# Patient Record
Sex: MALE | Race: WHITE | NOT HISPANIC OR LATINO | Employment: OTHER | URBAN - METROPOLITAN AREA
[De-identification: names, ages, dates, MRNs, and addresses within clinical notes are randomized per-mention and may not be internally consistent; named-entity substitution may affect disease eponyms.]

---

## 2017-08-16 ENCOUNTER — HISTORICAL (OUTPATIENT)
Dept: BARIATRICS | Facility: HOSPITAL | Age: 72
End: 2017-08-16

## 2017-09-12 ENCOUNTER — HISTORICAL (OUTPATIENT)
Dept: LAB | Facility: HOSPITAL | Age: 72
End: 2017-09-12

## 2017-09-12 ENCOUNTER — HISTORICAL (OUTPATIENT)
Dept: ADMINISTRATIVE | Facility: HOSPITAL | Age: 72
End: 2017-09-12

## 2017-09-12 LAB
ABS NEUT (OLG): 4.6 X10(3)/MCL (ref 2.1–9.2)
ALBUMIN SERPL-MCNC: 4.1 GM/DL (ref 3.4–5)
ALBUMIN/GLOB SERPL: 1.5 {RATIO}
ALP SERPL-CCNC: 67 UNIT/L (ref 50–136)
ALT SERPL-CCNC: 26 UNIT/L (ref 12–78)
APPEARANCE, UA: CLEAR
APTT PPP: 32.2 SECOND(S) (ref 20.6–36)
AST SERPL-CCNC: 15 UNIT/L (ref 15–37)
BACTERIA SPEC CULT: ABNORMAL /HPF
BASOPHILS # BLD AUTO: 0 X10(3)/MCL (ref 0–0.2)
BASOPHILS NFR BLD AUTO: 1 %
BILIRUB SERPL-MCNC: 0.5 MG/DL (ref 0.2–1)
BILIRUB UR QL STRIP: ABNORMAL
BILIRUBIN DIRECT+TOT PNL SERPL-MCNC: 0.2 MG/DL (ref 0–0.2)
BILIRUBIN DIRECT+TOT PNL SERPL-MCNC: 0.3 MG/DL (ref 0–0.8)
BUN SERPL-MCNC: 22 MG/DL (ref 7–18)
CALCIUM SERPL-MCNC: 8.9 MG/DL (ref 8.5–10.1)
CHLORIDE SERPL-SCNC: 109 MMOL/L (ref 98–107)
CO2 SERPL-SCNC: 28 MMOL/L (ref 21–32)
COLOR UR: YELLOW
CREAT SERPL-MCNC: 0.98 MG/DL (ref 0.7–1.3)
EOSINOPHIL # BLD AUTO: 0.1 X10(3)/MCL (ref 0–0.9)
EOSINOPHIL NFR BLD AUTO: 2 %
ERYTHROCYTE [DISTWIDTH] IN BLOOD BY AUTOMATED COUNT: 13.1 % (ref 11.5–17)
GLOBULIN SER-MCNC: 2.8 GM/DL (ref 2.4–3.5)
GLUCOSE (UA): NEGATIVE
GLUCOSE SERPL-MCNC: 127 MG/DL (ref 74–106)
HCT VFR BLD AUTO: 42 % (ref 42–52)
HGB BLD-MCNC: 14.3 GM/DL (ref 14–18)
HGB UR QL STRIP: NEGATIVE
INR PPP: 1.14 (ref 0–1.27)
KETONES UR QL STRIP: ABNORMAL
LEUKOCYTE ESTERASE UR QL STRIP: NEGATIVE
LYMPHOCYTES # BLD AUTO: 1.1 X10(3)/MCL (ref 0.6–4.6)
LYMPHOCYTES NFR BLD AUTO: 17 %
MCH RBC QN AUTO: 29.5 PG (ref 27–31)
MCHC RBC AUTO-ENTMCNC: 34 GM/DL (ref 33–36)
MCV RBC AUTO: 86.8 FL (ref 80–94)
MONOCYTES # BLD AUTO: 0.6 X10(3)/MCL (ref 0.1–1.3)
MONOCYTES NFR BLD AUTO: 9 %
NEUTROPHILS # BLD AUTO: 4.6 X10(3)/MCL (ref 2.1–9.2)
NEUTROPHILS NFR BLD AUTO: 71 %
NITRITE UR QL STRIP: NEGATIVE
PH UR STRIP: 5.5 [PH] (ref 5–9)
PLATELET # BLD AUTO: 166 X10(3)/MCL (ref 130–400)
PMV BLD AUTO: 9.8 FL (ref 9.4–12.4)
POTASSIUM SERPL-SCNC: 3.9 MMOL/L (ref 3.5–5.1)
PROT SERPL-MCNC: 6.9 GM/DL (ref 6.4–8.2)
PROT UR QL STRIP: NEGATIVE
PROTHROMBIN TIME: 14.4 SECOND(S) (ref 12.1–14.2)
RBC # BLD AUTO: 4.84 X10(6)/MCL (ref 4.7–6.1)
RBC #/AREA URNS HPF: ABNORMAL /[HPF]
SODIUM SERPL-SCNC: 145 MMOL/L (ref 136–145)
SP GR UR STRIP: 1.03 (ref 1–1.03)
SQUAMOUS EPITHELIAL, UA: ABNORMAL
TRANSFERRIN SERPL-MCNC: 268 MG/DL (ref 200–360)
UROBILINOGEN UR STRIP-ACNC: 1
WBC # SPEC AUTO: 6.4 X10(3)/MCL (ref 4.5–11.5)
WBC #/AREA URNS HPF: ABNORMAL /HPF

## 2017-09-14 LAB — FINAL CULTURE: NORMAL

## 2017-10-11 ENCOUNTER — HISTORICAL (OUTPATIENT)
Dept: ADMINISTRATIVE | Facility: HOSPITAL | Age: 72
End: 2017-10-11

## 2017-11-07 ENCOUNTER — HISTORICAL (OUTPATIENT)
Dept: LAB | Facility: HOSPITAL | Age: 72
End: 2017-11-07

## 2017-11-07 LAB — PSA SERPL-MCNC: 2.18 NG/ML (ref 0–4)

## 2018-02-27 ENCOUNTER — HISTORICAL (OUTPATIENT)
Dept: ADMINISTRATIVE | Facility: HOSPITAL | Age: 73
End: 2018-02-27

## 2020-11-03 ENCOUNTER — HISTORICAL (OUTPATIENT)
Dept: BARIATRICS | Facility: HOSPITAL | Age: 75
End: 2020-11-03

## 2022-01-12 ENCOUNTER — HISTORICAL (OUTPATIENT)
Dept: ADMINISTRATIVE | Facility: HOSPITAL | Age: 77
End: 2022-01-12

## 2022-01-12 LAB
ALBUMIN SERPL-MCNC: 3.9 GM/DL (ref 3.4–4.8)
ALBUMIN/GLOB SERPL: 1.3 RATIO (ref 1.1–2)
ALP SERPL-CCNC: 59 UNIT/L (ref 40–150)
ALT SERPL-CCNC: 13 UNIT/L (ref 0–55)
AST SERPL-CCNC: 16 UNIT/L (ref 5–34)
BILIRUB SERPL-MCNC: 0.8 MG/DL
BILIRUBIN DIRECT+TOT PNL SERPL-MCNC: 0.4 MG/DL (ref 0–0.5)
BILIRUBIN DIRECT+TOT PNL SERPL-MCNC: 0.4 MG/DL (ref 0–0.8)
BUN SERPL-MCNC: 26.8 MG/DL (ref 8.4–25.7)
CALCIUM SERPL-MCNC: 9.4 MG/DL (ref 8.7–10.5)
CHLORIDE SERPL-SCNC: 107 MMOL/L (ref 98–107)
CO2 SERPL-SCNC: 26 MMOL/L (ref 23–31)
CREAT SERPL-MCNC: 1.12 MG/DL (ref 0.73–1.18)
CREAT UR-MCNC: 82.1 MG/DL (ref 58–161)
EST. AVERAGE GLUCOSE BLD GHB EST-MCNC: 114 MG/DL
GLOBULIN SER-MCNC: 3 GM/DL (ref 2.4–3.5)
GLUCOSE SERPL-MCNC: 106 MG/DL (ref 82–115)
HBA1C MFR BLD: 5.6 %
MICROALBUMIN UR-MCNC: <5 UG/ML
MICROALBUMIN/CREAT RATIO PNL UR: <6.1 MG/GM CR (ref 0–30)
POTASSIUM SERPL-SCNC: 4 MMOL/L (ref 3.5–5.1)
PROT SERPL-MCNC: 6.9 GM/DL (ref 5.8–7.6)
SODIUM SERPL-SCNC: 142 MMOL/L (ref 136–145)

## 2022-01-13 ENCOUNTER — HISTORICAL (OUTPATIENT)
Dept: ADMINISTRATIVE | Facility: HOSPITAL | Age: 77
End: 2022-01-13

## 2022-03-24 ENCOUNTER — HISTORICAL (OUTPATIENT)
Dept: ADMINISTRATIVE | Facility: HOSPITAL | Age: 77
End: 2022-03-24

## 2022-03-24 LAB — URATE SERPL-MCNC: 8.2 MG/DL (ref 3.5–7.2)

## 2022-04-10 ENCOUNTER — HISTORICAL (OUTPATIENT)
Dept: ADMINISTRATIVE | Facility: HOSPITAL | Age: 77
End: 2022-04-10
Payer: MEDICARE

## 2022-04-26 VITALS
SYSTOLIC BLOOD PRESSURE: 118 MMHG | WEIGHT: 288.81 LBS | OXYGEN SATURATION: 96 % | HEIGHT: 70 IN | DIASTOLIC BLOOD PRESSURE: 60 MMHG | BODY MASS INDEX: 41.35 KG/M2

## 2022-05-03 NOTE — HISTORICAL [facility]
This is a historical note converted from El. Formatting and pictures may have been removed.  Please reference El for original formatting and attached multimedia. Chief Complaint  POST OP RIGHT TSR DONE ON 09/25/17. STILL FEELS A LITTLE SORE  History of Present Illness  Patient here today for follow-up from right TSR  Having less pain  No wound drainage  Tolerating PT well  Still with some stiffness and muscle weakness  Review of Systems  Constitutional: No fever, No chills.  Respiratory: No shortness of breath, No cough.  Cardiovascular: No chest pain.  Gastrointestinal: No nausea, No vomiting, No diarrhea, No constipation, No heartburn.  Genitourinary: No dysuria, No hematuria.  Hematology/Lymphatics: No bleeding tendency.  Endocrine: No polyuria.  Neurologic: Alert and oriented X4, No numbness, No tingling.  Psychiatric: No anxiety, No depression.  Physical Exam  Vitals & Measurements  BP:?143/55?  HT:?177.8?cm? HT:?177.8?cm? WT:?120.6?kg? WT:?120.6?kg? BMI:?38.15?  right?shoulder exam  Wound healing well without s/s infection  Decreased ROM and muscle weakness which is improving with physical therapy  CMS intact to the?right upper extremity  ???  ???  x-rays show intact?prosthesis  ???  staples removed in office today  Wound care instructions discussed with patient  ???  Plan:  Continue with therapy and home exercises  Follow-up in one month  Assessment/Plan  1.?Osteoarthritis of right shoulder  Ordered:  Clinic Follow up, *Est. 11/11/17 3:00:00 CST, Order for future visit, Osteoarthritis of right shoulder  Aftercare following joint replacement, [facility]  Post-Op follow-up visit [id number] PC, Osteoarthritis of right shoulder  Aftercare following joint replacement, [facility], 10/11/17 10:00:00 CDT  ?  2.?Aftercare following joint replacement  Ordered:  Clinic Follow up, *Est. 11/11/17 3:00:00 CST, Order for future visit, Osteoarthritis of right shoulder  Aftercare following joint  replacement, [facility]  Post-Op follow-up visit [id number] PC, Osteoarthritis of right shoulder  Aftercare following joint replacement, [facility], 10/11/17 10:00:00 CDT  ?   Problem List/Past Medical History  Ongoing  Able to lie down  Aftercare following joint replacement  Anxiety  Arthritis  CPAP (continuous positive airway pressure) ventilation weaning protocol  Depression  Exercise tolerance finding  Hyperlipidemia  Hypertension  Hypertension  Obesity  Osteoarthritis of right shoulder  Reflux  Reflux  Sleep apnea  Steroid  Varicose vein  Historical  Asthma  Colon polyp  Enlarged prostate  Gout  Kidney stone  Nasal bleeding  Wears glasses  Procedure/Surgical History  Replacement of Right Shoulder Joint with Synthetic Substitute, Open Approach (09/25/2017)  Total Shoulder Arthroplasty (Right) (09/25/2017)  Gastric Sleeve Laparoscopic (.) (08/24/2015)  Laparoscopic Vertical (Sleeve) Gastrectomy (08/24/2015)  Esophagogastroduodenoscopy (08/13/2015)  Esophagogastroduodenoscopy [EGD] with closed biopsy (08/13/2015)  Esophagogastroduodenoscopy, flexible, transoral; with biopsy, single or multiple (08/13/2015)  Appendectomy  Bilateral kidney stones  History of tonsillectomy  Medications  allopurinol 300 mg oral tablet, 300 mg= 1 tab(s), Oral, Daily  amlodipine-benazepril 5 mg-20 mg oral capsule, 1 cap(s), Oral, BID  atorvastatin 40 mg oral tablet, 40 mg= 1 tab(s), Oral, Daily  calcium carbonate 600 mg oral tablet, 600 mg= 1 tab(s), Oral, Daily  carvedilol 12.5 mg oral tablet, 12.5 mg= 1 tab(s), Oral, BID  Colace 100 mg oral capsule, 200 mg= 2 cap(s), Oral, Daily  Multi-Day Plus Minerals oral tablet, 1 tab(s), Oral, Daily  Percocet 5/325 oral tablet, 1-2 tab(s), Oral, q4hr, PRN  sertraline 100 mg oral tablet, 100 mg= 1 tab(s), Oral, Daily  Super B Complex oral tablet, 1 tab(s), Oral, Daily  tamsulosin 0.4 mg oral capsule, 0.4 mg= 1 cap(s), Oral, Daily  Valium 5 mg oral tablet, 5 mg= 1 tab(s), Oral,  q6hr, PRN  Allergies  No Known Medication Allergies  Social History  Alcohol - Low Risk, 09/12/2017  Current, Liquor, 1-2 times per month  Employment/School - 09/12/2017  Highest education level: High school.  Home/Environment - 09/12/2017  Lives with Spouse. Living situation: Home/Independent. Family/Friends available for support: Yes.  Nutrition/Health - 09/12/2017  Regular  Substance Abuse - Denies Substance Abuse, 09/12/2017  Tobacco - Denies Tobacco Use, 09/12/2017  DIP  Former smoker, Stopped age 68 Years.  Family History  Primary malignant neoplasm of brain: Mother.

## 2022-05-03 NOTE — HISTORICAL [facility]
This is a historical note converted from El. Formatting and pictures may have been removed.  Please reference El for original formatting and attached multimedia. Chief Complaint  PATIENT HERE FOR RIGHT SHOULDER PAIN. PATIENT STATES HE STARTED AT THE GYM IN 01/2018 WHEN THE SHOULDER STARTED HURTING. STATES IT IS STIFF AND SWOLLEN  History of Present Illness  Pain?started after therapy ?? Pain right Shoulder laterally ?? No fever ?? No chills  ?? Pain in the right shoulder in the subacromial region ?? In the supraspinatus region ?? When actively and passively moved ?? ?? Started gradually ?? Slowly worsens with extended activity ?? Worsens at night ?? Causing an inability to sleep ?? Causes difficulty lying on affected side ?? Feels better after rest ?? Not relieved by medication ?? Shoulder joint stiffness on the right ?? joint stiffness ???? No radicular arm pain ?? No right sternoclavicular joint pain ?? No right shoulder joint swelling ?? No pain in the acromioclavicular  ? ?? No tingling of the right shoulder ?? No right shoulder numbness  ?? Range of motion was abnormal difficulty reaching over head using right arm ?? Range of motion was abnormal difficulty combing hair using right arm ?? Range of motion was abnormal difficulty taking shirt on/off using right arm  ?? No neck pain on right Percervical pain ?? Not in the trapezius Trapezius pain  Pain is significantly effecting quality of life  Review of Systems  Review of Systems  ????Constitutional: No fever, No chills.  ????Respiratory: No shortness of breath, No cough.  ????Cardiovascular: No chest pain.  ????Gastrointestinal: No nausea, No vomiting, No diarrhea, No constipation, No heartburn.  ????Genitourinary: No dysuria, No hematuria.  ????Hematology/Lymphatics: No bleeding tendency.  ????Endocrine: No polyuria.  ????Neurologic: Alert and oriented X4, No numbness, No tingling.  ????Psychiatric: No anxiety, No depression.  Physical Exam  Vitals &  Measurements  BP:?150/90?  HT:?177?cm? HT:?177?cm? WT:?120.6?kg? WT:?120.6?kg? BMI:?38.49?  ??????  ???  PHYSICAL FINDING  ??  Neck:  ??NOTED ?Pain radiating to the shoulder Percervical  ???  Musculoskeletal:  ? NO swelling of the right acromioclavicular joint.  ? NO swelling of the left acromioclavicular joint.  ???  General Appearance:  ? In NO acute distress.  ???  Eyes:  General/bilateral:  Sclera: ? Normal.  ???  Ears:  General/bilateral:  Outer Ear: ? Normal.  ???  Lungs:  ? Respiratory excursion normal and symmetric.  ???  Cardiovascular:  Heart Rate and Rhythm: ? Normal.  Arterial Pulses: ? Ulnar pulses 1+ right. ? Radial pulse 1+ right. ? Radial pulse 1+ left.  ???  Abdomen:  ? Normal.  ???  ???  Musculoskeletal System:  ???  Shoulder:  ?.  ?? atrophy of the deltoid muscle  ? NO atrophy of the supraspinatus muscle  ? NO atrophy of the infraspinatus muscle  ? NO pain was elicited during a lift-off test of the shoulder?  ???  Right Shoulder: ?. ? Acromial tenderness on palpation. ? Tenderness on palpation of the subacromial bursa. ? Tenderness on palpation of the deltoid muscle. ? Tenderness on palpation of the supraspinatus muscle. ? Tenderness on palpation of the infraspinatus muscle. ? Tenderness on palpation of the glenohumeral joint region. ? Tenderness on palpation at the bicipital groove. ? Tenderness on palpation of the trapezius muscle. ? Subacromial crepitus on motion was noted.  ???  .  ???  Right Shoulder:  ???  Shoulder Motion:??????????????Value???? ????Normal Range  ???  Active abduction??????????????? 80 degrees  Active forward flexion????????????????160 degrees  Active external rotation?????????????? 40 degrees  Active external rotation?????????????? 30 degrees  ???  ? NO swelling medial sternoclavicular.  ? NO swelling.  ? NO induration.  ? NO erythema.  ?? long head biceps deformity.  ? NO winged scapula.  ?  ? NO pain was elicited during a Neer impingement test.  ? NO pain was elicited  during a Boston-Anmol impingement test.  ?   ?????????????????????????????????????????????????????????????????????????  Left Shoulder:  ???  ??????Normal Range  ???  ?   ???????????????????????????????????????????????????????????????????????????????  Clavicle:  ???  General/bilateral: ? Acromioclavicular joint showed NO pain elicited by motion distal right.  ???  Right Clavicle: ? Right sternoclavicular joint showed tenderness on palpation. ? Right acromioclavicular joint showed tenderness on palpation.  ???  Left Clavicle: ? Left sternoclavicular joint showed tenderness on palpation. ? Left acromioclavicular joint showed tenderness on palpation.  ???  ???  Neurological:  ???  ? NO abnormalities were noted Sensibility intact distally on right.  ? Oriented to time, place, and person.  ???  Sensation:  ? NO sensory exam abnormalities were noted Decreased median sensation on right.  ? NO sensory exam abnormalities were noted Decreased ulnar sensation on right.  ? NO sensory exam abnormalities were noted Decreased radial sensation on right.  ???  Motor (Strength):  ? NO weakness of the right shoulder was observed.  ? NO weakness of the left shoulder was observed.  ??????????????????????????????????????????????????????????????????????????????  Skin:  ? NO ecchymosis on the shoulders left.  ? NO ecchymosis on the shoulders right.  ???  Injury / Incision Site: ? NO scar.  ???  TESTS  ???  Imaging:  ???  X-Ray Shoulder:?Complete, two or more views of the true AP of the glenohumeral joint were performed??????????????????????????????????????????????????????????????????????????????????  ?  ?   radiographic evidence of a glenohumeral joint replacement with good positioning of the humerus and glenoid component.  .  ???  :  ??????  ???  ASSESSMENT  biceps tendon rupture  ? Partial tear of rotator cuff tendon  ? Adhesive capsulitis of right shoulder  ???  ???  PLAN  ?   ? Physical therapy service  ? Home  exercises  Assessment/Plan  1.?Strain of muscle, fascia and tendon of long head of biceps, right arm, initial encounter  Right shoulder pain   Problem List/Past Medical History  Ongoing  Able to lie down  Aftercare following joint replacement  Anxiety  Arthritis  Biceps tendon tear  CPAP (continuous positive airway pressure) ventilation weaning protocol  Depression  Exercise tolerance finding  Hyperlipidemia  Hypertension  Hypertension  Obesity  Osteoarthritis of right shoulder  Reflux  Reflux  Sleep apnea  Steroid  Varicose vein  Historical  Asthma  Colon polyp  Enlarged prostate  Gout  Kidney stone  Nasal bleeding  Wears glasses  Procedure/Surgical History  Replacement of Right Shoulder Joint with Synthetic Substitute, Open Approach (09/25/2017), Total Shoulder Arthroplasty (Right) (09/25/2017), Gastric Sleeve Laparoscopic (.) (08/24/2015), Laparoscopic Vertical (Sleeve) Gastrectomy (08/24/2015), Esophagogastroduodenoscopy (08/13/2015), Esophagogastroduodenoscopy [EGD] with closed biopsy (08/13/2015), Esophagogastroduodenoscopy, flexible, transoral; with biopsy, single or multiple (08/13/2015), Appendectomy, Bilateral kidney stones, History of tonsillectomy.  Medications  allopurinol 300 mg oral tablet, 300 mg= 1 tab(s), Oral, Daily  amlodipine-benazepril 5 mg-20 mg oral capsule, 1 cap(s), Oral, BID  atorvastatin 40 mg oral tablet, 40 mg= 1 tab(s), Oral, Daily  calcium carbonate 600 mg oral tablet, 600 mg= 1 tab(s), Oral, Daily  carvedilol 12.5 mg oral tablet, 12.5 mg= 1 tab(s), Oral, BID  Colace 100 mg oral capsule, 200 mg= 2 cap(s), Oral, Daily,? ?Not taking  MECLIZINE 25 MG TABLET  Multi-Day Plus Minerals oral tablet, 1 tab(s), Oral, Daily  Percocet 5/325 oral tablet, 1-2 tab(s), Oral, q4hr, PRN,? ?Not taking  sertraline 100 mg oral tablet, 100 mg= 1 tab(s), Oral, Daily  Super B Complex oral tablet, 1 tab(s), Oral, Daily  tamsulosin 0.4 mg oral capsule, 0.4 mg= 1 cap(s), Oral, Daily  Valium 5 mg oral tablet, 5  mg= 1 tab(s), Oral, q6hr, PRN,? ?Not taking  Allergies  No Known Medication Allergies  Social History  Alcohol - Low Risk, 06/03/2015  Current, Liquor, 1-2 times per month, 09/12/2017  Employment/School  Highest education level: High school., 08/09/2015  Home/Environment  Lives with Spouse. Living situation: Home/Independent. Family/Friends available for support: Yes., 08/09/2015  Nutrition/Health  Regular, 08/09/2015  Substance Abuse - Denies Substance Abuse, 08/09/2015  Never, 02/27/2018  Tobacco - Denies Tobacco Use, 06/03/2015  DIP, 08/09/2015  Former smoker, Stopped age 68 Years., 06/03/2015  Family History  Primary malignant neoplasm of brain: Mother.  Immunizations  Vaccine Date Status   pneumococcal 23-polyvalent vaccine 08/25/2015 Given

## 2022-05-03 NOTE — HISTORICAL [facility]
This is a historical note converted from El. Formatting and pictures may have been removed.  Please reference El for original formatting and attached multimedia. Chief Complaint  PATIENT HER FOR RIGHT SHOULDER PAIN. DID XRAYS WITH PCP. WILL GET NEW ONES TODAY  History of Present Illness  Pain getting WORST?? Pain right Shoulder laterally ?? No fever ?? No chills  ?? Pain in the right shoulder in the subacromial region ?? In the supraspinatus region ?? When actively and passively moved ?? ?? Started gradually ?? Slowly worsens with extended activity ?? Worsens at night ?? Causing an inability to sleep ?? Causes difficulty lying on affected side ?? Feels better after rest ?? Not relieved by medication ?? Shoulder joint stiffness on the right ?? joint stiffness ???? No radicular arm pain ?? No right sternoclavicular joint pain ?? No right shoulder joint swelling ?? No pain in the acromioclavicular  ? ?? No tingling of the right shoulder ?? No right shoulder numbness  ?? Range of motion was abnormal difficulty reaching over head using right arm ?? Range of motion was abnormal difficulty combing hair using right arm ?? Range of motion was abnormal difficulty taking shirt on/off using right arm  ?? No neck pain on right Percervical pain ?? Not in the trapezius Trapezius pain  Pain is significantly effecting quality of life  Review of Systems  Review of Systems  ????Constitutional: No fever, No chills.  ????Respiratory: No shortness of breath, No cough.  ????Cardiovascular: No chest pain.  ????Gastrointestinal: No nausea, No vomiting, No diarrhea, No constipation, No heartburn.  ????Genitourinary: No dysuria, No hematuria.  ????Hematology/Lymphatics: No bleeding tendency.  ????Endocrine: No polyuria.  ????Neurologic: Alert and oriented X4, No numbness, No tingling.  ????Psychiatric: No anxiety, No depression.  Physical Exam  Vitals & Measurements  BP:?140/88?  HT:?175?cm? HT:?175?cm? WT:?113.39?kg? WT:?113.39?kg?  BMI:?37.03?  ??????  ???  PHYSICAL FINDING  ??  Neck:  ??NOTED ?Pain radiating to the shoulder Percervical  ???  Musculoskeletal:  ? NO swelling of the right acromioclavicular joint.  ? NO swelling of the left acromioclavicular joint.  ???  General Appearance:  ? In NO acute distress.  ???  Eyes:  General/bilateral:  Sclera: ? Normal.  ???  Ears:  General/bilateral:  Outer Ear: ? Normal.  ???  Lungs:  ? Respiratory excursion normal and symmetric.  ???  Cardiovascular:  Heart Rate and Rhythm: ? Normal.  Arterial Pulses: ? Ulnar pulses 1+ right. ? Radial pulse 1+ right. ? Radial pulse 1+ left.  ???  Abdomen:  ? Normal.  ???  ???  Musculoskeletal System:  ???  Shoulder:  ?.  ?? atrophy of the deltoid muscle  ?? atrophy of the supraspinatus muscle  ? NO atrophy of the infraspinatus muscle  ? NO pain was elicited during a lift-off test of the shoulder?  ???  Right Shoulder: ?. ? Acromial tenderness on palpation. ? Tenderness on palpation of the subacromial bursa. ? Tenderness on palpation of the deltoid muscle. ? Tenderness on palpation of the supraspinatus muscle. ? Tenderness on palpation of the infraspinatus muscle. ? Tenderness on palpation of the glenohumeral joint region. ? Tenderness on palpation at the bicipital groove. ? Tenderness on palpation of the trapezius muscle. ? Subacromial crepitus on motion was noted.  ???  ?   Right Shoulder:  ???  Shoulder Motion:??????????????Value???? ????Normal Range  ???  Active abduction????????????????45 degrees  Active forward flexion????????????????100 degrees  Active external rotation?????????????? 05 degrees  Active internal rotation?????????????? 20 degrees  ???  ? NO swelling medial sternoclavicular.  ? NO swelling.  ? NO induration.  ? NO erythema.  ? NO long head biceps deformity.  ? NO winged scapula.  ?   ? Motion was normal.  ??????????????????????????????????????????????????????????????  Left Shoulder:  ???  ??Normal  Range  ???  ???????????????  Clavicle:  ???  General/bilateral: ? Acromioclavicular joint showed NO pain elicited by motion distal right.  ???  Right Clavicle: ? Right sternoclavicular joint showed tenderness on palpation. ? Right acromioclavicular joint showed tenderness on palpation.  ???  Left Clavicle: ? Left sternoclavicular joint showed tenderness on palpation. ? Left acromioclavicular joint showed tenderness on palpation.  ???  ???  Neurological:  ???  ? NO abnormalities were noted Sensibility intact distally on right.  ? Oriented to time, place, and person.  ???  Sensation:  ? NO sensory exam abnormalities were noted Decreased median sensation on right.  ? NO sensory exam abnormalities were noted Decreased ulnar sensation on right.  ? NO sensory exam abnormalities were noted Decreased radial sensation on right.  ???  Motor (Strength):  ? weakness of the right shoulder was observed.  ? NO weakness of the left shoulder was observed.  ??????????????????????????????????????????????????????????????????????????????  Skin:  ? NO ecchymosis on the shoulders left.  ? NO ecchymosis on the shoulders right.  ???  Injury / Incision Site: ? NO scar.  ???  TESTS  ???  Imaging:  ???  X-Ray Shoulder:?Complete, two or more views of the true AP of the glenohumeral joint were performed??????????????????????????????????????????????????????????????????????????????????  ?  ?   radiographic evidence of any osteoarticular abnormality  This patients right shoulder x-rays show severe osteoarthritis with bone-on-bone of the humeral head and glenoid?with large osteophyte formation.????????????????????????????????????????????????????????????????????????????????????????????????????????????????????????????????????????????????????????????  .  ???  MRI Shoulder:  ??????  ???  ASSESSMENT  ? Localized primary osteoarthritis of the right shoulder glenohumeral joint  ?   ???  ???  PLAN  ?   ? RIGHT Shoulder arthroplasty  ?   ? Physical therapy  service  ? Home exercises  Assessment/Plan  Right shoulder pain  Ordered:  XR Shoulder Right Minimum 2 Views  ?   Problem List/Past Medical History  Ongoing  Able to lie down  Anxiety  Arthritis  CPAP (continuous positive airway pressure) ventilation weaning protocol  Depression  Exercise tolerance finding  Hyperlipidemia  Hypertension  Hypertension  Obesity  Reflux  Reflux  Sleep apnea  Steroid  Historical  Asthma  Colon polyp  Enlarged prostate  Gout  Kidney stone  Nasal bleeding  Wears glasses  Procedure/Surgical History  Gastric Sleeve Laparoscopic (.) (08/24/2015)  Laparoscopic Vertical (Sleeve) Gastrectomy (08/24/2015)  Esophagogastroduodenoscopy (08/13/2015)  Esophagogastroduodenoscopy [EGD] with closed biopsy (08/13/2015)  Esophagogastroduodenoscopy, flexible, transoral; with biopsy, single or multiple (08/13/2015)  Appendectomy  Bilateral kidney stones  History of tonsillectomy  Medications  allopurinol 300 mg oral tablet, 300 mg= 1 tab(s), Oral, Daily  amlodipine 10 mg oral tablet, 10 mg= 1 tab(s), Oral, Daily,? ?Not taking  AMLODIPINE-BENAZEPRIL 5-20 MG  ATORVASTATIN 40 MG TABLET  benazepril 40 mg oral tablet, 40 mg= 1 tab(s), Oral, Daily,? ?Not taking  CARVEDILOL 12.5 MG TABLET  Fish Oil, 1 tab(s), Oral, Daily,? ?Not taking  lysine 500 mg oral tablet, 1 tab, Oral, Daily  Multi-Day Plus Minerals oral tablet, 1 tab(s), Oral, Daily  sertraline 100 mg oral tablet, 100 mg= 1 tab(s), Oral, Daily  Super B Complex oral tablet, 1 tab(s), Oral, Daily  tamsulosin 0.4 mg oral capsule, 0.4 mg= 1 cap(s), Oral, Daily  Vitamin D3 1000 intl units oral tablet, 1000 IntUnit= 1 tab(s), Oral, Daily,? ?Not taking  Allergies  No Known Medication Allergies  Social History  Alcohol - Low Risk, 08/09/2015  Current, Beer, 1-2 times per week  Employment/School - 08/09/2015  Highest education level: High school.  Home/Environment - 08/09/2015  Lives with Spouse. Living situation: Home/Independent. Family/Friends available for  support: Yes.  Nutrition/Health - 08/09/2015  Regular  Substance Abuse - Denies Substance Abuse, 08/09/2015  Tobacco - Denies Tobacco Use, 08/09/2015  DIP  Former smoker, Stopped age 68 Years.  Family History  Primary malignant neoplasm of brain: Mother.

## 2022-05-31 ENCOUNTER — OFFICE VISIT (OUTPATIENT)
Dept: ORTHOPEDICS | Facility: CLINIC | Age: 77
End: 2022-05-31
Payer: MEDICARE

## 2022-05-31 VITALS — HEIGHT: 70 IN | BODY MASS INDEX: 41.23 KG/M2 | WEIGHT: 288 LBS

## 2022-05-31 DIAGNOSIS — M17.12 PRIMARY OSTEOARTHRITIS OF LEFT KNEE: Primary | ICD-10-CM

## 2022-05-31 DIAGNOSIS — M17.11 PRIMARY OSTEOARTHRITIS OF RIGHT KNEE: ICD-10-CM

## 2022-05-31 PROCEDURE — 99499 NO LOS: ICD-10-PCS | Mod: ,,, | Performed by: PHYSICIAN ASSISTANT

## 2022-05-31 PROCEDURE — 20610 DRAIN/INJ JOINT/BURSA W/O US: CPT | Mod: RT,,, | Performed by: PHYSICIAN ASSISTANT

## 2022-05-31 PROCEDURE — 20610 LARGE JOINT ASPIRATION/INJECTION: R KNEE: ICD-10-PCS | Mod: RT,,, | Performed by: PHYSICIAN ASSISTANT

## 2022-05-31 PROCEDURE — 99499 UNLISTED E&M SERVICE: CPT | Mod: ,,, | Performed by: PHYSICIAN ASSISTANT

## 2022-05-31 RX ORDER — ATORVASTATIN CALCIUM 40 MG/1
40 TABLET, FILM COATED ORAL DAILY
COMMUNITY

## 2022-05-31 RX ORDER — LIDOCAINE HYDROCHLORIDE 20 MG/ML
2 INJECTION, SOLUTION INFILTRATION; PERINEURAL
Status: DISCONTINUED | OUTPATIENT
Start: 2022-05-31 | End: 2022-05-31 | Stop reason: HOSPADM

## 2022-05-31 RX ORDER — CARVEDILOL 12.5 MG/1
12.5 TABLET ORAL 2 TIMES DAILY WITH MEALS
COMMUNITY

## 2022-05-31 RX ORDER — TRIAMTERENE AND HYDROCHLOROTHIAZIDE 37.5; 25 MG/1; MG/1
1 CAPSULE ORAL EVERY MORNING
Status: ON HOLD | COMMUNITY
End: 2022-10-10 | Stop reason: CLARIF

## 2022-05-31 RX ORDER — MECLIZINE HYDROCHLORIDE 25 MG/1
25 TABLET ORAL DAILY
COMMUNITY
Start: 2022-01-04

## 2022-05-31 RX ORDER — OMEGA-3-ACID ETHYL ESTERS 1 G/1
2 CAPSULE, LIQUID FILLED ORAL 2 TIMES DAILY
COMMUNITY

## 2022-05-31 RX ORDER — AMLODIPINE BESYLATE AND ATORVASTATIN CALCIUM 5; 20 MG/1; MG/1
1 TABLET, FILM COATED ORAL DAILY
Status: ON HOLD | COMMUNITY
End: 2022-10-10 | Stop reason: CLARIF

## 2022-05-31 RX ORDER — TIOTROPIUM BROMIDE 18 UG/1
18 CAPSULE ORAL; RESPIRATORY (INHALATION) DAILY
COMMUNITY
Start: 2022-01-31

## 2022-05-31 RX ORDER — SERTRALINE HYDROCHLORIDE 100 MG/1
100 TABLET, FILM COATED ORAL DAILY
COMMUNITY

## 2022-05-31 RX ADMIN — LIDOCAINE HYDROCHLORIDE 2 ML: 20 INJECTION, SOLUTION INFILTRATION; PERINEURAL at 10:05

## 2022-05-31 NOTE — PROGRESS NOTES
"Chief Complaint:   Chief Complaint   Patient presents with    Injections     f/u R knee pain, starting  synvics series inj, pt states pain has been bad, said it lasted for awhile and then got worst again,        History of present illness:    This is a 76 y.o. year old male who complains of bilateral kne pain  Starting right knee Synvisc series today    States his left knee is now very painful and would like to get approval for Synvisc for that knee as well    Review of Systems:    Constitution:   Denies chills, fever, and sweats.  HENT:   Denies headaches or blurry vision.  Cardiovascular:  Denies chest pain or irregular heart beat.  Respiratory:   Denies cough or shortness of breath.  Gastrointestinal:  Denies abdominal pain, nausea, or vomiting.  Musculoskeletal:   Denies muscle cramps.  Neurological:   Denies dizziness or focal weakness.  Psychiatric/Behavior: Normal mental status.  Hematology/Lymph:  Denies bleeding problem or easy bruising/bleeding.  Skin:    Denies rash or suspicious lesions.    Examination:    Vital Signs:    Vitals:    05/31/22 1100   Weight: 130.6 kg (288 lb)   Height: 5' 10" (1.778 m)       Body mass index is 41.32 kg/m².    Constitution:   Well-developed, well nourished patient in no acute distress.  Neurological:   Alert and oriented x 3 and cooperative to examination.     Psychiatric/Behavior: Normal mental status.  Respiratory:   No shortness of breath.  Eyes:    Extraoccular muscles intact  Skin:    No scars, rash or suspicious lesions.    Physical Exam:       General Musculoskeletal Exam   Gait: antalgic       bilateral Knee Exam     Inspection   Erythema: absent  Effusion: present  Deformity: present  Bruising: absent    Tenderness   The patient is tender to palpation of the medial joint line    Crepitus   The patient has crepitus with ROM    Range of Motion   Extension: abnormal   Flexion: abnormal   5-120    Tests   Ligament Examination   MCL - Valgus: normal (0 to 2mm)  LCL - " Varus: normal  Patella   Passive Patellar Tilt: neutral    Other   Sensation: normal    Comments:  varus deformity    Muscle Strength   Right Lower Extremity   Quadriceps:  5/5   Hamstrin/5     Vascular Exam     Right Pulses  Dorsalis Pedis:      2+  Posterior Tibial:      2+    X-rays taken and reviewed from previous appt today of the bilateral knees       Assessment: There are no diagnoses linked to this encounter.     Plan:  Right knee Synvisc injection given today    We will get authorization to start Synvisc on the patient's left knee as his pain has gotten increasingly worse          DISCLAIMER: This note may have been dictated using voice recognition software and may contain grammatical errors.     NOTE: Consult report sent to referring provider via [email or web address] EMR.

## 2022-05-31 NOTE — PROCEDURES
Large Joint Aspiration/Injection: R knee    Date/Time: 5/31/2022 10:15 AM  Performed by: Levon Cortes PA-C  Authorized by: Levon Cortes PA-C     Consent Done?:  Yes (Verbal)  Indications:  Pain  Site marked: the procedure site was marked    Timeout: prior to procedure the correct patient, procedure, and site was verified    Prep: patient was prepped and draped in usual sterile fashion      Local anesthesia used?: Yes    Local anesthetic:  Topical anesthetic and lidocaine 2% without epinephrine  Ultrasonic Guidance for needle placement?: No    Approach:  Superior  Location:  Knee  Site:  R knee  Medications:  2 mL LIDOcaine HCL 20 mg/ml (2%) 20 mg/mL (2 %); 48 mg hylan g-f 20 48 mg/6 mL  Patient tolerance:  Patient tolerated the procedure well with no immediate complications

## 2022-05-31 NOTE — PROCEDURES
Large Joint Aspiration/Injection: L knee    Date/Time: 5/31/2022 10:15 AM  Performed by: Levon Cortes PA-C  Authorized by: Levon Cortes PA-C     Consent Done?:  Yes (Verbal)  Indications:  Pain  Site marked: the procedure site was marked    Timeout: prior to procedure the correct patient, procedure, and site was verified    Prep: patient was prepped and draped in usual sterile fashion      Local anesthesia used?: Yes    Local anesthetic:  Topical anesthetic and lidocaine 2% without epinephrine  Ultrasonic Guidance for needle placement?: No    Approach:  Superior  Location:  Knee  Site:  L knee  Medications:  2 mL LIDOcaine HCL 20 mg/ml (2%) 20 mg/mL (2 %); 48 mg hylan g-f 20 48 mg/6 mL  Patient tolerance:  Patient tolerated the procedure well with no immediate complications

## 2022-06-08 ENCOUNTER — OFFICE VISIT (OUTPATIENT)
Dept: ORTHOPEDICS | Facility: CLINIC | Age: 77
End: 2022-06-08
Payer: MEDICARE

## 2022-06-08 DIAGNOSIS — M17.11 PRIMARY OSTEOARTHRITIS OF RIGHT KNEE: Primary | ICD-10-CM

## 2022-06-08 PROCEDURE — 20610 LARGE JOINT ASPIRATION/INJECTION: R KNEE: ICD-10-PCS | Mod: RT,,, | Performed by: PHYSICIAN ASSISTANT

## 2022-06-08 PROCEDURE — 99499 NO LOS: ICD-10-PCS | Mod: ,,, | Performed by: PHYSICIAN ASSISTANT

## 2022-06-08 PROCEDURE — 20610 DRAIN/INJ JOINT/BURSA W/O US: CPT | Mod: RT,,, | Performed by: PHYSICIAN ASSISTANT

## 2022-06-08 PROCEDURE — 99499 UNLISTED E&M SERVICE: CPT | Mod: ,,, | Performed by: PHYSICIAN ASSISTANT

## 2022-06-08 RX ORDER — LIDOCAINE HYDROCHLORIDE 20 MG/ML
2 INJECTION, SOLUTION INFILTRATION; PERINEURAL
Status: DISCONTINUED | OUTPATIENT
Start: 2022-06-08 | End: 2022-06-08 | Stop reason: HOSPADM

## 2022-06-08 RX ADMIN — LIDOCAINE HYDROCHLORIDE 2 ML: 20 INJECTION, SOLUTION INFILTRATION; PERINEURAL at 02:06

## 2022-06-08 NOTE — PROGRESS NOTES
Patient presents today for second visco-supplementation injection of the right knee      After verbal consent was obtained, the patient's knee was prepped and sterilely injected with Visco-supplementation.  Band-aid placed.  Patient did well following injection.

## 2022-06-08 NOTE — PROCEDURES
Large Joint Aspiration/Injection: R knee    Date/Time: 6/8/2022 2:00 PM  Performed by: Levon Cortes PA-C  Authorized by: Levon Cortes PA-C     Consent Done?:  Yes (Verbal)  Indications:  Pain  Site marked: the procedure site was marked    Timeout: prior to procedure the correct patient, procedure, and site was verified    Prep: patient was prepped and draped in usual sterile fashion      Local anesthesia used?: Yes    Local anesthetic:  Topical anesthetic and lidocaine 2% without epinephrine  Ultrasonic Guidance for needle placement?: No    Approach:  Superior  Location:  Knee  Site:  R knee  Medications:  2 mL LIDOcaine HCL 20 mg/ml (2%) 20 mg/mL (2 %); 48 mg hylan g-f 20 48 mg/6 mL  Patient tolerance:  Patient tolerated the procedure well with no immediate complications

## 2022-06-15 ENCOUNTER — OFFICE VISIT (OUTPATIENT)
Dept: ORTHOPEDICS | Facility: CLINIC | Age: 77
End: 2022-06-15
Payer: MEDICARE

## 2022-06-15 VITALS — BODY MASS INDEX: 41.23 KG/M2 | WEIGHT: 288 LBS | HEIGHT: 70 IN

## 2022-06-15 DIAGNOSIS — M17.11 PRIMARY OSTEOARTHRITIS OF RIGHT KNEE: Primary | ICD-10-CM

## 2022-06-15 DIAGNOSIS — M17.12 PRIMARY OSTEOARTHRITIS OF LEFT KNEE: ICD-10-CM

## 2022-06-15 PROCEDURE — 20610 DRAIN/INJ JOINT/BURSA W/O US: CPT | Mod: RT,,, | Performed by: PHYSICIAN ASSISTANT

## 2022-06-15 PROCEDURE — 99499 NO LOS: ICD-10-PCS | Mod: ,,, | Performed by: PHYSICIAN ASSISTANT

## 2022-06-15 PROCEDURE — 20610 LARGE JOINT ASPIRATION/INJECTION: R KNEE: ICD-10-PCS | Mod: RT,,, | Performed by: PHYSICIAN ASSISTANT

## 2022-06-15 PROCEDURE — 99499 UNLISTED E&M SERVICE: CPT | Mod: ,,, | Performed by: PHYSICIAN ASSISTANT

## 2022-06-15 RX ORDER — LIDOCAINE HYDROCHLORIDE 20 MG/ML
2 INJECTION, SOLUTION INFILTRATION; PERINEURAL
Status: DISCONTINUED | OUTPATIENT
Start: 2022-06-15 | End: 2022-06-15 | Stop reason: HOSPADM

## 2022-06-15 RX ADMIN — LIDOCAINE HYDROCHLORIDE 2 ML: 20 INJECTION, SOLUTION INFILTRATION; PERINEURAL at 01:06

## 2022-06-15 NOTE — PROCEDURES
Large Joint Aspiration/Injection: R knee    Date/Time: 6/15/2022 1:45 PM  Performed by: Levon Cortes PA-C  Authorized by: Levon Cortes PA-C     Consent Done?:  Yes (Verbal)  Indications:  Pain  Site marked: the procedure site was marked    Timeout: prior to procedure the correct patient, procedure, and site was verified    Prep: patient was prepped and draped in usual sterile fashion      Local anesthesia used?: Yes    Local anesthetic:  Topical anesthetic and lidocaine 2% without epinephrine  Ultrasonic Guidance for needle placement?: No    Approach:  Superior  Location:  Knee  Site:  R knee  Medications:  2 mL LIDOcaine HCL 20 mg/ml (2%) 20 mg/mL (2 %); 48 mg hylan g-f 20 48 mg/6 mL  Patient tolerance:  Patient tolerated the procedure well with no immediate complications

## 2022-06-15 NOTE — PROGRESS NOTES
Patient presents today for 3rd visco-supplementation injection of the right knee      After verbal consent was obtained, the patient's knee was prepped and sterilely injected with Visco-supplementation.  Band-aid placed.  Patient did well following injection.      Will f/u in 4-5 weeks if pain persists    His left knee is not painful at this time so we will hold off on staritng left knee Synvisc

## 2022-07-27 ENCOUNTER — OFFICE VISIT (OUTPATIENT)
Dept: ORTHOPEDICS | Facility: CLINIC | Age: 77
End: 2022-07-27
Payer: MEDICARE

## 2022-07-27 VITALS — BODY MASS INDEX: 41.23 KG/M2 | WEIGHT: 288 LBS | HEIGHT: 70 IN

## 2022-07-27 DIAGNOSIS — M17.12 PRIMARY LOCALIZED OSTEOARTHROSIS OF THE KNEE, LEFT: ICD-10-CM

## 2022-07-27 DIAGNOSIS — M17.11 PRIMARY LOCALIZED OSTEOARTHROSIS, LOWER LEG, RIGHT: Primary | ICD-10-CM

## 2022-07-27 PROCEDURE — 99204 PR OFFICE/OUTPT VISIT, NEW, LEVL IV, 45-59 MIN: ICD-10-PCS | Mod: 25,,, | Performed by: SPECIALIST

## 2022-07-27 PROCEDURE — 20610 DRAIN/INJ JOINT/BURSA W/O US: CPT | Mod: 50,,, | Performed by: SPECIALIST

## 2022-07-27 PROCEDURE — 99204 OFFICE O/P NEW MOD 45 MIN: CPT | Mod: 25,,, | Performed by: SPECIALIST

## 2022-07-27 PROCEDURE — 20610 LARGE JOINT ASPIRATION/INJECTION: L KNEE: ICD-10-PCS | Mod: 50,,, | Performed by: SPECIALIST

## 2022-07-27 RX ORDER — LIDOCAINE HYDROCHLORIDE 20 MG/ML
2 INJECTION, SOLUTION INFILTRATION; PERINEURAL
Status: DISCONTINUED | OUTPATIENT
Start: 2022-07-27 | End: 2022-07-27 | Stop reason: HOSPADM

## 2022-07-27 RX ADMIN — LIDOCAINE HYDROCHLORIDE 2 ML: 20 INJECTION, SOLUTION INFILTRATION; PERINEURAL at 09:07

## 2022-07-27 NOTE — PROCEDURES
Large Joint Aspiration/Injection: L knee    Date/Time: 7/27/2022 9:15 AM  Performed by: Chi Ceja MD  Authorized by: Chi Ceja MD     Consent Done?:  Yes (Verbal)  Indications:  Pain  Site marked: the procedure site was marked    Timeout: prior to procedure the correct patient, procedure, and site was verified    Prep: patient was prepped and draped in usual sterile fashion      Local anesthesia used?: Yes    Local anesthetic:  Topical anesthetic and lidocaine 2% without epinephrine  Ultrasonic Guidance for needle placement?: No    Approach:  Superior (superolateral)  Location:  Knee  Site:  L knee  Medications:  48 mg hylan g-f 20 48 mg/6 mL; 2 mL LIDOcaine HCL 20 mg/ml (2%) 20 mg/mL (2 %)  Patient tolerance:  Patient tolerated the procedure well with no immediate complications

## 2022-07-27 NOTE — PROGRESS NOTES
Chief Complaint:   Chief Complaint   Patient presents with    Follow-up     RIGHT KNEE LAST SYNVISC SERIES 06/15/22 STATES ITS STILL GIVING HIM ON/OFF PAIN IT STILL WILL GIVE OUT ON HIM          History of present illness:    This is a 76 y.o. year old male who complains of right knee pain.  The patient has had persistent discomfort and swelling he just completed a Synvisc injection a month ago.Knee symptoms ::knee gives way  Knee joint pain on the left  Worse with weightbearing  Worse in the morning   Slowly worsens with extended activity  Is increased by bending it  By twisting it  By kneeling  With stairs  By squatting   Knee joint swelling on the left posteriorly  Medially  Laterally °  Knee joint pain not improved by rest ° Not by ice °  clicking sensation in the left knee ° grating sensation in the left knee   ° The knee did  suddenly buckle due to contact ° No popping sound was heard in the knee   ° No tingling ° No burning sensation    Going to get a set of series of Synvisc on his left knee    Past Medical History:   Diagnosis Date    COPD (chronic obstructive pulmonary disease)     Elevated cholesterol     Hypertension        Past Surgical History:   Procedure Laterality Date    gastric sleeve      kidney stones      TOTAL SHOULDER ARTHROPLASTY         Current Outpatient Medications   Medication Instructions    amlodipine-atorvastatin (CADUET) 5-20 mg per tablet 1 tablet, Oral, Daily    atorvastatin (LIPITOR) 40 mg, Oral, Daily    carvediloL (COREG) 12.5 mg, Oral, 2 times daily with meals    meclizine (ANTIVERT) 25 mg, Oral    omega-3 acid ethyl esters (LOVAZA) 2 g, Oral, 2 times daily    sertraline (ZOLOFT) 100 mg, Oral, Daily    SPIRIVA WITH HANDIHALER 18 mcg, Inhalation    triamterene-hydrochlorothiazide 37.5-25 mg (DYAZIDE) 37.5-25 mg per capsule 1 capsule, Oral, Every morning    vitamin E 100 Units, Oral, Daily        Review of patient's allergies indicates:   Allergen  "Reactions    Sulfa (sulfonamide antibiotics)        History reviewed. No pertinent family history.        Review of Systems:    Constitution:   Denies chills, fever, and sweats.  HENT:   Denies headaches or blurry vision.  Cardiovascular:  Denies chest pain or irregular heart beat.  Respiratory:   Denies cough or shortness of breath.  Gastrointestinal:  Denies abdominal pain, nausea, or vomiting.  Musculoskeletal:   Denies muscle cramps.  Neurological:   Denies dizziness or focal weakness.  Psychiatric/Behavior: Normal mental status.  Hematology/Lymph:  Denies bleeding problem or easy bruising/bleeding.  Skin:    Denies rash or suspicious lesions.    Examination:    Vital Signs:    Vitals:    07/27/22 0946   Weight: 130.6 kg (288 lb)   Height: 5' 10" (1.778 m)       Body mass index is 41.32 kg/m².    Constitution:   Well-developed, well nourished patient in no acute distress.  Neurological:   Alert and oriented x 3 and cooperative to examination.     Psychiatric/Behavior: Normal mental status.  Respiratory:   No shortness of breath.  Eyes:    Extraoccular muscles intact  Skin:    No scars, rash or suspicious lesions.    Musculoskeletal Exam :   PHYSICAL FINDINGS  Cardiovascular:  Arterial Pulses: ° Dorsalis pedis pulses were normal right. ° Dorsalis pedis pulses were normal left.  Musculoskeletal System:  Hips:  General/bilateral: ° No swelling of the hips. ° No induration of the hips.  Right Hip: ° Motion was normal. ° No pain was elicited by active internal rotation with the hip flexed.  ° No pain was elicited by active external rotation with the hip flexed. ° No pain was elicited by active motion. ° No pain was elicited by passive motion.  Left Hip: ° Motion was normal.  Left Knee: ° Examined. ° Positive effusion. °Localized swelling. °Genu varum. °Patella demonstrated crepitus. °Anteromedial aspect was tender on palpation. °Medial aspect was tender on palpation.  patella  Left Knee:  Knee Motion: Value   Active " flexion   115   degrees  Active extension  5    degrees    left knee ° Pain was elicited throughout the range of motion. ° Swelling with a negative fluctuation test. ° No erythema. ° No warmth. ° Anterior aspect was not tender on palpation. ° Patellofemoral region was not tender on palpation. ° Medial collateral ligament was not tender on palpation. ° Lateral collateral ligament was not tender on palpation. ° No pain was elicited by motion using [place]'s apprehension test. ° No laxity of the posterior cruciate ligament. ° No anterior drawer sign was present. ° No one plane medial (straight) instability. ° No one plane lateral (straight) instability. ° A Lachman test did not demonstrate one plane anterior instability. ° Huntley's sign was not observed for chondromalacia.    Right Knee:  Knee Motion: Value Normal Range  Active flexion  115     degrees  Active extension    10   degrees    Foot:  Left Foot: ° No excessive pronation. ° No excessive supination.  Functional Exam:  General/bilateral: ° Ambulation did not require a cane. ° Ambulation did not require a walker.  Neurological:  Sensation: ° No sensory exam abnormalities were noted.  Motor: ° Strength was normal. ° No weakness of the right hip was observed. ° No weakness of the left hip was observed.  Gait and Stance: ° A left-sided antalgic gait was observed.  Skin:  Injury / Incision Site: ° Left knee showed no tissue injury scar.      TESTS  Imaging:  X-Ray Knee:  AP and lateral view x-rays of the left knee with sunrise view of the patella were performed -of left knee.    IMPRESSIONS RADIOLOGY TEST     Patient has bilateral varus osteoarthritis with complete obliteration of joint space and sclerosis ,bone  spur formation essentially ,bone-on-bone knee  joints.  Assessment: There are no diagnoses linked to this encounter.    Plan:  Patient referred total  joint class.  He wants to proceed with a right total knee replacement  We will also start a course of  Synvisc injections in his left knee  Advised that he will need to lose weight also in the cardiac clearance    DISCLAIMER: This note may have been dictated using voice recognition software and may contain grammatical errors.     NOTE: Consult report sent to referring provider via [employer] EMR.

## 2022-08-03 ENCOUNTER — OFFICE VISIT (OUTPATIENT)
Dept: ORTHOPEDICS | Facility: CLINIC | Age: 77
End: 2022-08-03
Payer: MEDICARE

## 2022-08-03 VITALS — BODY MASS INDEX: 41.23 KG/M2 | HEIGHT: 70 IN | WEIGHT: 288 LBS

## 2022-08-03 DIAGNOSIS — M17.12 PRIMARY OSTEOARTHRITIS OF LEFT KNEE: Primary | ICD-10-CM

## 2022-08-03 PROCEDURE — 99499 NO LOS: ICD-10-PCS | Mod: ,,, | Performed by: PHYSICIAN ASSISTANT

## 2022-08-03 PROCEDURE — 99499 UNLISTED E&M SERVICE: CPT | Mod: ,,, | Performed by: PHYSICIAN ASSISTANT

## 2022-08-03 PROCEDURE — 20610 LARGE JOINT ASPIRATION/INJECTION: L KNEE: ICD-10-PCS | Mod: LT,,, | Performed by: PHYSICIAN ASSISTANT

## 2022-08-03 PROCEDURE — 20610 DRAIN/INJ JOINT/BURSA W/O US: CPT | Mod: LT,,, | Performed by: PHYSICIAN ASSISTANT

## 2022-08-03 RX ORDER — LIDOCAINE HYDROCHLORIDE 20 MG/ML
2 INJECTION, SOLUTION INFILTRATION; PERINEURAL
Status: SHIPPED | OUTPATIENT
Start: 2022-08-03

## 2022-08-03 RX ADMIN — LIDOCAINE HYDROCHLORIDE 2 ML: 20 INJECTION, SOLUTION INFILTRATION; PERINEURAL at 09:08

## 2022-08-03 NOTE — PROCEDURES
Large Joint Aspiration/Injection: L knee    Date/Time: 8/3/2022 9:45 AM  Performed by: Levon Cortes PA-C  Authorized by: Levon Cortes PA-C     Consent Done?:  Yes (Verbal)  Indications:  Pain  Site marked: the procedure site was marked    Timeout: prior to procedure the correct patient, procedure, and site was verified    Prep: patient was prepped and draped in usual sterile fashion      Local anesthesia used?: Yes    Local anesthetic:  Topical anesthetic and lidocaine 2% without epinephrine  Ultrasonic Guidance for needle placement?: No    Approach:  Superior  Location:  Knee  Site:  L knee  Medications:  2 mL LIDOcaine HCL 20 mg/ml (2%) 20 mg/mL (2 %); 16 mg hyaluronate 16 mg/2 mL  Patient tolerance:  Patient tolerated the procedure well with no immediate complications

## 2022-08-10 ENCOUNTER — OFFICE VISIT (OUTPATIENT)
Dept: ORTHOPEDICS | Facility: CLINIC | Age: 77
End: 2022-08-10
Payer: MEDICARE

## 2022-08-10 VITALS
SYSTOLIC BLOOD PRESSURE: 132 MMHG | HEART RATE: 62 BPM | HEIGHT: 70 IN | BODY MASS INDEX: 41.23 KG/M2 | WEIGHT: 288 LBS | DIASTOLIC BLOOD PRESSURE: 70 MMHG

## 2022-08-10 DIAGNOSIS — M17.12 PRIMARY OSTEOARTHRITIS OF LEFT KNEE: Primary | ICD-10-CM

## 2022-08-10 PROCEDURE — 99499 UNLISTED E&M SERVICE: CPT | Mod: ,,, | Performed by: PHYSICIAN ASSISTANT

## 2022-08-10 PROCEDURE — 99499 NO LOS: ICD-10-PCS | Mod: ,,, | Performed by: PHYSICIAN ASSISTANT

## 2022-08-10 PROCEDURE — 20610 DRAIN/INJ JOINT/BURSA W/O US: CPT | Mod: LT,,, | Performed by: PHYSICIAN ASSISTANT

## 2022-08-10 PROCEDURE — 20610 LARGE JOINT ASPIRATION/INJECTION: L KNEE: ICD-10-PCS | Mod: LT,,, | Performed by: PHYSICIAN ASSISTANT

## 2022-08-10 RX ADMIN — LIDOCAINE HYDROCHLORIDE 2 ML: 20 INJECTION, SOLUTION INFILTRATION; PERINEURAL at 01:08

## 2022-08-10 NOTE — PROCEDURES
Large Joint Aspiration/Injection: L knee    Date/Time: 8/10/2022 1:30 PM  Performed by: Levon Cortes PA-C  Authorized by: Levon Cortes PA-C     Consent Done?:  Yes (Verbal)  Indications:  Pain  Site marked: the procedure site was marked    Timeout: prior to procedure the correct patient, procedure, and site was verified    Prep: patient was prepped and draped in usual sterile fashion      Local anesthesia used?: Yes    Local anesthetic:  Topical anesthetic and lidocaine 2% without epinephrine  Ultrasonic Guidance for needle placement?: No    Approach:  Superior  Location:  Knee  Site:  L knee  Medications:  2 mL LIDOcaine HCL 20 mg/ml (2%) 20 mg/mL (2 %); 16 mg hyaluronate 16 mg/2 mL  Patient tolerance:  Patient tolerated the procedure well with no immediate complications

## 2022-08-10 NOTE — PROGRESS NOTES
Patient presents today for 3rd visco-supplementation injection of the left knee      After verbal consent was obtained, the patient's knee was prepped and sterilely injected with Visco-supplementation.  Band-aid placed.  Patient did well following injection.

## 2022-08-15 RX ORDER — LIDOCAINE HYDROCHLORIDE 20 MG/ML
2 INJECTION, SOLUTION INFILTRATION; PERINEURAL
Status: DISCONTINUED | OUTPATIENT
Start: 2022-08-10 | End: 2022-08-15 | Stop reason: HOSPADM

## 2022-09-06 ENCOUNTER — OFFICE VISIT (OUTPATIENT)
Dept: ORTHOPEDICS | Facility: CLINIC | Age: 77
End: 2022-09-06
Payer: MEDICARE

## 2022-09-06 VITALS — BODY MASS INDEX: 41.23 KG/M2 | HEIGHT: 70 IN | WEIGHT: 288 LBS

## 2022-09-06 DIAGNOSIS — M17.11 PRIMARY OSTEOARTHRITIS OF RIGHT KNEE: Primary | ICD-10-CM

## 2022-09-06 PROCEDURE — 99214 PR OFFICE/OUTPT VISIT, EST, LEVL IV, 30-39 MIN: ICD-10-PCS | Mod: ,,, | Performed by: ORTHOPAEDIC SURGERY

## 2022-09-06 PROCEDURE — 99214 OFFICE O/P EST MOD 30 MIN: CPT | Mod: ,,, | Performed by: ORTHOPAEDIC SURGERY

## 2022-09-06 NOTE — PROGRESS NOTES
Past Medical History:   Diagnosis Date    COPD (chronic obstructive pulmonary disease)     Elevated cholesterol     Hypertension        Past Surgical History:   Procedure Laterality Date    gastric sleeve      kidney stones      TOTAL SHOULDER ARTHROPLASTY         Current Outpatient Medications   Medication Sig    amlodipine-atorvastatin (CADUET) 5-20 mg per tablet Take 1 tablet by mouth once daily.    atorvastatin (LIPITOR) 40 MG tablet Take 40 mg by mouth once daily.    carvediloL (COREG) 12.5 MG tablet Take 12.5 mg by mouth 2 (two) times daily with meals.    meclizine (ANTIVERT) 25 mg tablet Take 25 mg by mouth.    omega-3 acid ethyl esters (LOVAZA) 1 gram capsule Take 2 g by mouth 2 (two) times daily.    sertraline (ZOLOFT) 100 MG tablet Take 100 mg by mouth once daily.    tiotropium (SPIRIVA WITH HANDIHALER) 18 mcg inhalation capsule Inhale 18 mcg into the lungs.    triamterene-hydrochlorothiazide 37.5-25 mg (DYAZIDE) 37.5-25 mg per capsule Take 1 capsule by mouth every morning.    vitamin E 100 UNIT capsule Take 100 Units by mouth once daily.     No current facility-administered medications for this visit.     Facility-Administered Medications Ordered in Other Visits   Medication    hyaluronate (SYNVISC) 16 mg/2 mL injection 16 mg    LIDOcaine HCL 20 mg/ml (2%) injection 2 mL       Review of patient's allergies indicates:   Allergen Reactions    Sulfa (sulfonamide antibiotics)        History reviewed. No pertinent family history.    Social History     Socioeconomic History    Marital status:    Tobacco Use    Smoking status: Never    Smokeless tobacco: Never       Chief Complaint:   Chief Complaint   Patient presents with    Pain     Ref Dr. Ceja, right knee possible sx, last synivisc inj 6/15/22, pt states injections did not help with any relief, pt states getting hard to walk now , pt states already fell couple times due to knee, pt states knee will lock up, states taking otc tylenol to help with  pain relief,        History of present illness:  76-year-old male presents today for evaluation of bilateral knee pain.  Patient's history of knee pain both knees right as well as left.  Right is worse than left.  He has tried injections in the past.  Has also tried anti-inflammatories without significant relief.  Injections given no significantly.  He has tried viscosupplementation as well as steroids.  Patient feels as though he has reached a point disability regards to his knees and like to proceed with surgical intervention.      Review of Systems:    Constitution: Negative for chills, fever, and sweats.  Negative for unexplained weight loss.    HENT:  Negative for headaches and blurry vision.    Cardiovascular:Negative for chest pain or irregular heart beat. Negative for hypertension.    Respiratory:  Negative for cough and shortness of breath.    Gastrointestinal: Negative for abdominal pain, heartburn, melena, nausea, and vomitting.    Genitourinary:  Negative bladder incontinence and dysuria.    Musculoskeletal:  See HPI    Neurological: Negative for numbness.    Psychiatric/Behavioral: Negative for depression.  The patient is not nervous/anxious.      Endocrine: Negative for polyuria    Hematologic/Lymphatic: Negative for bleeding problem.  Does not bruise/bleed easily.    Skin: Negative for poor would healing and rash      Physical Examination:    Vital Signs:  There were no vitals filed for this visit.    Body mass index is 41.32 kg/m².    General: No acute distress, alert and oriented, healthy appearing    HEENT: Head is atraumatic, mucous membranes are moist    Neck: Supples, no JVD    Cardiovascular: Palpable dorsalis pedis and posterior tibial pulses, regular rate and rhythm to those pulses    Lungs: Breathing non-labored    Skin: no rashes appreciated    Neurologic: Can flex and extend knees, ankles, and toes. Sensation is grossly intact    Bilateral knees:  Sensation intact distally.  Brisk cap  refill distally range of motion of both knees significant pain discomfort.  He has varus alignment that is not correctable.  Extension of the right knee to 5.  Flexion of 105    X-rays:  X-rays reviewed.  Patient end-stage arthritis right knee with loss of joint space and bone-on-bone articulation the right as well as left knee.     Assessment::  Right knee primary osteoarthritis    Plan:  At this point the patient is tried and failed all conservative management with regards to their knee. They have tried and failed nonoperative management including: Anti-inflammatories, activity modification, injections. All of these have failed to completely remove their pain. They have pain going up and down stairs as well as walking on level ground. The knee pain is affecting activities of daily living They feel that theyve reached a point of disability with regards to their knee. X-rays reveal advanced, end-stage degenerative osteoarthritis as noted by subchondral sclerosis, joint space narrowing in the medial, lateral, patellofemoral compartment, and periarticular osteophytes. The patient would like to proceed with surgical intervention and would be a good candidate for total knee replacement.    Total knee arthroplasty procedure, alternatives, risks, and benefits were discussed in detail. The risks including but not limited to: infection, need for revision surgery, pain, swelling, loosening, injury to surrounding neurovascular structures, stiffness, incomplete resolution of pain, DVT, PE, and death were discussed in detail. Despite these risks, the patient would like to proceed with surgical intervention. All questions were answered, no guarantees made. Will plan for right TKA on October 10th.      This note was created using [employer] voice recognition software that occasionally misinterpreted phrases or words.    Consult note is delivered via [employer] messaging service.

## 2022-09-22 ENCOUNTER — HOSPITAL ENCOUNTER (OUTPATIENT)
Dept: RADIOLOGY | Facility: HOSPITAL | Age: 77
Discharge: HOME | End: 2022-09-22
Attending: NURSE PRACTITIONER
Payer: MEDICARE

## 2022-09-22 ENCOUNTER — OFFICE VISIT (OUTPATIENT)
Dept: ORTHOPEDICS | Facility: CLINIC | Age: 77
End: 2022-09-22
Payer: MEDICARE

## 2022-09-22 VITALS
WEIGHT: 288 LBS | HEIGHT: 70 IN | DIASTOLIC BLOOD PRESSURE: 74 MMHG | SYSTOLIC BLOOD PRESSURE: 142 MMHG | BODY MASS INDEX: 41.23 KG/M2 | HEART RATE: 61 BPM

## 2022-09-22 DIAGNOSIS — M17.11 PRIMARY OSTEOARTHRITIS OF RIGHT KNEE: ICD-10-CM

## 2022-09-22 DIAGNOSIS — M19.90 OSTEOARTHRITIS: ICD-10-CM

## 2022-09-22 DIAGNOSIS — M17.11 PRIMARY OSTEOARTHRITIS OF RIGHT KNEE: Primary | ICD-10-CM

## 2022-09-22 DIAGNOSIS — Z01.818 PREOPERATIVE TESTING: ICD-10-CM

## 2022-09-22 PROCEDURE — 99214 PR OFFICE/OUTPT VISIT, EST, LEVL IV, 30-39 MIN: ICD-10-PCS | Mod: ,,, | Performed by: ORTHOPAEDIC SURGERY

## 2022-09-22 PROCEDURE — 73700 CT LOWER EXTREMITY W/O DYE: CPT | Mod: TC,RT

## 2022-09-22 PROCEDURE — 71046 X-RAY EXAM CHEST 2 VIEWS: CPT | Mod: TC

## 2022-09-22 PROCEDURE — 99214 OFFICE O/P EST MOD 30 MIN: CPT | Mod: ,,, | Performed by: ORTHOPAEDIC SURGERY

## 2022-09-22 RX ORDER — NAPROXEN SODIUM 220 MG/1
162 TABLET, FILM COATED ORAL 2 TIMES DAILY
Status: CANCELLED | OUTPATIENT
Start: 2022-09-22

## 2022-09-22 RX ORDER — GABAPENTIN 100 MG/1
600 CAPSULE ORAL
Status: CANCELLED | OUTPATIENT
Start: 2022-09-22

## 2022-09-22 RX ORDER — SODIUM CHLORIDE, SODIUM GLUCONATE, SODIUM ACETATE, POTASSIUM CHLORIDE AND MAGNESIUM CHLORIDE 30; 37; 368; 526; 502 MG/100ML; MG/100ML; MG/100ML; MG/100ML; MG/100ML
1000 INJECTION, SOLUTION INTRAVENOUS CONTINUOUS
Status: CANCELLED | OUTPATIENT
Start: 2022-09-22

## 2022-09-22 RX ORDER — TRANEXAMIC ACID 650 MG/1
1950 TABLET ORAL
Status: CANCELLED | OUTPATIENT
Start: 2022-09-22 | End: 2022-09-22

## 2022-09-22 RX ORDER — ACETAMINOPHEN 500 MG
1000 TABLET ORAL
Status: CANCELLED | OUTPATIENT
Start: 2022-09-22

## 2022-09-22 RX ORDER — KETOROLAC TROMETHAMINE 10 MG/1
10 TABLET, FILM COATED ORAL ONCE
Status: CANCELLED | OUTPATIENT
Start: 2022-09-22 | End: 2022-09-27

## 2022-09-22 RX ORDER — SCOLOPAMINE TRANSDERMAL SYSTEM 1 MG/1
1 PATCH, EXTENDED RELEASE TRANSDERMAL ONCE AS NEEDED
Status: CANCELLED | OUTPATIENT
Start: 2022-09-22 | End: 2034-02-18

## 2022-09-22 NOTE — PROGRESS NOTES
Past Medical History:   Diagnosis Date    COPD (chronic obstructive pulmonary disease)     Elevated cholesterol     Hypertension        Past Surgical History:   Procedure Laterality Date    gastric sleeve      kidney stones      TOTAL SHOULDER ARTHROPLASTY         Current Outpatient Medications   Medication Sig    amlodipine-atorvastatin (CADUET) 5-20 mg per tablet Take 1 tablet by mouth once daily.    atorvastatin (LIPITOR) 40 MG tablet Take 40 mg by mouth once daily.    carvediloL (COREG) 12.5 MG tablet Take 12.5 mg by mouth 2 (two) times daily with meals.    meclizine (ANTIVERT) 25 mg tablet Take 25 mg by mouth.    omega-3 acid ethyl esters (LOVAZA) 1 gram capsule Take 2 g by mouth 2 (two) times daily.    sertraline (ZOLOFT) 100 MG tablet Take 100 mg by mouth once daily.    triamterene-hydrochlorothiazide 37.5-25 mg (DYAZIDE) 37.5-25 mg per capsule Take 1 capsule by mouth every morning.    vitamin E 100 UNIT capsule Take 100 Units by mouth once daily.    tiotropium (SPIRIVA WITH HANDIHALER) 18 mcg inhalation capsule Inhale 18 mcg into the lungs.     No current facility-administered medications for this visit.     Facility-Administered Medications Ordered in Other Visits   Medication    hyaluronate (SYNVISC) 16 mg/2 mL injection 16 mg    LIDOcaine HCL 20 mg/ml (2%) injection 2 mL       Review of patient's allergies indicates:   Allergen Reactions    Sulfa (sulfonamide antibiotics)        History reviewed. No pertinent family history.    Social History     Socioeconomic History    Marital status:    Tobacco Use    Smoking status: Never    Smokeless tobacco: Never   Substance and Sexual Activity    Alcohol use: Yes     Alcohol/week: 7.0 standard drinks     Types: 7 Cans of beer per week       Chief Complaint:   Chief Complaint   Patient presents with    Right Knee - Pain    Pre-op Exam     pre op for right TKA on 10/10/22, reports aching pain in knee,        History of present illness:  76-year-old male  presents today for evaluation of bilateral knee pain.  Patient's history of knee pain both knees right as well as left.  Right is worse than left.  He has tried injections in the past.  Has also tried anti-inflammatories without significant relief.  Injections given no significantly.  He has tried viscosupplementation as well as steroids. This pain has begun to affect his daily living activities. Patient feels as though he has reached a point disability regards to his knees and like to proceed with surgical intervention.      Review of Systems:    Constitution: Negative for chills, fever, and sweats.  Negative for unexplained weight loss.    HENT:  Negative for headaches and blurry vision.    Cardiovascular:Negative for chest pain or irregular heart beat. Negative for hypertension.    Respiratory:  Negative for cough and shortness of breath.    Gastrointestinal: Negative for abdominal pain, heartburn, melena, nausea, and vomitting.    Genitourinary:  Negative bladder incontinence and dysuria.    Musculoskeletal:  See HPI    Neurological: Negative for numbness.    Psychiatric/Behavioral: Negative for depression.  The patient is not nervous/anxious.      Endocrine: Negative for polyuria    Hematologic/Lymphatic: Negative for bleeding problem.  Does not bruise/bleed easily.    Skin: Negative for poor would healing and rash      Physical Examination:    Vital Signs:    Vitals:    09/22/22 0949   BP: (!) 142/74   Pulse: 61       Body mass index is 41.32 kg/m².    General: No acute distress, alert and oriented, healthy appearing    HEENT: Head is atraumatic, mucous membranes are moist    Neck: Supples, no JVD    Cardiovascular: Palpable dorsalis pedis and posterior tibial pulses, regular rate and rhythm to those pulses    Lungs: Breathing non-labored    Skin: no rashes appreciated    Neurologic: Can flex and extend knees, ankles, and toes. Sensation is grossly intact    Bilateral knees:  Sensation intact distally.  Brisk  cap refill distally range of motion of both knees significant pain discomfort.  He has varus alignment that is not correctable.  Extension of the right knee to 5.  Flexion of 105    X-rays:  X-rays reviewed.  Patient end-stage arthritis right knee with loss of joint space and bone-on-bone articulation the right as well as left knee.     Assessment::  Right knee primary osteoarthritis    Plan:  At this point the patient is tried and failed all conservative management with regards to their knee. They have tried and failed nonoperative management including: Anti-inflammatories, activity modification, injections. All of these have failed to completely remove their pain. They have pain going up and down stairs as well as walking on level ground. The knee pain is affecting activities of daily living They feel that theyve reached a point of disability with regards to their knee. X-rays reveal advanced, end-stage degenerative osteoarthritis as noted by subchondral sclerosis, joint space narrowing in the medial, lateral, patellofemoral compartment, and periarticular osteophytes. The patient would like to proceed with surgical intervention and would be a good candidate for total knee replacement.    Total knee arthroplasty procedure, alternatives, risks, and benefits were discussed in detail. The risks including but not limited to: infection, need for revision surgery, pain, swelling, loosening, injury to surrounding neurovascular structures, stiffness, incomplete resolution of pain, DVT, PE, and death were discussed in detail. Despite these risks, the patient would like to proceed with surgical intervention. All questions were answered, no guarantees made. Will plan for right TKA on October 10th.    The patient has a history of pulmonary disease and is at higher risk of adverse effects of surgery and anesthesia. We will admit as inpatient and expect the patient to stay two nights in the hospital. We'll administer oxygen  therapy, close monitoring of oxygenation, respiratory status, incentive spirometry and nebs when necessary. Plan for discharge once the patient is oxygenating well, stable and at baseline    The above findings, diagnostics, and treatment plan were discussed with Dr. Omer Ibrahim who is in agreement with the plan of care.        This note was created using [employer] voice recognition software that occasionally misinterpreted phrases or words.    Consult note is delivered via Epic messaging service.

## 2022-09-22 NOTE — H&P (VIEW-ONLY)
Past Medical History:   Diagnosis Date    COPD (chronic obstructive pulmonary disease)     Elevated cholesterol     Hypertension        Past Surgical History:   Procedure Laterality Date    gastric sleeve      kidney stones      TOTAL SHOULDER ARTHROPLASTY         Current Outpatient Medications   Medication Sig    amlodipine-atorvastatin (CADUET) 5-20 mg per tablet Take 1 tablet by mouth once daily.    atorvastatin (LIPITOR) 40 MG tablet Take 40 mg by mouth once daily.    carvediloL (COREG) 12.5 MG tablet Take 12.5 mg by mouth 2 (two) times daily with meals.    meclizine (ANTIVERT) 25 mg tablet Take 25 mg by mouth.    omega-3 acid ethyl esters (LOVAZA) 1 gram capsule Take 2 g by mouth 2 (two) times daily.    sertraline (ZOLOFT) 100 MG tablet Take 100 mg by mouth once daily.    triamterene-hydrochlorothiazide 37.5-25 mg (DYAZIDE) 37.5-25 mg per capsule Take 1 capsule by mouth every morning.    vitamin E 100 UNIT capsule Take 100 Units by mouth once daily.    tiotropium (SPIRIVA WITH HANDIHALER) 18 mcg inhalation capsule Inhale 18 mcg into the lungs.     No current facility-administered medications for this visit.     Facility-Administered Medications Ordered in Other Visits   Medication    hyaluronate (SYNVISC) 16 mg/2 mL injection 16 mg    LIDOcaine HCL 20 mg/ml (2%) injection 2 mL       Review of patient's allergies indicates:   Allergen Reactions    Sulfa (sulfonamide antibiotics)        History reviewed. No pertinent family history.    Social History     Socioeconomic History    Marital status:    Tobacco Use    Smoking status: Never    Smokeless tobacco: Never   Substance and Sexual Activity    Alcohol use: Yes     Alcohol/week: 7.0 standard drinks     Types: 7 Cans of beer per week       Chief Complaint:   Chief Complaint   Patient presents with    Right Knee - Pain    Pre-op Exam     pre op for right TKA on 10/10/22, reports aching pain in knee,        History of present illness:  76-year-old male  presents today for evaluation of bilateral knee pain.  Patient's history of knee pain both knees right as well as left.  Right is worse than left.  He has tried injections in the past.  Has also tried anti-inflammatories without significant relief.  Injections given no significantly.  He has tried viscosupplementation as well as steroids. This pain has begun to affect his daily living activities. Patient feels as though he has reached a point disability regards to his knees and like to proceed with surgical intervention.      Review of Systems:    Constitution: Negative for chills, fever, and sweats.  Negative for unexplained weight loss.    HENT:  Negative for headaches and blurry vision.    Cardiovascular:Negative for chest pain or irregular heart beat. Negative for hypertension.    Respiratory:  Negative for cough and shortness of breath.    Gastrointestinal: Negative for abdominal pain, heartburn, melena, nausea, and vomitting.    Genitourinary:  Negative bladder incontinence and dysuria.    Musculoskeletal:  See HPI    Neurological: Negative for numbness.    Psychiatric/Behavioral: Negative for depression.  The patient is not nervous/anxious.      Endocrine: Negative for polyuria    Hematologic/Lymphatic: Negative for bleeding problem.  Does not bruise/bleed easily.    Skin: Negative for poor would healing and rash      Physical Examination:    Vital Signs:    Vitals:    09/22/22 0949   BP: (!) 142/74   Pulse: 61       Body mass index is 41.32 kg/m².    General: No acute distress, alert and oriented, healthy appearing    HEENT: Head is atraumatic, mucous membranes are moist    Neck: Supples, no JVD    Cardiovascular: Palpable dorsalis pedis and posterior tibial pulses, regular rate and rhythm to those pulses    Lungs: Breathing non-labored    Skin: no rashes appreciated    Neurologic: Can flex and extend knees, ankles, and toes. Sensation is grossly intact    Bilateral knees:  Sensation intact distally.  Brisk  cap refill distally range of motion of both knees significant pain discomfort.  He has varus alignment that is not correctable.  Extension of the right knee to 5.  Flexion of 105    X-rays:  X-rays reviewed.  Patient end-stage arthritis right knee with loss of joint space and bone-on-bone articulation the right as well as left knee.     Assessment::  Right knee primary osteoarthritis    Plan:  At this point the patient is tried and failed all conservative management with regards to their knee. They have tried and failed nonoperative management including: Anti-inflammatories, activity modification, injections. All of these have failed to completely remove their pain. They have pain going up and down stairs as well as walking on level ground. The knee pain is affecting activities of daily living They feel that theyve reached a point of disability with regards to their knee. X-rays reveal advanced, end-stage degenerative osteoarthritis as noted by subchondral sclerosis, joint space narrowing in the medial, lateral, patellofemoral compartment, and periarticular osteophytes. The patient would like to proceed with surgical intervention and would be a good candidate for total knee replacement.    Total knee arthroplasty procedure, alternatives, risks, and benefits were discussed in detail. The risks including but not limited to: infection, need for revision surgery, pain, swelling, loosening, injury to surrounding neurovascular structures, stiffness, incomplete resolution of pain, DVT, PE, and death were discussed in detail. Despite these risks, the patient would like to proceed with surgical intervention. All questions were answered, no guarantees made. Will plan for right TKA on October 10th.    The patient has a history of pulmonary disease and is at higher risk of adverse effects of surgery and anesthesia. We will admit as inpatient and expect the patient to stay two nights in the hospital. We'll administer oxygen  therapy, close monitoring of oxygenation, respiratory status, incentive spirometry and nebs when necessary. Plan for discharge once the patient is oxygenating well, stable and at baseline    The above findings, diagnostics, and treatment plan were discussed with Dr. Omer Ibrahim who is in agreement with the plan of care.        This note was created using [employer] voice recognition software that occasionally misinterpreted phrases or words.    Consult note is delivered via Epic messaging service.

## 2022-09-27 ENCOUNTER — TELEPHONE (OUTPATIENT)
Dept: PREADMISSION TESTING | Facility: HOSPITAL | Age: 77
End: 2022-09-27

## 2022-10-06 NOTE — PROGRESS NOTES
This patient has  Increased pain with activity Initiation  Interference with normal activities of daily living due to pain  Increased pain with weight bearing activities  Pain with range of motion    Limited ROM  Crepitus  Joint effusion or swelling   Bone-on-bone contact by imaging  No active infection  Joint replacement Planned      Patient will be admitted as inpatient status due to: Taking advanced age, medical co-morbidities and PLOF into consideration, the patient is at higher risk for falls, dehydration, electrolyte imbalance, post-operative anemia and a higher risk of adverse effects related to surgery and anesthesia. Will admit as inpatient for closer monitoring of vital signs for hypotension, level of consciousness, respiratory status and the patient's overall response to narcotics. Will also monitor labs daily, replace electrolytes and provide fluid resuscitation as needed and plan to facilitate discharge once patient is hemodynamically stable, electrolytes WNL, tolerating pain and narcotics appropriately and once the patient has been deemed safe from a mobility/safety standpoint.

## 2022-10-07 ENCOUNTER — ANESTHESIA EVENT (OUTPATIENT)
Dept: SURGERY | Facility: HOSPITAL | Age: 77
DRG: 470 | End: 2022-10-07
Payer: MEDICARE

## 2022-10-10 ENCOUNTER — ANESTHESIA (OUTPATIENT)
Dept: SURGERY | Facility: HOSPITAL | Age: 77
DRG: 470 | End: 2022-10-10
Payer: MEDICARE

## 2022-10-10 ENCOUNTER — HOSPITAL ENCOUNTER (INPATIENT)
Facility: HOSPITAL | Age: 77
LOS: 2 days | Discharge: HOME | DRG: 470 | End: 2022-10-12
Attending: ORTHOPAEDIC SURGERY | Admitting: ORTHOPAEDIC SURGERY
Payer: MEDICARE

## 2022-10-10 DIAGNOSIS — M17.11 PRIMARY OSTEOARTHRITIS OF RIGHT KNEE: ICD-10-CM

## 2022-10-10 DIAGNOSIS — Z01.818 PREOPERATIVE TESTING: ICD-10-CM

## 2022-10-10 DIAGNOSIS — Z01.818 PREOP TESTING: ICD-10-CM

## 2022-10-10 DIAGNOSIS — M19.90 OSTEOARTHRITIS: ICD-10-CM

## 2022-10-10 LAB
HCT VFR BLD AUTO: 39.7 % (ref 42–52)
HGB BLD-MCNC: 13.2 GM/DL (ref 14–18)
POCT GLUCOSE: 100 MG/DL (ref 70–110)

## 2022-10-10 PROCEDURE — 99900035 HC TECH TIME PER 15 MIN (STAT)

## 2022-10-10 PROCEDURE — 63600175 PHARM REV CODE 636 W HCPCS: Performed by: ORTHOPAEDIC SURGERY

## 2022-10-10 PROCEDURE — 11000001 HC ACUTE MED/SURG PRIVATE ROOM

## 2022-10-10 PROCEDURE — 71000033 HC RECOVERY, INTIAL HOUR: Performed by: ORTHOPAEDIC SURGERY

## 2022-10-10 PROCEDURE — A4216 STERILE WATER/SALINE, 10 ML: HCPCS | Performed by: ORTHOPAEDIC SURGERY

## 2022-10-10 PROCEDURE — C1776 JOINT DEVICE (IMPLANTABLE): HCPCS | Performed by: ORTHOPAEDIC SURGERY

## 2022-10-10 PROCEDURE — 63600175 PHARM REV CODE 636 W HCPCS: Performed by: NURSE ANESTHETIST, CERTIFIED REGISTERED

## 2022-10-10 PROCEDURE — 20985 PR CPTR-ASST SURGICAL NAVIGATION IMAGE-LESS: ICD-10-PCS | Mod: ,,, | Performed by: ORTHOPAEDIC SURGERY

## 2022-10-10 PROCEDURE — 94761 N-INVAS EAR/PLS OXIMETRY MLT: CPT

## 2022-10-10 PROCEDURE — 25000003 PHARM REV CODE 250: Performed by: ORTHOPAEDIC SURGERY

## 2022-10-10 PROCEDURE — 30000890 HC MISC. SEND OUT TEST: Performed by: ORTHOPAEDIC SURGERY

## 2022-10-10 PROCEDURE — C1713 ANCHOR/SCREW BN/BN,TIS/BN: HCPCS | Performed by: ORTHOPAEDIC SURGERY

## 2022-10-10 PROCEDURE — 37000008 HC ANESTHESIA 1ST 15 MINUTES: Performed by: ORTHOPAEDIC SURGERY

## 2022-10-10 PROCEDURE — 36415 COLL VENOUS BLD VENIPUNCTURE: CPT | Performed by: ORTHOPAEDIC SURGERY

## 2022-10-10 PROCEDURE — 88311 DECALCIFY TISSUE: CPT | Performed by: ORTHOPAEDIC SURGERY

## 2022-10-10 PROCEDURE — 82962 GLUCOSE BLOOD TEST: CPT | Performed by: ORTHOPAEDIC SURGERY

## 2022-10-10 PROCEDURE — 20985 CPTR-ASST DIR MS PX: CPT | Mod: ,,, | Performed by: ORTHOPAEDIC SURGERY

## 2022-10-10 PROCEDURE — 88305 TISSUE EXAM BY PATHOLOGIST: CPT | Performed by: ORTHOPAEDIC SURGERY

## 2022-10-10 PROCEDURE — 85014 HEMATOCRIT: CPT | Performed by: ORTHOPAEDIC SURGERY

## 2022-10-10 PROCEDURE — 25000003 PHARM REV CODE 250: Performed by: NURSE PRACTITIONER

## 2022-10-10 PROCEDURE — 36000713 HC OR TIME LEV V EA ADD 15 MIN: Performed by: ORTHOPAEDIC SURGERY

## 2022-10-10 PROCEDURE — 51798 US URINE CAPACITY MEASURE: CPT

## 2022-10-10 PROCEDURE — 25000003 PHARM REV CODE 250: Performed by: NURSE ANESTHETIST, CERTIFIED REGISTERED

## 2022-10-10 PROCEDURE — 27447 PR TOTAL KNEE ARTHROPLASTY: ICD-10-PCS | Mod: RT,,, | Performed by: ORTHOPAEDIC SURGERY

## 2022-10-10 PROCEDURE — 97162 PT EVAL MOD COMPLEX 30 MIN: CPT

## 2022-10-10 PROCEDURE — 36000712 HC OR TIME LEV V 1ST 15 MIN: Performed by: ORTHOPAEDIC SURGERY

## 2022-10-10 PROCEDURE — 37000009 HC ANESTHESIA EA ADD 15 MINS: Performed by: ORTHOPAEDIC SURGERY

## 2022-10-10 PROCEDURE — 94799 UNLISTED PULMONARY SVC/PX: CPT

## 2022-10-10 PROCEDURE — 27201423 OPTIME MED/SURG SUP & DEVICES STERILE SUPPLY: Performed by: ORTHOPAEDIC SURGERY

## 2022-10-10 PROCEDURE — 30000890 SPECIMEN TO PATHOLOGY: Performed by: ORTHOPAEDIC SURGERY

## 2022-10-10 PROCEDURE — 25000003 PHARM REV CODE 250

## 2022-10-10 PROCEDURE — 27447 TOTAL KNEE ARTHROPLASTY: CPT | Mod: RT,,, | Performed by: ORTHOPAEDIC SURGERY

## 2022-10-10 DEVICE — PATELLA
Type: IMPLANTABLE DEVICE | Site: KNEE | Status: FUNCTIONAL
Brand: TRIATHLON

## 2022-10-10 DEVICE — CRUCIATE RETAINING FEMORAL
Type: IMPLANTABLE DEVICE | Site: KNEE | Status: FUNCTIONAL
Brand: TRIATHLON

## 2022-10-10 DEVICE — TIBIAL COMPONENT
Type: IMPLANTABLE DEVICE | Site: KNEE | Status: FUNCTIONAL
Brand: TRIATHLON

## 2022-10-10 DEVICE — TIBIAL BEARING INSERT - CS
Type: IMPLANTABLE DEVICE | Site: KNEE | Status: FUNCTIONAL
Brand: TRIATHLON

## 2022-10-10 RX ORDER — SODIUM CHLORIDE 9 MG/ML
INJECTION, SOLUTION INTRAVENOUS CONTINUOUS
Status: DISCONTINUED | OUTPATIENT
Start: 2022-10-10 | End: 2022-10-12 | Stop reason: HOSPADM

## 2022-10-10 RX ORDER — LIDOCAINE HYDROCHLORIDE 10 MG/ML
1 INJECTION, SOLUTION EPIDURAL; INFILTRATION; INTRACAUDAL; PERINEURAL ONCE
Status: DISCONTINUED | OUTPATIENT
Start: 2022-10-10 | End: 2022-10-10

## 2022-10-10 RX ORDER — ONDANSETRON 4 MG/1
4 TABLET, ORALLY DISINTEGRATING ORAL ONCE
Status: DISCONTINUED | OUTPATIENT
Start: 2022-10-10 | End: 2022-10-10

## 2022-10-10 RX ORDER — ROPIVACAINE HYDROCHLORIDE 5 MG/ML
INJECTION, SOLUTION EPIDURAL; INFILTRATION; PERINEURAL
Status: DISCONTINUED | OUTPATIENT
Start: 2022-10-10 | End: 2022-10-10 | Stop reason: HOSPADM

## 2022-10-10 RX ORDER — EPINEPHRINE 1 MG/ML
INJECTION, SOLUTION INTRACARDIAC; INTRAMUSCULAR; INTRAVENOUS; SUBCUTANEOUS
Status: DISCONTINUED | OUTPATIENT
Start: 2022-10-10 | End: 2022-10-10 | Stop reason: HOSPADM

## 2022-10-10 RX ORDER — AMOXICILLIN 250 MG
2 CAPSULE ORAL 2 TIMES DAILY
Status: DISCONTINUED | OUTPATIENT
Start: 2022-10-10 | End: 2022-10-12 | Stop reason: HOSPADM

## 2022-10-10 RX ORDER — ONDANSETRON 2 MG/ML
4 INJECTION INTRAMUSCULAR; INTRAVENOUS EVERY 6 HOURS PRN
Status: DISCONTINUED | OUTPATIENT
Start: 2022-10-10 | End: 2022-10-12 | Stop reason: HOSPADM

## 2022-10-10 RX ORDER — TRANEXAMIC ACID 650 MG/1
1950 TABLET ORAL
Status: COMPLETED | OUTPATIENT
Start: 2022-10-10 | End: 2022-10-10

## 2022-10-10 RX ORDER — METOCLOPRAMIDE HYDROCHLORIDE 5 MG/ML
10 INJECTION INTRAMUSCULAR; INTRAVENOUS EVERY 10 MIN PRN
Status: DISCONTINUED | OUTPATIENT
Start: 2022-10-10 | End: 2022-10-10

## 2022-10-10 RX ORDER — TALC
6 POWDER (GRAM) TOPICAL NIGHTLY PRN
Status: DISCONTINUED | OUTPATIENT
Start: 2022-10-10 | End: 2022-10-12 | Stop reason: HOSPADM

## 2022-10-10 RX ORDER — SODIUM CHLORIDE, SODIUM GLUCONATE, SODIUM ACETATE, POTASSIUM CHLORIDE AND MAGNESIUM CHLORIDE 30; 37; 368; 526; 502 MG/100ML; MG/100ML; MG/100ML; MG/100ML; MG/100ML
1000 INJECTION, SOLUTION INTRAVENOUS CONTINUOUS
Status: DISCONTINUED | OUTPATIENT
Start: 2022-10-10 | End: 2022-10-10

## 2022-10-10 RX ORDER — FUROSEMIDE 20 MG/1
20 TABLET ORAL DAILY
COMMUNITY

## 2022-10-10 RX ORDER — SCOLOPAMINE TRANSDERMAL SYSTEM 1 MG/1
1 PATCH, EXTENDED RELEASE TRANSDERMAL ONCE AS NEEDED
Status: DISCONTINUED | OUTPATIENT
Start: 2022-10-10 | End: 2022-10-10

## 2022-10-10 RX ORDER — ALLOPURINOL 300 MG/1
300 TABLET ORAL DAILY
COMMUNITY

## 2022-10-10 RX ORDER — FUROSEMIDE 20 MG/1
20 TABLET ORAL DAILY
Status: DISCONTINUED | OUTPATIENT
Start: 2022-10-10 | End: 2022-10-12 | Stop reason: HOSPADM

## 2022-10-10 RX ORDER — SODIUM CHLORIDE 0.9 % (FLUSH) 0.9 %
SYRINGE (ML) INJECTION
Status: DISPENSED
Start: 2022-10-10 | End: 2022-10-10

## 2022-10-10 RX ORDER — ROPIVACAINE HYDROCHLORIDE 5 MG/ML
INJECTION, SOLUTION EPIDURAL; INFILTRATION; PERINEURAL
Status: DISPENSED
Start: 2022-10-10 | End: 2022-10-10

## 2022-10-10 RX ORDER — KETAMINE HYDROCHLORIDE 100 MG/ML
INJECTION, SOLUTION INTRAMUSCULAR; INTRAVENOUS
Status: DISCONTINUED | OUTPATIENT
Start: 2022-10-10 | End: 2022-10-10

## 2022-10-10 RX ORDER — DOCUSATE SODIUM 100 MG/1
200 CAPSULE, LIQUID FILLED ORAL DAILY
Status: DISCONTINUED | OUTPATIENT
Start: 2022-10-11 | End: 2022-10-12 | Stop reason: HOSPADM

## 2022-10-10 RX ORDER — CETIRIZINE HYDROCHLORIDE 10 MG/1
10 TABLET ORAL DAILY
COMMUNITY

## 2022-10-10 RX ORDER — KETOROLAC TROMETHAMINE 10 MG/1
10 TABLET, FILM COATED ORAL ONCE
Status: COMPLETED | OUTPATIENT
Start: 2022-10-10 | End: 2022-10-10

## 2022-10-10 RX ORDER — CEFAZOLIN SODIUM 2 G/100ML
2 INJECTION, SOLUTION INTRAVENOUS
Status: COMPLETED | OUTPATIENT
Start: 2022-10-10 | End: 2022-10-11

## 2022-10-10 RX ORDER — KETOROLAC TROMETHAMINE 30 MG/ML
INJECTION, SOLUTION INTRAMUSCULAR; INTRAVENOUS
Status: DISPENSED
Start: 2022-10-10 | End: 2022-10-10

## 2022-10-10 RX ORDER — LACTULOSE 10 G/15ML
20 SOLUTION ORAL EVERY 6 HOURS PRN
Status: DISCONTINUED | OUTPATIENT
Start: 2022-10-10 | End: 2022-10-12 | Stop reason: HOSPADM

## 2022-10-10 RX ORDER — KETOROLAC TROMETHAMINE 10 MG/1
10 TABLET, FILM COATED ORAL EVERY 6 HOURS
Status: DISCONTINUED | OUTPATIENT
Start: 2022-10-10 | End: 2022-10-12

## 2022-10-10 RX ORDER — DIPHENHYDRAMINE HYDROCHLORIDE 50 MG/ML
25 INJECTION INTRAMUSCULAR; INTRAVENOUS EVERY 6 HOURS PRN
Status: DISCONTINUED | OUTPATIENT
Start: 2022-10-10 | End: 2022-10-10

## 2022-10-10 RX ORDER — SODIUM CHLORIDE 9 MG/ML
INJECTION, SOLUTION INTRAMUSCULAR; INTRAVENOUS; SUBCUTANEOUS
Status: DISCONTINUED | OUTPATIENT
Start: 2022-10-10 | End: 2022-10-10 | Stop reason: HOSPADM

## 2022-10-10 RX ORDER — SERTRALINE HYDROCHLORIDE 50 MG/1
100 TABLET, FILM COATED ORAL DAILY
Status: DISCONTINUED | OUTPATIENT
Start: 2022-10-11 | End: 2022-10-12 | Stop reason: HOSPADM

## 2022-10-10 RX ORDER — MORPHINE SULFATE 10 MG/ML
INJECTION INTRAMUSCULAR; INTRAVENOUS; SUBCUTANEOUS
Status: DISCONTINUED | OUTPATIENT
Start: 2022-10-10 | End: 2022-10-10 | Stop reason: HOSPADM

## 2022-10-10 RX ORDER — EPINEPHRINE 1 MG/ML
INJECTION, SOLUTION INTRACARDIAC; INTRAMUSCULAR; INTRAVENOUS; SUBCUTANEOUS
Status: DISPENSED
Start: 2022-10-10 | End: 2022-10-10

## 2022-10-10 RX ORDER — NAPROXEN SODIUM 220 MG/1
162 TABLET, FILM COATED ORAL 2 TIMES DAILY
Status: DISCONTINUED | OUTPATIENT
Start: 2022-10-10 | End: 2022-10-10

## 2022-10-10 RX ORDER — TRAMADOL HYDROCHLORIDE 50 MG/1
50 TABLET ORAL EVERY 4 HOURS PRN
Status: DISCONTINUED | OUTPATIENT
Start: 2022-10-10 | End: 2022-10-12 | Stop reason: HOSPADM

## 2022-10-10 RX ORDER — METOCLOPRAMIDE HYDROCHLORIDE 5 MG/ML
10 INJECTION INTRAMUSCULAR; INTRAVENOUS
Status: COMPLETED | OUTPATIENT
Start: 2022-10-10 | End: 2022-10-11

## 2022-10-10 RX ORDER — ALLOPURINOL 300 MG/1
300 TABLET ORAL DAILY
Status: DISCONTINUED | OUTPATIENT
Start: 2022-10-11 | End: 2022-10-12 | Stop reason: HOSPADM

## 2022-10-10 RX ORDER — TAMSULOSIN HYDROCHLORIDE 0.4 MG/1
0.4 CAPSULE ORAL NIGHTLY
Status: DISCONTINUED | OUTPATIENT
Start: 2022-10-10 | End: 2022-10-12 | Stop reason: HOSPADM

## 2022-10-10 RX ORDER — CEFAZOLIN SODIUM IN 0.9 % NACL 3 G/100 ML
3 INTRAVENOUS SOLUTION, PIGGYBACK (ML) INTRAVENOUS
Status: DISCONTINUED | OUTPATIENT
Start: 2022-10-10 | End: 2022-10-10

## 2022-10-10 RX ORDER — GABAPENTIN 300 MG/1
600 CAPSULE ORAL
Status: COMPLETED | OUTPATIENT
Start: 2022-10-10 | End: 2022-10-10

## 2022-10-10 RX ORDER — MORPHINE SULFATE 4 MG/ML
4 INJECTION, SOLUTION INTRAMUSCULAR; INTRAVENOUS
Status: ACTIVE | OUTPATIENT
Start: 2022-10-10 | End: 2022-10-11

## 2022-10-10 RX ORDER — MORPHINE SULFATE 10 MG/ML
INJECTION INTRAMUSCULAR; INTRAVENOUS; SUBCUTANEOUS
Status: DISPENSED
Start: 2022-10-10 | End: 2022-10-10

## 2022-10-10 RX ORDER — MAG HYDROX/ALUMINUM HYD/SIMETH 200-200-20
30 SUSPENSION, ORAL (FINAL DOSE FORM) ORAL EVERY 6 HOURS PRN
Status: DISCONTINUED | OUTPATIENT
Start: 2022-10-10 | End: 2022-10-12 | Stop reason: HOSPADM

## 2022-10-10 RX ORDER — BUPIVACAINE HYDROCHLORIDE 7.5 MG/ML
INJECTION, SOLUTION INTRASPINAL
Status: DISPENSED
Start: 2022-10-10 | End: 2022-10-10

## 2022-10-10 RX ORDER — HYDROMORPHONE HYDROCHLORIDE 2 MG/ML
0.4 INJECTION, SOLUTION INTRAMUSCULAR; INTRAVENOUS; SUBCUTANEOUS EVERY 5 MIN PRN
Status: DISCONTINUED | OUTPATIENT
Start: 2022-10-10 | End: 2022-10-10

## 2022-10-10 RX ORDER — GABAPENTIN 300 MG/1
300 CAPSULE ORAL NIGHTLY
Status: DISCONTINUED | OUTPATIENT
Start: 2022-10-10 | End: 2022-10-11

## 2022-10-10 RX ORDER — MIDAZOLAM HYDROCHLORIDE 1 MG/ML
INJECTION INTRAMUSCULAR; INTRAVENOUS
Status: DISCONTINUED | OUTPATIENT
Start: 2022-10-10 | End: 2022-10-10

## 2022-10-10 RX ORDER — AMLODIPINE AND BENAZEPRIL HYDROCHLORIDE 5; 20 MG/1; MG/1
1 CAPSULE ORAL DAILY
Status: DISCONTINUED | OUTPATIENT
Start: 2022-10-11 | End: 2022-10-12 | Stop reason: HOSPADM

## 2022-10-10 RX ORDER — KETOROLAC TROMETHAMINE 30 MG/ML
INJECTION, SOLUTION INTRAMUSCULAR; INTRAVENOUS
Status: DISCONTINUED | OUTPATIENT
Start: 2022-10-10 | End: 2022-10-10 | Stop reason: HOSPADM

## 2022-10-10 RX ORDER — NAPROXEN SODIUM 220 MG/1
81 TABLET, FILM COATED ORAL 2 TIMES DAILY
Status: DISCONTINUED | OUTPATIENT
Start: 2022-10-11 | End: 2022-10-12 | Stop reason: HOSPADM

## 2022-10-10 RX ORDER — ACETAMINOPHEN 500 MG
1000 TABLET ORAL
Status: COMPLETED | OUTPATIENT
Start: 2022-10-10 | End: 2022-10-10

## 2022-10-10 RX ORDER — HYDROCODONE BITARTRATE AND ACETAMINOPHEN 5; 325 MG/1; MG/1
1 TABLET ORAL EVERY 4 HOURS PRN
Status: DISCONTINUED | OUTPATIENT
Start: 2022-10-10 | End: 2022-10-12 | Stop reason: HOSPADM

## 2022-10-10 RX ORDER — ONDANSETRON 2 MG/ML
INJECTION INTRAMUSCULAR; INTRAVENOUS
Status: DISCONTINUED | OUTPATIENT
Start: 2022-10-10 | End: 2022-10-10

## 2022-10-10 RX ORDER — BUPIVACAINE HYDROCHLORIDE 7.5 MG/ML
INJECTION, SOLUTION EPIDURAL; RETROBULBAR
Status: COMPLETED | OUTPATIENT
Start: 2022-10-10 | End: 2022-10-10

## 2022-10-10 RX ORDER — PHENYLEPHRINE HCL IN 0.9% NACL 1 MG/10 ML
SYRINGE (ML) INTRAVENOUS
Status: DISCONTINUED | OUTPATIENT
Start: 2022-10-10 | End: 2022-10-10

## 2022-10-10 RX ORDER — DEXAMETHASONE SODIUM PHOSPHATE 4 MG/ML
INJECTION, SOLUTION INTRA-ARTICULAR; INTRALESIONAL; INTRAMUSCULAR; INTRAVENOUS; SOFT TISSUE
Status: DISCONTINUED | OUTPATIENT
Start: 2022-10-10 | End: 2022-10-10

## 2022-10-10 RX ORDER — GLYCOPYRROLATE 0.2 MG/ML
INJECTION INTRAMUSCULAR; INTRAVENOUS
Status: DISCONTINUED | OUTPATIENT
Start: 2022-10-10 | End: 2022-10-10

## 2022-10-10 RX ORDER — FAMOTIDINE 20 MG/1
20 TABLET, FILM COATED ORAL 2 TIMES DAILY
Status: DISCONTINUED | OUTPATIENT
Start: 2022-10-10 | End: 2022-10-12 | Stop reason: HOSPADM

## 2022-10-10 RX ORDER — TAMSULOSIN HYDROCHLORIDE 0.4 MG/1
0.4 CAPSULE ORAL NIGHTLY
COMMUNITY

## 2022-10-10 RX ORDER — BISACODYL 10 MG
10 SUPPOSITORY, RECTAL RECTAL DAILY
Status: DISCONTINUED | OUTPATIENT
Start: 2022-10-13 | End: 2022-10-12 | Stop reason: HOSPADM

## 2022-10-10 RX ORDER — LIDOCAINE HYDROCHLORIDE 20 MG/ML
INJECTION INTRAVENOUS
Status: DISCONTINUED | OUTPATIENT
Start: 2022-10-10 | End: 2022-10-10

## 2022-10-10 RX ORDER — POLYETHYLENE GLYCOL 3350 17 G/17G
17 POWDER, FOR SOLUTION ORAL NIGHTLY
Status: DISCONTINUED | OUTPATIENT
Start: 2022-10-10 | End: 2022-10-12 | Stop reason: HOSPADM

## 2022-10-10 RX ORDER — ONDANSETRON 2 MG/ML
4 INJECTION INTRAMUSCULAR; INTRAVENOUS DAILY PRN
Status: DISCONTINUED | OUTPATIENT
Start: 2022-10-10 | End: 2022-10-10

## 2022-10-10 RX ORDER — MIDAZOLAM HYDROCHLORIDE 1 MG/ML
2 INJECTION INTRAMUSCULAR; INTRAVENOUS ONCE AS NEEDED
Status: DISCONTINUED | OUTPATIENT
Start: 2022-10-10 | End: 2022-10-10

## 2022-10-10 RX ORDER — ACETAMINOPHEN 10 MG/ML
1000 INJECTION, SOLUTION INTRAVENOUS ONCE
Status: COMPLETED | OUTPATIENT
Start: 2022-10-10 | End: 2022-10-10

## 2022-10-10 RX ORDER — PROPOFOL 10 MG/ML
VIAL (ML) INTRAVENOUS CONTINUOUS PRN
Status: DISCONTINUED | OUTPATIENT
Start: 2022-10-10 | End: 2022-10-10

## 2022-10-10 RX ORDER — METHOCARBAMOL 750 MG/1
750 TABLET, FILM COATED ORAL EVERY 8 HOURS PRN
Status: DISCONTINUED | OUTPATIENT
Start: 2022-10-10 | End: 2022-10-12 | Stop reason: HOSPADM

## 2022-10-10 RX ORDER — AMLODIPINE AND BENAZEPRIL HYDROCHLORIDE 5; 20 MG/1; MG/1
1 CAPSULE ORAL DAILY
COMMUNITY

## 2022-10-10 RX ORDER — CARVEDILOL 6.25 MG/1
12.5 TABLET ORAL 2 TIMES DAILY WITH MEALS
Status: DISCONTINUED | OUTPATIENT
Start: 2022-10-10 | End: 2022-10-12 | Stop reason: HOSPADM

## 2022-10-10 RX ADMIN — KETOROLAC TROMETHAMINE 10 MG: 10 TABLET, FILM COATED ORAL at 06:10

## 2022-10-10 RX ADMIN — GABAPENTIN 600 MG: 300 CAPSULE ORAL at 06:10

## 2022-10-10 RX ADMIN — SODIUM CHLORIDE, SODIUM GLUCONATE, SODIUM ACETATE, POTASSIUM CHLORIDE AND MAGNESIUM CHLORIDE: 526; 502; 368; 37; 30 INJECTION, SOLUTION INTRAVENOUS at 06:10

## 2022-10-10 RX ADMIN — Medication 200 MCG: at 06:10

## 2022-10-10 RX ADMIN — Medication 200 MCG: at 08:10

## 2022-10-10 RX ADMIN — CEFAZOLIN SODIUM 2000 MG: 2 INJECTION, SOLUTION INTRAVENOUS at 08:10

## 2022-10-10 RX ADMIN — SENNOSIDES AND DOCUSATE SODIUM 2 TABLET: 8.6; 5 TABLET ORAL at 08:10

## 2022-10-10 RX ADMIN — FAMOTIDINE 20 MG: 20 TABLET, FILM COATED ORAL at 08:10

## 2022-10-10 RX ADMIN — METHOCARBAMOL 750 MG: 750 TABLET ORAL at 02:10

## 2022-10-10 RX ADMIN — METOCLOPRAMIDE 10 MG: 5 INJECTION, SOLUTION INTRAMUSCULAR; INTRAVENOUS at 08:10

## 2022-10-10 RX ADMIN — PROPOFOL 125 MCG/KG/MIN: 10 INJECTION, EMULSION INTRAVENOUS at 06:10

## 2022-10-10 RX ADMIN — POLYETHYLENE GLYCOL 3350 17 G: 17 POWDER, FOR SOLUTION ORAL at 08:10

## 2022-10-10 RX ADMIN — SODIUM CHLORIDE 3 G: 9 INJECTION, SOLUTION INTRAVENOUS at 07:10

## 2022-10-10 RX ADMIN — KETOROLAC TROMETHAMINE 10 MG: 10 TABLET, FILM COATED ORAL at 02:10

## 2022-10-10 RX ADMIN — ACETAMINOPHEN 1000 MG: 500 TABLET ORAL at 06:10

## 2022-10-10 RX ADMIN — SODIUM CHLORIDE: 9 INJECTION, SOLUTION INTRAVENOUS at 09:10

## 2022-10-10 RX ADMIN — MIDAZOLAM 2 MG: 1 INJECTION INTRAMUSCULAR; INTRAVENOUS at 06:10

## 2022-10-10 RX ADMIN — TRAMADOL HYDROCHLORIDE 50 MG: 50 TABLET, COATED ORAL at 11:10

## 2022-10-10 RX ADMIN — GABAPENTIN 300 MG: 300 CAPSULE ORAL at 08:10

## 2022-10-10 RX ADMIN — CARVEDILOL 12.5 MG: 6.25 TABLET, FILM COATED ORAL at 04:10

## 2022-10-10 RX ADMIN — Medication 200 MCG: at 07:10

## 2022-10-10 RX ADMIN — BUPIVACAINE HYDROCHLORIDE 2 ML: 7.5 INJECTION, SOLUTION EPIDURAL; RETROBULBAR at 06:10

## 2022-10-10 RX ADMIN — LIDOCAINE HYDROCHLORIDE 50 MG: 20 INJECTION INTRAVENOUS at 06:10

## 2022-10-10 RX ADMIN — SODIUM CHLORIDE, SODIUM GLUCONATE, SODIUM ACETATE, POTASSIUM CHLORIDE AND MAGNESIUM CHLORIDE 1000 ML: 526; 502; 368; 37; 30 INJECTION, SOLUTION INTRAVENOUS at 06:10

## 2022-10-10 RX ADMIN — DEXAMETHASONE SODIUM PHOSPHATE 8 MG: 4 INJECTION, SOLUTION INTRA-ARTICULAR; INTRALESIONAL; INTRAMUSCULAR; INTRAVENOUS; SOFT TISSUE at 07:10

## 2022-10-10 RX ADMIN — TAMSULOSIN HYDROCHLORIDE 0.4 MG: 0.4 CAPSULE ORAL at 08:10

## 2022-10-10 RX ADMIN — CEFAZOLIN SODIUM 2000 MG: 2 INJECTION, SOLUTION INTRAVENOUS at 02:10

## 2022-10-10 RX ADMIN — GLYCOPYRROLATE 0.2 MG: 0.2 INJECTION INTRAMUSCULAR; INTRAVENOUS at 06:10

## 2022-10-10 RX ADMIN — TRANEXAMIC ACID 1950 MG: 650 TABLET ORAL at 06:10

## 2022-10-10 RX ADMIN — TRAMADOL HYDROCHLORIDE 50 MG: 50 TABLET, COATED ORAL at 08:10

## 2022-10-10 RX ADMIN — TRAMADOL HYDROCHLORIDE 50 MG: 50 TABLET, COATED ORAL at 04:10

## 2022-10-10 RX ADMIN — ACETAMINOPHEN 1000 MG: 10 INJECTION INTRAVENOUS at 04:10

## 2022-10-10 RX ADMIN — KETAMINE HYDROCHLORIDE 50 MG: 100 INJECTION, SOLUTION INTRAMUSCULAR; INTRAVENOUS at 07:10

## 2022-10-10 RX ADMIN — ONDANSETRON HYDROCHLORIDE 4 MG: 2 SOLUTION INTRAMUSCULAR; INTRAVENOUS at 07:10

## 2022-10-10 RX ADMIN — METOCLOPRAMIDE 10 MG: 5 INJECTION, SOLUTION INTRAMUSCULAR; INTRAVENOUS at 02:10

## 2022-10-10 NOTE — TRANSFER OF CARE
"Anesthesia Transfer of Care Note    Patient: Camden Uriarte    Procedure(s) Performed: Procedure(s) (LRB):  ROBOTIC ARTHROPLASTY, KNEE, TOTAL (Right)    Patient location: PACU    Anesthesia Type: spinal    Transport from OR: Transported from OR on room air with adequate spontaneous ventilation    Post pain: adequate analgesia    Post assessment: no apparent anesthetic complications and tolerated procedure well    Post vital signs: stable    Level of consciousness: awake and alert    Nausea/Vomiting: no nausea/vomiting    Complications: none    Transfer of care protocol was followed      Last vitals:   Visit Vitals  BP (!) 164/83   Pulse (!) 56   Temp 36.6 °C (97.9 °F) (Tympanic)   Resp 20   Ht 5' 10" (1.778 m)   Wt 131.5 kg (289 lb 14.5 oz)   SpO2 96%   BMI 41.60 kg/m²     "

## 2022-10-10 NOTE — OP NOTE
OPERATIVE REPORT    Patient: Camden Uriarte   : 1945    MRN: [id number]  Date: 10/10/2022      Surgeon: Omer Ibrahim MD  Assistant: Taqueria Gomez, PGY3, Danya Baez NP  Assistant was essential, part of the procedure including positioning, holding multiple retractors, deep hardware placement and deep closure.    Preoperative Diagnosis:  R knee primary osteoarthritis  Postoperative Diagnosis: Same  Procedure:  R JOSEFA TKA (16533)  Anesthesiologist: Dick Padilla MD  OR Staff: Circulator: Hima Gonzalez RN  Nurse Practitioner: THUY Purvis  Scrub Person: Chi Moran  Implants:   Implant Name Type Inv. Item Serial No.  Lot No. LRB No. Used Action   PIN BONE [id number] - [id number]  PIN BONE [id number]  [employer].  Right 1 Implanted and Explanted   INSERT TIBIAL SZ 7 9MMX3 - [id number]  INSERT TIBIAL SZ 7 9MMX3  [employer]. [id number] Right 1 Implanted   COMP FEMCR BEAD SZ 7 RIGHT - [id number]  COMP FEMCR BEAD SZ 7 RIGHT  [employer]. [id number] Right 1 Implanted   BASEPLATE TIBIAL TRIATHLON SZ - [id number]  BASEPLATE TIBIAL TRIATHLON SZ  [employer]. [id number] Right 1 Implanted   PATELLA TRIATHLON ASYM [id number] - [id number]  PATELLA TRIATHLON ASYM [id number]  [employer]. [id number] Right 1 Implanted     EBL: 75  Complications: None  Disposition: To PACU, stable    Indications: Camden Uriarte is a 76 y.o. male presenting with R knee pain.  Patient had tried and failed all conservative measures including injections, activity modification, NSAIDs, and home exercise program.  Risks, benefits, and alternatives discussed with regards to right total knee arthroplasty.  All questions answered to patients satisfaction, no guarantees made.  Despite these risks, the patient agreed to proceed with surgical intervention.     Procedure Note:  The patient was brought back to the OR and placed supine on the OR table. After successful induction of anesthesia by anesthesia staff, all  bony prominences were padded appropriately.  Tourniquet placed to the right upper thigh.  Right knee prepped and draped in normal sterile fashion. At this time, a time-out was performed, with the correct patient, site, and procedure identified.  Preoperative antibiotics were verified as administered.     Leg was exsanguinated using Esmarch tourniquet and tourniquet was inflated to 250 mmHg.  Standard midline parapatellar incision was performed taken through the skin and subcutaneous tissue.  A fresh knife was used to make an arthrotomy midline parapatellar approach.  Proximal medial tibia retinaculam was released from the proximal medial tibial plateau.  Small resection of prepatellar fat pad was performed.  Knee flexed up.  Schanz pins attached to the distal femur as well as distal tibia.  [employer] robotic arrays attached to both sets of pins.  Checkpoints attached to distal femur and proximal tibia.  Registration performed initially with hip center followed by ankle center.  And registration of both the distal femur and proximal tibia performed using Corbin protocol.  We then checked joint balance and ligamentous tension in both extension as well as 90° of flexion.  Adjusted femoral and tibial implants to get appropriate gaps in both extension as well as flexion.  After we were satisfied with implant position as well as volume resection, [employer] robot was brought in.  Femoral cuts performed followed by tibial cuts.  Care performed to protect all necessary soft tissue structures during bony resection.  Bony fragments and resected and removed from the knee.  Medial and lateral meniscus resected.  Tibial tray placed in the position and pinned.  Trial liner placed in position.  Trial femoral component placed in position and adjusted medially and laterally to the appropriate position.  Knee taken to full range of motion.  Came to full extension.  Excellent flexion greater than 120°.  Stable throughout his range of motion with  no extension instability or flexion instability.  Patella tracked well.    Patella was everted.  Thickness measured.  10 mm of bone was resected from the patella.  It was sized at this point as well.  Femoral lugs drilled and the femoral trial removed.  We then punched the tibia with a tibial tower.  Tibial trial then removed.  Implants opened.  Tibia component implanted, followed by the polyethylene liner.  Femoral component then implanted.  Patella was everted and lugs drilled and the patella was also implanted.  At this point brought the knee to full range of motion yet again.  Again excellent range of motion from full extension greater than 120° of flexion was noted with no instability at full flexion, mid flexion and extension.  Checkpoints removed as well as pins.    The tourniquet was dropped.  All bleeders were coagulated.  Injection of joint cocktail periarticularly.  Copious irrigation with normal saline performed along with Betadine lavage followed by more normal saline.  We then turned our attention towards closure.  Arthrotomy closed with #1 Vicryl as well as #1 Stratafix suture.  Subcutaneous tissue closed with 2-0 Monocryl.  Followed by skin closure.    Patient arose from anesthesia without any issue and was then subsequently transferred to to PACU in a stable condition.    All sponge and needle counts were correct at the end of the case.  I was present and participated in all aspects of the procedure.    Prognosis:  The patient will be weight-bearing as tolerated to the right lower extremity with range of motion with physical therapy.  Patient will receive DVT prophylaxis .   plan discharge home versus a facility once the patient is stable with physical therapy guidelines.      This note/OR report was created with the assistance of  voice recognition software or phone  dictation.  There may be transcription errors as a result of using this technology however minimal. Effort has been made to assure  accuracy of transcription but any obvious errors or omissions should be clarified with the author of the document.

## 2022-10-10 NOTE — PLAN OF CARE
Problem: Adult Inpatient Plan of Care  Goal: Plan of Care Review  Outcome: Ongoing, Progressing  Goal: Patient-Specific Goal (Individualized)  Outcome: Ongoing, Progressing  Goal: Absence of Hospital-Acquired Illness or Injury  Outcome: Ongoing, Progressing  Goal: Optimal Comfort and Wellbeing  Outcome: Ongoing, Progressing  Goal: Readiness for Transition of Care  Outcome: Ongoing, Progressing     Problem: Bariatric Environmental Safety  Goal: Safety Maintained with Care  Outcome: Ongoing, Progressing     Problem: COPD (Chronic Obstructive Pulmonary Disease) Comorbidity  Goal: Maintenance of COPD Symptom Control  Outcome: Ongoing, Progressing     Problem: Hypertension Comorbidity  Goal: Blood Pressure in Desired Range  Outcome: Ongoing, Progressing     Problem: Obstructive Sleep Apnea Risk or Actual Comorbidity Management  Goal: Unobstructed Breathing During Sleep  Outcome: Ongoing, Progressing     Problem: Pain Acute  Goal: Acceptable Pain Control and Functional Ability  Outcome: Ongoing, Progressing     Problem: Fall Injury Risk  Goal: Absence of Fall and Fall-Related Injury  Outcome: Ongoing, Progressing     Problem: Adjustment to Surgery (Knee Arthroplasty)  Goal: Optimal Coping  Outcome: Ongoing, Progressing     Problem: Bleeding (Knee Arthroplasty)  Goal: Absence of Bleeding  Outcome: Ongoing, Progressing     Problem: Bowel Motility Impaired (Knee Arthroplasty)  Goal: Effective Bowel Elimination  Outcome: Ongoing, Progressing     Problem: Fluid and Electrolyte Imbalance (Knee Arthroplasty)  Goal: Fluid and Electrolyte Balance  Outcome: Ongoing, Progressing     Problem: Functional Ability Impaired (Knee Arthroplasty)  Goal: Optimal Functional Ability  Outcome: Ongoing, Progressing     Problem: Infection (Knee Arthroplasty)  Goal: Absence of Infection Signs and Symptoms  Outcome: Ongoing, Progressing     Problem: Neurovascular Compromise (Knee Arthroplasty)  Goal: Intact Neurovascular Status  Outcome: Ongoing,  Progressing     Problem: Ongoing Anesthesia Effects (Knee Arthroplasty)  Goal: Anesthesia/Sedation Recovery  Outcome: Ongoing, Progressing     Problem: Pain (Knee Arthroplasty)  Goal: Acceptable Pain Control  Outcome: Ongoing, Progressing     Problem: Postoperative Nausea and Vomiting (Knee Arthroplasty)  Goal: Nausea and Vomiting Relief  Outcome: Ongoing, Progressing     Problem: Postoperative Urinary Retention (Knee Arthroplasty)  Goal: Effective Urinary Elimination  Outcome: Ongoing, Progressing     Problem: Respiratory Compromise (Knee Arthroplasty)  Goal: Effective Oxygenation and Ventilation  Outcome: Ongoing, Progressing     Problem: Infection  Goal: Absence of Infection Signs and Symptoms  Outcome: Ongoing, Progressing

## 2022-10-10 NOTE — PLAN OF CARE
S/p TKR. Spk w pt & wife, Steph-- wife to asst w homecare. Pt needs RW. Provider list given. Foc obtained.   Called/ faxed referral for dme to [facility]. They will deliver to hospital.   Called/ faxed referral for outpatient therapy to [facility]. They will contact pt with appointment date & time.   PCP: Dr. Renata Berg  Rx: super 1 ( N.I.)     CONTACT# Steph [phone].

## 2022-10-10 NOTE — PLAN OF CARE
Problem: Physical Therapy  Goal: Physical Therapy Goal  Description: Pt will improve functional independence by performing:    Bed mobility: SBA  Sit to stand: SBA  Car Transfer: Min A  with rolling walker  Ambulation x 200'  feet with SBA and rolling walker  1 Step (Curb): Min A  and rolling walker  3 Steps: Min A  and B HR  right knee AROM flexion (in degrees): 70  right knee AROM extension (in degrees): 0   Independent with total knee HEP      Outcome: Ongoing, Progressing

## 2022-10-10 NOTE — PT/OT/SLP EVAL
"Physical Therapy Evaluation    Patient Name:  Camden Uriarte   MRN:  [id number]    Recommendations:     Discharge Recommendations:  home, outpatient PT   Discharge Equipment Recommendations: walker, rolling   Barriers to discharge: None    Assessment:     Camden Uriarte is a 76 y.o. male admitted with a medical diagnosis of Primary osteoarthritis of right knee.  He presents with the following impairments/functional limitations:  weakness, impaired endurance, impaired functional mobility, gait instability, impaired balance, decreased lower extremity function, pain, decreased safety awareness, decreased ROM, edema, orthopedic precautions.    Rehab Prognosis: Good; patient would benefit from acute skilled PT services to address these deficits and reach maximum level of function.    Recent Surgery: Procedure(s) (LRB):  ROBOTIC ARTHROPLASTY, KNEE, TOTAL (Right) Day of Surgery    Plan:     During this hospitalization, patient to be seen BID to address the identified rehab impairments via gait training, therapeutic activities, therapeutic exercises and progress toward the following goals:    Plan of Care Expires:  10/14/22    Subjective     Chief Complaint: lesvia knee pain, R>L prior to sx  Patient/Family Comments/goals: "To cut my grass and go fishing again"  Pain/Comfort:  Location - Side 1: Right  Location 1: knee  Pain Addressed 1: Pre-medicate for activity, Reposition, Cessation of Activity, Distraction    Patients cultural, spiritual, Jehovah's witness conflicts given the current situation: no    Living Environment:  Pt lives with his wife in a 2 story home with a threshold to enter. The patient reports he does not need to access the 2nd story. The patient reports having 2 falls in the last 3 months that were caused by his knees giving out on him. He plans to get the other knee replaced soon.  Prior to admission, patients level of function was mod I - pt ambulated with a SPC prior to sx.  Equipment used at home: cane, straight. "  DME owned (not currently used): none - this patient will need a RW. Upon discharge, patient will have assistance from his wife.  Prehab: Pt reports participating in pre-therapy and exercise prior to sx.  Objective:     Communicated with ABRAHAN Landaverde prior to session.  Patient found HOB elevated with Condom Catheter, peripheral IV, pulse ox (continuous), telemetry, blood pressure cuff  upon PT entry to room.    General Precautions: Standard,     Orthopedic Precautions:RLE weight bearing as tolerated   Braces: N/A  Respiratory Status: Nasal cannula, however this was taken off for eval as pt reports he does not have supplemental O2 at home.    Exams:  RLE ROM:      (In degrees) AROM PROM   R knee flexion 68 78   R knee extension 0      RLE Strength: NT d/t surgical side  LLE ROM: WFL  LLE Strength: WFL    Functional Mobility:  Bed Mobility:     Supine to Sit: stand by assistance  Transfers:     Sit to Stand:  contact guard assistance with rolling walker  Gait: 200ft with RW, CGA, several small LOB that pt was able to self correct, likely caused by L knee instability. Pt required VC to stay within RW and in closer proximity.        Patient left up in chair with all lines intact, call button in reach, ABRAHAN Landaverde notified, and wife present.    GOALS:   Multidisciplinary Problems       Physical Therapy Goals          Problem: Physical Therapy    Goal Priority Disciplines Outcome Goal Variances Interventions   Physical Therapy Goal     PT, PT/OT Ongoing, Progressing     Description: Pt will improve functional independence by performing:    Bed mobility: SBA  Sit to stand: SBA  Car Transfer: Min A  with rolling walker  Ambulation x 200'  feet with SBA and rolling walker  1 Step (Curb): Min A  and rolling walker  3 Steps: Min A  and B HR  right knee AROM flexion (in degrees): 70  right knee AROM extension (in degrees): 0   Independent with total knee HEP                           History:     Past Medical History:   Diagnosis Date     Arthritis     COPD (chronic obstructive pulmonary disease)     Elevated cholesterol     Hypertension     Sleep apnea        Past Surgical History:   Procedure Laterality Date    gastric sleeve      kidney stones      TOTAL SHOULDER ARTHROPLASTY Right 2018    Dr. Ceja       Time Tracking:     PT Received On:    PT Start Time: 1200     PT Stop Time: 1224  PT Total Time (min): 24 min     Billable Minutes: Evaluation 24 min Moderate      10/10/2022

## 2022-10-10 NOTE — ANESTHESIA PREPROCEDURE EVALUATION
10/09/2022  Camden Uriarte is a 76 y.o., male who presents with pain of his Right knee due to Severe Osteoarthritis of the Right Knee.. All conservative measures have failed to relieve his knee pain.      He comes to [facility] for the noted procedure under SAB and IV Propofol infusion.      PMHx:  No specialty history recorded    Other Medical History   Hypertension COPD (chronic obstructive pulmonary disease)   Elevated cholesterol Arthritis   Sleep apnea      PSHx/Surgical History:  TOTAL SHOULDER ARTHROPLASTY gastric sleeve   kidney stones        Lab Data:    EKG:      Vital signs:      Pre-op Assessment    I have reviewed the Patient Summary Reports.     I have reviewed the Nursing Notes. I have reviewed the NPO Status.   I have reviewed the Medications.     Review of Systems  Anesthesia Hx:  No problems with previous Anesthesia    Social:  Non-Smoker    Hematology/Oncology:  Hematology Normal   Oncology Normal     EENT/Dental:EENT/Dental Normal   Cardiovascular:   Exercise tolerance: good Hypertension  Functional Capacity good / => 4 METS    Pulmonary:   COPD Sleep Apnea    Renal/:  Renal/ Normal     Hepatic/GI:  Hepatic/GI Normal    Musculoskeletal:  Musculoskeletal Normal    Neurological:  Neurology Normal    Endocrine:  Endocrine Normal    Dermatological:  Skin Normal    Psych:  Psychiatric Normal           Physical Exam  General: Alert, Oriented, Well nourished and Cooperative    Airway:  Mallampati: II   Mouth Opening: Normal  TM Distance: Normal  Tongue: Normal  Neck ROM: Normal ROM    Dental:  Intact    Chest/Lungs:  Clear to auscultation, Normal Respiratory Rate    Heart:  Rate: Normal  Rhythm: Regular Rhythm        Anesthesia Plan  Type of Anesthesia, risks & benefits discussed:    Anesthesia Type: Spinal, Gen Natural Airway  Intra-op Monitoring Plan: Standard ASA Monitors  Post Op Pain Control  Plan: IV/PO Opioids PRN and intrathecal opioid  Induction:  IV  Informed Consent: Informed consent signed with the Patient and all parties understand the risks and agree with anesthesia plan.  All questions answered. Patient consented to blood products? Yes  ASA Score: 2  Day of Surgery Review of History & Physical: H&P Update referred to the surgeon/provider.    Ready For Surgery From Anesthesia Perspective.     .

## 2022-10-10 NOTE — ANESTHESIA PROCEDURE NOTES
Spinal    Diagnosis: Severe Osteoarthritis  Patient location during procedure: OR  Start time: 10/10/2022 6:48 AM  Timeout: 10/10/2022 6:40 AM  End time: 10/10/2022 6:53 AM    Staffing  Authorizing Provider: Dick Padilla MD  Performing Provider: Dick Padilla MD    Preanesthetic Checklist  Completed: patient identified, IV checked, site marked, risks and benefits discussed, surgical consent, monitors and equipment checked, pre-op evaluation and timeout performed  Spinal Block  Patient position: sitting  Prep: ChloraPrep  Patient monitoring: heart rate, cardiac monitor, continuous pulse ox, continuous capnometry and frequent blood pressure checks  Approach: midline  Location: L2-3  Injection technique: single shot  CSF Fluid: clear free-flowing CSF  Needle  Needle type: Andre   Needle gauge: 25 G  Needle length: 3.5 in  Additional Documentation: incremental injection and no paresthesia on injection  Needle localization: anatomical landmarks  Assessment  Sensory level: T6   Dermatomal levels determined by pinch or prick  Ease of block: easy  Patient's tolerance of the procedure: comfortable throughout block and no complaints  Additional Notes  After SA sp. Id'ed,free flow of CSF, initially some difficulty w/ Aspiration of CSF was noted.   Spinal Needle repositioned x's 2, free flow of CSF and easy aspiration of CSF.  Injection as noted above.Pt. Tolerated procedure well. Returned to supine for Positioning, prep and drape.  Medications:    Medications: bupivacaine (pf) (MARCAINE) injection 0.75% - Intraspinal   2 mL - 10/10/2022 6:48:00 AM

## 2022-10-11 LAB
ANION GAP SERPL CALC-SCNC: 9 MEQ/L
BUN SERPL-MCNC: 21.4 MG/DL (ref 8.4–25.7)
CALCIUM SERPL-MCNC: 8.5 MG/DL (ref 8.8–10)
CHLORIDE SERPL-SCNC: 111 MMOL/L (ref 98–107)
CO2 SERPL-SCNC: 25 MMOL/L (ref 23–31)
CREAT SERPL-MCNC: 0.86 MG/DL (ref 0.73–1.18)
CREAT/UREA NIT SERPL: 25
ERYTHROCYTE [DISTWIDTH] IN BLOOD BY AUTOMATED COUNT: 13.2 % (ref 11.5–17)
GFR SERPLBLD CREATININE-BSD FMLA CKD-EPI: >60 MLS/MIN/1.73/M2
GLUCOSE SERPL-MCNC: 155 MG/DL (ref 82–115)
HCT VFR BLD AUTO: 32.8 % (ref 42–52)
HGB BLD-MCNC: 11.7 GM/DL (ref 14–18)
MCH RBC QN AUTO: 29.9 PG (ref 27–31)
MCHC RBC AUTO-ENTMCNC: 35.7 MG/DL (ref 33–36)
MCV RBC AUTO: 83.9 FL (ref 80–94)
NRBC BLD AUTO-RTO: 0 %
PLATELET # BLD AUTO: 145 X10(3)/MCL (ref 130–400)
PMV BLD AUTO: 10.5 FL (ref 7.4–10.4)
POTASSIUM SERPL-SCNC: 3.8 MMOL/L (ref 3.5–5.1)
RBC # BLD AUTO: 3.91 X10(6)/MCL (ref 4.7–6.1)
SODIUM SERPL-SCNC: 145 MMOL/L (ref 136–145)
WBC # SPEC AUTO: 15 X10(3)/MCL (ref 4.5–11.5)

## 2022-10-11 PROCEDURE — 36415 COLL VENOUS BLD VENIPUNCTURE: CPT | Performed by: ORTHOPAEDIC SURGERY

## 2022-10-11 PROCEDURE — 25000003 PHARM REV CODE 250: Performed by: NURSE PRACTITIONER

## 2022-10-11 PROCEDURE — 94799 UNLISTED PULMONARY SVC/PX: CPT

## 2022-10-11 PROCEDURE — 97110 THERAPEUTIC EXERCISES: CPT

## 2022-10-11 PROCEDURE — 97530 THERAPEUTIC ACTIVITIES: CPT

## 2022-10-11 PROCEDURE — 63600175 PHARM REV CODE 636 W HCPCS: Performed by: ORTHOPAEDIC SURGERY

## 2022-10-11 PROCEDURE — 25000003 PHARM REV CODE 250: Performed by: ORTHOPAEDIC SURGERY

## 2022-10-11 PROCEDURE — 94761 N-INVAS EAR/PLS OXIMETRY MLT: CPT

## 2022-10-11 PROCEDURE — 85027 COMPLETE CBC AUTOMATED: CPT | Performed by: ORTHOPAEDIC SURGERY

## 2022-10-11 PROCEDURE — 80048 BASIC METABOLIC PNL TOTAL CA: CPT | Performed by: ORTHOPAEDIC SURGERY

## 2022-10-11 PROCEDURE — 11000001 HC ACUTE MED/SURG PRIVATE ROOM

## 2022-10-11 RX ORDER — ACETAMINOPHEN 500 MG
500 TABLET ORAL
Status: DISCONTINUED | OUTPATIENT
Start: 2022-10-11 | End: 2022-10-12 | Stop reason: HOSPADM

## 2022-10-11 RX ORDER — CALCIUM CARBONATE 200(500)MG
500 TABLET,CHEWABLE ORAL
Status: COMPLETED | OUTPATIENT
Start: 2022-10-11 | End: 2022-10-11

## 2022-10-11 RX ORDER — ACETAMINOPHEN 500 MG
500 TABLET ORAL
Status: DISCONTINUED | OUTPATIENT
Start: 2022-10-11 | End: 2022-10-11

## 2022-10-11 RX ADMIN — FUROSEMIDE 20 MG: 20 TABLET ORAL at 09:10

## 2022-10-11 RX ADMIN — KETOROLAC TROMETHAMINE 10 MG: 10 TABLET, FILM COATED ORAL at 12:10

## 2022-10-11 RX ADMIN — CALCIUM CARBONATE (ANTACID) CHEW TAB 500 MG 500 MG: 500 CHEW TAB at 11:10

## 2022-10-11 RX ADMIN — DOCUSATE SODIUM 200 MG: 100 CAPSULE, LIQUID FILLED ORAL at 05:10

## 2022-10-11 RX ADMIN — KETOROLAC TROMETHAMINE 10 MG: 10 TABLET, FILM COATED ORAL at 11:10

## 2022-10-11 RX ADMIN — METOCLOPRAMIDE 10 MG: 5 INJECTION, SOLUTION INTRAMUSCULAR; INTRAVENOUS at 01:10

## 2022-10-11 RX ADMIN — POLYETHYLENE GLYCOL 3350 17 G: 17 POWDER, FOR SOLUTION ORAL at 09:10

## 2022-10-11 RX ADMIN — CARVEDILOL 12.5 MG: 6.25 TABLET, FILM COATED ORAL at 09:10

## 2022-10-11 RX ADMIN — FAMOTIDINE 20 MG: 20 TABLET, FILM COATED ORAL at 09:10

## 2022-10-11 RX ADMIN — ACETAMINOPHEN 500 MG: 500 TABLET ORAL at 11:10

## 2022-10-11 RX ADMIN — SENNOSIDES AND DOCUSATE SODIUM 2 TABLET: 8.6; 5 TABLET ORAL at 09:10

## 2022-10-11 RX ADMIN — ALLOPURINOL 300 MG: 300 TABLET ORAL at 09:10

## 2022-10-11 RX ADMIN — HYDROCODONE BITARTRATE AND ACETAMINOPHEN 1 TABLET: 5; 325 TABLET ORAL at 01:10

## 2022-10-11 RX ADMIN — CARVEDILOL 12.5 MG: 6.25 TABLET, FILM COATED ORAL at 04:10

## 2022-10-11 RX ADMIN — KETOROLAC TROMETHAMINE 10 MG: 10 TABLET, FILM COATED ORAL at 06:10

## 2022-10-11 RX ADMIN — CEFAZOLIN SODIUM 2000 MG: 2 INJECTION, SOLUTION INTRAVENOUS at 01:10

## 2022-10-11 RX ADMIN — TRAMADOL HYDROCHLORIDE 50 MG: 50 TABLET, COATED ORAL at 09:10

## 2022-10-11 RX ADMIN — ACETAMINOPHEN 500 MG: 500 TABLET ORAL at 09:10

## 2022-10-11 RX ADMIN — CALCIUM CARBONATE (ANTACID) CHEW TAB 500 MG 500 MG: 500 CHEW TAB at 04:10

## 2022-10-11 RX ADMIN — TRAMADOL HYDROCHLORIDE 50 MG: 50 TABLET, COATED ORAL at 12:10

## 2022-10-11 RX ADMIN — CALCIUM CARBONATE (ANTACID) CHEW TAB 500 MG 500 MG: 500 CHEW TAB at 09:10

## 2022-10-11 RX ADMIN — ASPIRIN 81 MG 81 MG: 81 TABLET ORAL at 09:10

## 2022-10-11 RX ADMIN — TRAMADOL HYDROCHLORIDE 50 MG: 50 TABLET, COATED ORAL at 05:10

## 2022-10-11 RX ADMIN — SERTRALINE HYDROCHLORIDE 100 MG: 50 TABLET ORAL at 09:10

## 2022-10-11 RX ADMIN — TRAMADOL HYDROCHLORIDE 50 MG: 50 TABLET, COATED ORAL at 06:10

## 2022-10-11 RX ADMIN — KETOROLAC TROMETHAMINE 10 MG: 10 TABLET, FILM COATED ORAL at 05:10

## 2022-10-11 RX ADMIN — AMLODIPINE AND BENAZEPRIL HYDROCHLORIDE 1 CAPSULE: 5; 20 CAPSULE ORAL at 09:10

## 2022-10-11 RX ADMIN — TAMSULOSIN HYDROCHLORIDE 0.4 MG: 0.4 CAPSULE ORAL at 09:10

## 2022-10-11 RX ADMIN — METOCLOPRAMIDE 10 MG: 5 INJECTION, SOLUTION INTRAMUSCULAR; INTRAVENOUS at 09:10

## 2022-10-11 RX ADMIN — ACETAMINOPHEN 500 MG: 500 TABLET ORAL at 04:10

## 2022-10-11 NOTE — PT/OT/SLP PROGRESS
"Physical Therapy Treatment    Patient Name:  Camden Uriarte   MRN:  [id number]    Recommendations:     Discharge Recommendations:  home, outpatient PT   Discharge Equipment Recommendations: walker, rolling   Barriers to discharge: None    Assessment:     Camden Uriarte is a 76 y.o. male admitted with a medical diagnosis of Primary osteoarthritis of right knee.  He presents with the following impairments/functional limitations:  weakness, impaired functional mobility, gait instability, impaired balance, decreased lower extremity function, pain, decreased ROM, edema, orthopedic precautions.    Rehab Prognosis: Good; patient would benefit from acute skilled PT services to address these deficits and reach maximum level of function.    Recent Surgery: Procedure(s) (LRB):  ROBOTIC ARTHROPLASTY, KNEE, TOTAL (Right) 1 Day Post-Op    Plan:     During this hospitalization, patient to be seen BID to address the identified rehab impairments via gait training, therapeutic activities, therapeutic exercises and progress toward the following goals:    Plan of Care Expires:  10/14/22    Subjective     Chief Complaint: R knee pain  Patient/Family Comments/goals: "To do what I need to do"  Pain/Comfort:  Location - Side 1: Right  Location 1: knee  Pain Addressed 1: Reposition, Distraction, Cessation of Activity, Nurse notified (NSG administered medication during session)      Objective:     Communicated with ABRAHAN Dasilva prior to session.  Patient found sitting edge of bed with wife present upon PT entry to room.     General Precautions: Standard,     Orthopedic Precautions:RLE weight bearing as tolerated   Braces:    Respiratory Status: Room air     Functional Mobility:  Bed Mobility:     Sit to Supine: stand by assistance  Transfers:     Sit to Stand:  stand by assistance with rolling walker  Car Transfer: minimum assistance with  rolling walker  using  Step Transfer - Min A to stand up from low car height. Pt did have some breakthrough " "bleeding after car transfer; notified ABRAHAN Dasilva.   Gait: 200ft with RW, SBA/CGA, VC to keep RW in closer proximity, pt reported feeling dizzy so CGA was provided for safety, pt refused to sit.  Stairs:  Pt ascended/descended 3 stair(s) with No Assistive Device with bilateral handrails with Stand-by Assistance.   Curb: Pt ascended/descended 4" curb with RW with SBA, VC for sequencing.       (In degrees) AROM PROM   R knee flexion 70 78   R knee extension 0        Therapeutic Activities and Exercises:   TKA HEP 1x10, pt demonstrated good understanding and form on all exercises.     Patient left supine with call button in reach, ABRAHAN Dasilva notified, and wife, Steph, present..    GOALS:   Multidisciplinary Problems       Physical Therapy Goals       Not on file              Multidisciplinary Problems (Resolved)          Problem: Physical Therapy    Goal Priority Disciplines Outcome Goal Variances Interventions   Physical Therapy Goal   (Resolved)     PT, PT/OT Met     Description: Pt will improve functional independence by performing:    Bed mobility: SBA  Sit to stand: SBA  Car Transfer: Min A  with rolling walker  Ambulation x 200'  feet with SBA and rolling walker  1 Step (Curb): Min A  and rolling walker  3 Steps: Min A  and B HR  right knee AROM flexion (in degrees): 70  right knee AROM extension (in degrees): 0   Independent with total knee HEP      All of the above goals MET 10/11/22                        Time Tracking:     PT Received On:    PT Start Time: 0904     PT Stop Time: 0935  PT Total Time (min): 31 min     Billable Minutes: Therapeutic Activity 20 and Therapeutic Exercise 11    Treatment Type: Treatment  PT/PTA: PT           10/11/2022  "

## 2022-10-11 NOTE — PROGRESS NOTES
"No acute events overnight.  Pain controlled.  Resting in bed.     Vital Signs  Temp: 98.4 °F (36.9 °C)  Temp src: Oral  Pulse: 65  Heart Rate Source: Monitor  Resp: 18  SpO2: 96 %  Pulse Oximetry Type: Intermittent  Flow (L/min): 2  Oxygen Concentration (%): 80  O2 Device (Oxygen Therapy): room air  BP: 138/76  BP Location: Right arm  BP Method: Automatic  Patient Position: Lying  Height and Weight  Height: 5' 10" (177.8 cm)  Height Method: Stated  Weight: 131.5 kg (289 lb 14.5 oz)  Weight Method: Bed Scale  BSA (Calculated - sq m): 2.55 sq meters  BMI (Calculated): 41.6  Weight in (lb) to have BMI = 25: 173.9]    +FHL/EHL  BCR distally  Dressing c/d/i  SILT distally    Recent Lab Results         10/11/22  0516   10/10/22  0851        Anion Gap 9.0         BUN 21.4         BUN/CREAT RATIO 25         Calcium 8.5         Chloride 111         CO2 25         Creatinine 0.86         eGFR >60         Glucose 155         Hematocrit 32.8   39.7       Hemoglobin 11.7   13.2       MCH 29.9         MCHC 35.7         MCV 83.9         MPV 10.5         nRBC 0.0         Platelets 145         Potassium 3.8         RBC 3.91         RDW 13.2         Sodium 145         WBC 15.0                 A/P:  Status post TKA  Pain controlled  Overall patient doing well.  Therapy for mobility and ambulation.  ASA for DVT PPx  Home later today vs tomorrow  "

## 2022-10-11 NOTE — ANESTHESIA POSTPROCEDURE EVALUATION
Anesthesia Post Evaluation    Patient: Camden Uriarte    Procedure(s) Performed: Procedure(s) (LRB):  ROBOTIC ARTHROPLASTY, KNEE, TOTAL (Right)    Final Anesthesia Type: spinal      Patient location during evaluation: floor  Patient participation: Yes- Able to Participate  Level of consciousness: awake and alert and oriented  Post-procedure vital signs: reviewed and stable  Pain management: adequate  Airway patency: patent    PONV status at discharge: No PONV, nausea (controlled) and vomiting (controlled)  Anesthetic complications: no      Cardiovascular status: blood pressure returned to baseline and stable  Respiratory status: unassisted    Comments: SENSORI-MOTOR INTACT          Vitals Value Taken Time   /77 10/10/22 1942   Temp 36.5 °C (97.7 °F) 10/10/22 1942   Pulse 67 10/10/22 1942   Resp 16 10/10/22 1942   SpO2 95 % 10/10/22 1942         Event Time   Out of Recovery 09:18:00         Pain/Lyela Score: Pain Rating Prior to Med Admin: 2 (10/10/2022  4:33 PM)  Pain Rating Post Med Admin: 0 (10/10/2022  4:48 PM)  Leyla Score: 8 (10/10/2022  8:54 AM)

## 2022-10-11 NOTE — PLAN OF CARE
Problem: Physical Therapy  Goal: Physical Therapy Goal  Description: Pt will improve functional independence by performing:    Bed mobility: SBA  Sit to stand: SBA  Car Transfer: Min A  with rolling walker  Ambulation x 200'  feet with SBA and rolling walker  1 Step (Curb): Min A  and rolling walker  3 Steps: Min A  and B HR  right knee AROM flexion (in degrees): 70  right knee AROM extension (in degrees): 0   Independent with total knee HEP      All of the above goals MET 10/11/22   Outcome: Met

## 2022-10-11 NOTE — PT/OT/SLP PROGRESS
"Physical Therapy Treatment    Patient Name:  Camden Uriarte   MRN:  [id number]    Recommendations:     Discharge Recommendations:  home, outpatient PT   Discharge Equipment Recommendations: walker, rolling   Barriers to discharge: None    Assessment:     Camden Uriarte is a 76 y.o. male admitted with a medical diagnosis of Primary osteoarthritis of right knee.  He presents with the following impairments/functional limitations:  weakness, impaired functional mobility, gait instability, impaired balance, decreased lower extremity function, pain, decreased ROM, edema, orthopedic precautions.    Rehab Prognosis: Good; patient would benefit from acute skilled PT services to address these deficits and reach maximum level of function.    Recent Surgery: Procedure(s) (LRB):  ROBOTIC ARTHROPLASTY, KNEE, TOTAL (Right) 1 Day Post-Op    Plan:     During this hospitalization, patient to be seen BID to address the identified rehab impairments via gait training, therapeutic activities, therapeutic exercises and progress toward the following goals:    Plan of Care Expires:  10/14/22    Subjective     Chief Complaint: "I feel very [place] right now"  Patient/Family Comments/goals: "To go home tomorrow"  Pain/Comfort:  Location - Side 1: Right  Location 1: knee  Pain Addressed 1: Pre-medicate for activity, Reposition, Cessation of Activity, Distraction, Nurse notified      Objective:     Communicated with ABRAHAN Dasilva prior to session.  Patient found supine with peripheral IV upon PT entry to room.   Attempted to see pt at 1317, however pt reported that he was in too much pain and wanted to wait until after his pain medicine was given at 1330. Notified ABRAHAN Dasilva    General Precautions: Standard,     Orthopedic Precautions:RLE weight bearing as tolerated   Braces:    Respiratory Status: Room air     Functional Mobility:  Bed Mobility:     Supine to Sit: stand by assistance  Transfers:     Sit to Stand:  contact guard assistance with rolling " walker  Gait: 150ft with RW, CGA for safety d/t pt feeling sleepy, no LOB noted.         Therapeutic Activities and Exercises:   TKA HEP 1x10 RLE    Patient left up in chair with all lines intact, call button in reach, NSG notified, and wife present..    GOALS:   Multidisciplinary Problems       Physical Therapy Goals       Not on file              Multidisciplinary Problems (Resolved)          Problem: Physical Therapy    Goal Priority Disciplines Outcome Goal Variances Interventions   Physical Therapy Goal   (Resolved)     PT, PT/OT Met     Description: Pt will improve functional independence by performing:    Bed mobility: SBA  Sit to stand: SBA  Car Transfer: Min A  with rolling walker  Ambulation x 200'  feet with SBA and rolling walker  1 Step (Curb): Min A  and rolling walker  3 Steps: Min A  and B HR  right knee AROM flexion (in degrees): 70  right knee AROM extension (in degrees): 0   Independent with total knee HEP      All of the above goals MET 10/11/22                        Time Tracking:     PT Received On:    PT Start Time: 1507     PT Stop Time: 1530  PT Total Time (min): 23 min     Billable Minutes: Therapeutic Activity 10 and Therapeutic Exercise 13    Treatment Type: Treatment  PT/PTA: PT           10/11/2022

## 2022-10-11 NOTE — DISCHARGE INSTRUCTIONS
[facility]  [facility] [address]  [place] [address]  Phone [phone]       /      Fax [phone]  SURGEON: Dr. Ibrahim    After discharge, all questions or concerns should be handled at your surgeon's office ([phone]). If it is a weekend or after hours, you will get the surgeon on call.     Discharge Medications:    PAIN MANAGEMENT: Next Dose Available           Tylenol/Acetaminophen 500mg- every 4 hours, around the clock (WHILE AWAKE)   4pm on 10/12/22   Robaxin/Methocarbamol 500mg (Muscle Relaxer) - Every 6-8 hours AS NEEDED for muscle spasms, thigh pain or additional pain control Start at anytime       Ultram/Tramadol 50mg (Pain Med) - every 4-6 hours AS NEEDED for pain At 1:30pm today if needed                   COMPLICATION PREVENTION MEDS: Next Dose DUE   Aspirin 81mg twice a day for 6 weeks post-op for blood clot prevention PM on 10/12/22   MiraLAX 17gm - once or twice a day while on narcotics and muscle relaxers for constipation prevention PM on 10/12/22       Lactulose 30ml for constipation TAKE WHEN YOU GET HOME     Fleets Suppository x 2 for constipation TAKE IF NOT BOWEL MOVEMENT BY TOMORROW MORNING.           Total Knee Replacement      PAIN MEDICATIONS/PAIN MANAGEMENT:   Tylenol/Acetaminophen 500mg every 4 hours, around the clock (WHILE AWAKE). Take Ultram (Tramadol) 50mg (pain pill) every 4-6 hours as needed for pain.    **NO MORE THAN 3000mg OF TYLENOL IN 24 HOURS**.     Robaxin/Methocarbamol 500mg (muscle relaxer)- you can take every 6-8 hours as needed for muscle spasms, thigh pain and stiffness, additional pain control or breakthrough pain medications. This medication is helpful for pain control while lessening your need for narcotics. Please reduce the use gradually as the pain and spasms lessen. DO NOT TAKE AT THE SAME TIME AS A PAIN PILL. YOU WILL BE BETTER SERVED WITH 2 HOURS BETWEEN PAIN PILL AND MUSCLE RELAXER.     Use the medication log in your  discharge packet to keep track of your medications.      **Other things that help with pain control is WALKING, COMPRESSION WRAP, ICE and ELEVATION!!**    BLOOD CLOT PREVENTION:   Aspirin 81 mg twice a day for 6 weeks postop. Start on 10/11/22. Stop on 11/22/22.  If you were taking a baby aspirin prior to surgery, okay to restart after 6 weeks - 11/23/22.  You need to continuing wearing your compression stocking (GERSON Hose - ThromboEmbolic Disease Prevention Device) for the next 2-6 weeks post-op. It is ok to remove them for hygiene and at bedtime.   Hand wash and Dry. **If the swelling persists in the legs after you stop wearing the Gerson hose, continue to wear them until the swelling decreases.**  REMOVE STOCKINGS AT LEAST DAILY FOR SKIN ASSESSMENT.   Do NOT let the stockings roll down, creating a tourniquet around the back of your knee. If you need to, leave the excess at the bottom of the stocking.   The best thing you can do to prevent blood clots is to walk around as much as possible, AT LEAST EVERY 1-2 HOURS.       CONSTIPATION PREVENTION:   Miralax or Senokot S/Yandy-Colace and Stool softeners EVERY DAY while on pain meds.  Use other more aggressive over the counter LAXATIVES as needed for constipation (Examples: Milk of Magnesia, Dulcolax tabs or suppository, Magnesium Citrate, Fleet's Enema...etc.)   Drink lots of water.  Increase Fiber in diet.  Increase walking distance each day  DO NOT GO TOO LONG WITHOUT HAVING A BOWEL MOVEMENT!      ACTIVITY:  You will be FULL weight-bearing on your operative leg.  Please do not take yourself off of the walker too soon.  Please allow your outpatient therapist or surgeon to guide you.  You will be range of motion as tolerated to your operative knee.  Work on bending and straightening the knee and change positions often throughout the day.  Do not put anything behind the knee, keeping it in a bent position.  Elevate your affected extremity way above the level of the heart  to reduce swelling 2 to 3 times a day, 20-30 minutes at a time.  Walk around at least every 1-2 hours while awake.  No heavy lifting, pulling, pushing or straining.  Full Range of Motion to the Knee - working on bending and straightening     THERAPY  Outpatient Physical Therapy-bring your prescription to the clinic of your choice as soon as possible.    WOUND CARE:   Dry dressing changes every other day and as needed for soiling for 14 days. Start Friday. While the wound is draining, you may need to change the dressing daily. Switch to every other day as soon as the wound drys up. NOTIFY DR. BUCIO ON MONDAY IF THE WOUND IS STILL DRAINING.  DO NOT WET WOUND or apply any ointments, creams, lotions or antiseptics.  Ace wrap - apply your compression stocking and apply the ace wrap where the stocking stops for extra added compression to the knee.   May wet incision after you follow-up with your surgeon.   Ok to shower before then if able to keep wound from getting wet (plastic barrier, saran wrap or cling wrap and tape).   DO NOT TOUCH INCISION  Apply Ice to the Knee and thigh as much as possible.      URINARY RETENTION:  If you start having difficulty urinating, decrease the use of Pain pills and muscle relaxers and notify your primary care doctor.     PNEUMONIA PREVENTION:  Stay out of bed as much as possible and walk around every 1-2 hours.  Continue breathing exercises (Incentive Spirometry) every 1-2 hours while mobility is limited and while you are on pain pills.    INFECTION PREVENTION:  Proper handwashing before and after dressing changes. Do not wet the wound. Wound care instructions as written above. NOTIFY MD OF EXCESSIVE WOUND DRAINAGE.  No alcohol, smoking or tobacco products  Pets should not be allowed around the wound or the dressing.   Treat UTI and skin infections as soon as possible.  Pre-medicate with antibiotics prior to dental or surgical procedures.   If you are diabetic, MAINTAIN GOOD BLOOD  SUGAR CONTROL (Below 150) DURING YOUR RECOVERY. If you see high numbers, notify your primary care doctor.     Call your SURGEON'S OFFICE ([phone]) if you experience the following signs and symptoms of infection:   Unusual redness, swelling, excessive, cloudy or foul smelling drainage at the incision site.   Persistent low grade temp OR a temp greater than 102 F, unrelieved by Tylenol  Pain at surgical site, unrelieved by pain meds    Warning signs of a blood clot in your leg: (CALL YOUR SURGEON)  New onset or increasing pain in calf, new onset tenderness or redness above or below the knee or increasing swelling of your calf, ankle, or foot.  Warning signs that a blood clot has traveled to your lungs: (REPORT TO THE ER/CALL 920)  Sudden or increase in Shortness of breath, sudden onset of chest pains, or  Localized chest pain with coughing.         IF ANY ISSUES ARISE AND YOU FEEL THE NEED TO CALL YOUR PRIMARY CARE DOCTOR, PLEASE LET YOUR SURGEON KNOW AS WELL.     For emergencies, please report to OUR ([facility] main [facility]) Emergency department and tell them to call YOUR SURGEON at [phone].     BEFORE MAKING ANY CHANGES TO THE MEDICAL CARE PLAN OR GOING TO THE EMERGENCY ROOM, PLEASE CONTACT THE SURGEON.    3rd floor nursing unit # [phone]  Use this number for questions about your discharge instructions or problems filling your discharge prescriptions.

## 2022-10-11 NOTE — PLAN OF CARE
Problem: Adult Inpatient Plan of Care  Goal: Plan of Care Review  Outcome: Ongoing, Progressing  Goal: Patient-Specific Goal (Individualized)  Outcome: Ongoing, Progressing  Goal: Absence of Hospital-Acquired Illness or Injury  Outcome: Ongoing, Progressing  Goal: Optimal Comfort and Wellbeing  Outcome: Ongoing, Progressing  Goal: Readiness for Transition of Care  Outcome: Ongoing, Progressing     Problem: COPD (Chronic Obstructive Pulmonary Disease) Comorbidity  Goal: Maintenance of COPD Symptom Control  Outcome: Ongoing, Progressing     Problem: Bariatric Environmental Safety  Goal: Safety Maintained with Care  Outcome: Ongoing, Progressing     Problem: Obstructive Sleep Apnea Risk or Actual Comorbidity Management  Goal: Unobstructed Breathing During Sleep  Outcome: Ongoing, Progressing     Problem: Pain Acute  Goal: Acceptable Pain Control and Functional Ability  Outcome: Ongoing, Progressing     Problem: Fall Injury Risk  Goal: Absence of Fall and Fall-Related Injury  Outcome: Ongoing, Progressing

## 2022-10-12 VITALS
OXYGEN SATURATION: 95 % | HEIGHT: 70 IN | SYSTOLIC BLOOD PRESSURE: 158 MMHG | DIASTOLIC BLOOD PRESSURE: 72 MMHG | TEMPERATURE: 99 F | RESPIRATION RATE: 18 BRPM | BODY MASS INDEX: 41.5 KG/M2 | HEART RATE: 76 BPM | WEIGHT: 289.88 LBS

## 2022-10-12 LAB
ANION GAP SERPL CALC-SCNC: 7 MEQ/L
BUN SERPL-MCNC: 23.4 MG/DL (ref 8.4–25.7)
CALCIUM SERPL-MCNC: 8.5 MG/DL (ref 8.8–10)
CHLORIDE SERPL-SCNC: 109 MMOL/L (ref 98–107)
CO2 SERPL-SCNC: 27 MMOL/L (ref 23–31)
CREAT SERPL-MCNC: 0.94 MG/DL (ref 0.73–1.18)
CREAT/UREA NIT SERPL: 25
ERYTHROCYTE [DISTWIDTH] IN BLOOD BY AUTOMATED COUNT: 13.5 % (ref 11.5–17)
GFR SERPLBLD CREATININE-BSD FMLA CKD-EPI: >60 MLS/MIN/1.73/M2
GLUCOSE SERPL-MCNC: 129 MG/DL (ref 82–115)
HCT VFR BLD AUTO: 30.9 % (ref 42–52)
HGB BLD-MCNC: 10.4 GM/DL (ref 14–18)
MCH RBC QN AUTO: 29.5 PG (ref 27–31)
MCHC RBC AUTO-ENTMCNC: 33.7 MG/DL (ref 33–36)
MCV RBC AUTO: 87.5 FL (ref 80–94)
NRBC BLD AUTO-RTO: 0 %
PLATELET # BLD AUTO: 119 X10(3)/MCL (ref 130–400)
PLATELETS.RETICULATED NFR BLD AUTO: 2.1 % (ref 0.9–11.2)
PMV BLD AUTO: 10.6 FL (ref 7.4–10.4)
POTASSIUM SERPL-SCNC: 3.5 MMOL/L (ref 3.5–5.1)
RBC # BLD AUTO: 3.53 X10(6)/MCL (ref 4.7–6.1)
SODIUM SERPL-SCNC: 143 MMOL/L (ref 136–145)
WBC # SPEC AUTO: 10.3 X10(3)/MCL (ref 4.5–11.5)

## 2022-10-12 PROCEDURE — 25000003 PHARM REV CODE 250: Performed by: NURSE PRACTITIONER

## 2022-10-12 PROCEDURE — 85027 COMPLETE CBC AUTOMATED: CPT | Performed by: ORTHOPAEDIC SURGERY

## 2022-10-12 PROCEDURE — 80048 BASIC METABOLIC PNL TOTAL CA: CPT | Performed by: ORTHOPAEDIC SURGERY

## 2022-10-12 PROCEDURE — 97110 THERAPEUTIC EXERCISES: CPT

## 2022-10-12 PROCEDURE — 94799 UNLISTED PULMONARY SVC/PX: CPT

## 2022-10-12 PROCEDURE — 25000003 PHARM REV CODE 250: Performed by: ORTHOPAEDIC SURGERY

## 2022-10-12 PROCEDURE — 94761 N-INVAS EAR/PLS OXIMETRY MLT: CPT

## 2022-10-12 PROCEDURE — 36415 COLL VENOUS BLD VENIPUNCTURE: CPT | Performed by: ORTHOPAEDIC SURGERY

## 2022-10-12 RX ORDER — ASPIRIN 81 MG/1
81 TABLET ORAL EVERY 12 HOURS
Qty: 84 TABLET | Refills: 0
Start: 2022-10-12 | End: 2022-11-23

## 2022-10-12 RX ORDER — TRAMADOL HYDROCHLORIDE 50 MG/1
50 TABLET ORAL EVERY 4 HOURS PRN
Qty: 42 TABLET | Refills: 0 | Status: SHIPPED | OUTPATIENT
Start: 2022-10-12 | End: 2022-10-19

## 2022-10-12 RX ORDER — POLYETHYLENE GLYCOL 3350 17 G/17G
17 POWDER, FOR SOLUTION ORAL DAILY
Qty: 14 EACH
Start: 2022-10-12 | End: 2022-10-26

## 2022-10-12 RX ORDER — ACETAMINOPHEN 500 MG
500 TABLET ORAL EVERY 4 HOURS
Qty: 84 TABLET | Refills: 0
Start: 2022-10-12 | End: 2022-10-26

## 2022-10-12 RX ORDER — METHOCARBAMOL 500 MG/1
500 TABLET, FILM COATED ORAL EVERY 6 HOURS
Qty: 56 TABLET | Refills: 0 | Status: SHIPPED | OUTPATIENT
Start: 2022-10-12 | End: 2022-10-26

## 2022-10-12 RX ADMIN — SERTRALINE HYDROCHLORIDE 100 MG: 50 TABLET ORAL at 09:10

## 2022-10-12 RX ADMIN — LACTULOSE 20 G: 20 SOLUTION ORAL at 12:10

## 2022-10-12 RX ADMIN — ASPIRIN 81 MG 81 MG: 81 TABLET ORAL at 09:10

## 2022-10-12 RX ADMIN — ALLOPURINOL 300 MG: 300 TABLET ORAL at 09:10

## 2022-10-12 RX ADMIN — TRAMADOL HYDROCHLORIDE 50 MG: 50 TABLET, COATED ORAL at 09:10

## 2022-10-12 RX ADMIN — ACETAMINOPHEN 500 MG: 500 TABLET ORAL at 12:10

## 2022-10-12 RX ADMIN — KETOROLAC TROMETHAMINE 10 MG: 10 TABLET, FILM COATED ORAL at 06:10

## 2022-10-12 RX ADMIN — CARVEDILOL 12.5 MG: 6.25 TABLET, FILM COATED ORAL at 09:10

## 2022-10-12 RX ADMIN — ACETAMINOPHEN 500 MG: 500 TABLET ORAL at 04:10

## 2022-10-12 RX ADMIN — AMLODIPINE AND BENAZEPRIL HYDROCHLORIDE 1 CAPSULE: 5; 20 CAPSULE ORAL at 09:10

## 2022-10-12 RX ADMIN — ACETAMINOPHEN 500 MG: 500 TABLET ORAL at 09:10

## 2022-10-12 RX ADMIN — FUROSEMIDE 20 MG: 20 TABLET ORAL at 09:10

## 2022-10-12 RX ADMIN — FAMOTIDINE 20 MG: 20 TABLET, FILM COATED ORAL at 09:10

## 2022-10-12 RX ADMIN — SENNOSIDES AND DOCUSATE SODIUM 2 TABLET: 8.6; 5 TABLET ORAL at 09:10

## 2022-10-12 RX ADMIN — KETOROLAC TROMETHAMINE 10 MG: 10 TABLET, FILM COATED ORAL at 12:10

## 2022-10-12 RX ADMIN — DOCUSATE SODIUM 200 MG: 100 CAPSULE, LIQUID FILLED ORAL at 09:10

## 2022-10-12 NOTE — PROGRESS NOTES
"No acute events overnight.  Pain controlled.  Resting in bed.     Vital Signs  Temp: 98.9 °F (37.2 °C)  Temp src: Oral  Pulse: 76  Heart Rate Source: Monitor  Resp: 18  SpO2: 95 %  Pulse Oximetry Type: Intermittent  Flow (L/min): 2  Oxygen Concentration (%): 80  O2 Device (Oxygen Therapy): room air  BP: (!) 158/72  BP Location: Right arm  BP Method: Automatic  Patient Position: Lying  Height and Weight  Height: 5' 10" (177.8 cm)  Height Method: Stated  Weight: 131.5 kg (289 lb 14.5 oz)  Weight Method: Bed Scale  BSA (Calculated - sq m): 2.55 sq meters  BMI (Calculated): 41.6  Weight in (lb) to have BMI = 25: 173.9]    +FHL/EHL  BCR distally  Dressing c/d/i  SILT distally    Recent Lab Results         10/12/22  0522        Immature Platelet Fraction 2.1       Anion Gap 7.0       BUN 23.4       BUN/CREAT RATIO 25       Calcium 8.5       Chloride 109       CO2 27       Creatinine 0.94       eGFR >60       Glucose 129       Hematocrit 30.9       Hemoglobin 10.4       MCH 29.5       MCHC 33.7       MCV 87.5       MPV 10.6       nRBC 0.0       Platelets 119       Potassium 3.5       RBC 3.53       RDW 13.5       Sodium 143       WBC 10.3               A/P:  Status post TKA  Pain controlled  Overall patient doing well.  Therapy for mobility and ambulation.  ASA for DVT PPx  Home today    Addendum per Hillray Sprague NP   DVT Prophylaxis - ASA 81mg BID  Bowel Prophylaxis - MIralax/Senokot S/Reglan  (+) Voiding, (+) Flatus, (+) BM  Pain Controlled, Deemed Safe from a PT Standpoint  Ready for discharge     Discussed discharge instructions in length with patient and family at bedside. Verbalized Understanding. Discharge instructions included:   Safety - fall prevention, caution in rooms with a water source, use of walker until otherwise instructed by physical therapy or the physician  Infection prevention - proper hand washing, technique for dressing changes, abx bf dental/small surgical procedures, no smoking, proper blood sugar " control and addressing UTI and skin infections ASAP.   DVT prophylaxis - Blood thinner, PATITO hose for 2-6 weeks or until otherwise instructed by the doctor and frequent ambulation   Constipation Prevention- Stool softeners while on pain meds, increase fiber in diet and drink lots of water and the use of laxatives if bowel movements do not return to normal within 1 day, two days in the latest.  Pneumonia Prevention: Out of bed as much as possible, continue IS q1-2 hours, frequent ambulation  Provided written discharge instructions to the patient with the above information.    Instructed to call surgeon with any needs or concerns, 911 and/or ER for emergencies

## 2022-10-12 NOTE — PLAN OF CARE
Problem: Adult Inpatient Plan of Care  Goal: Plan of Care Review  Outcome: Ongoing, Progressing  Goal: Patient-Specific Goal (Individualized)  Outcome: Ongoing, Progressing  Goal: Absence of Hospital-Acquired Illness or Injury  Outcome: Ongoing, Progressing  Goal: Optimal Comfort and Wellbeing  Outcome: Ongoing, Progressing  Goal: Readiness for Transition of Care  Outcome: Ongoing, Progressing     Problem: Bariatric Environmental Safety  Goal: Safety Maintained with Care  Outcome: Ongoing, Progressing     Problem: COPD (Chronic Obstructive Pulmonary Disease) Comorbidity  Goal: Maintenance of COPD Symptom Control  Outcome: Ongoing, Progressing     Problem: Hypertension Comorbidity  Goal: Blood Pressure in Desired Range  Outcome: Ongoing, Progressing     Problem: Obstructive Sleep Apnea Risk or Actual Comorbidity Management  Goal: Unobstructed Breathing During Sleep  Outcome: Ongoing, Progressing     Problem: Pain Acute  Goal: Acceptable Pain Control and Functional Ability  Outcome: Ongoing, Progressing     Problem: Fall Injury Risk  Goal: Absence of Fall and Fall-Related Injury  Outcome: Ongoing, Progressing     Problem: Adjustment to Surgery (Knee Arthroplasty)  Goal: Optimal Coping  Outcome: Ongoing, Progressing     Problem: Bleeding (Knee Arthroplasty)  Goal: Absence of Bleeding  Outcome: Ongoing, Progressing     Problem: Infection (Knee Arthroplasty)  Goal: Absence of Infection Signs and Symptoms  Outcome: Ongoing, Progressing

## 2022-10-12 NOTE — PLAN OF CARE
Problem: Adult Inpatient Plan of Care  Goal: Plan of Care Review  Outcome: Met  Goal: Patient-Specific Goal (Individualized)  Outcome: Met  Goal: Absence of Hospital-Acquired Illness or Injury  Outcome: Met  Goal: Optimal Comfort and Wellbeing  Outcome: Met  Goal: Readiness for Transition of Care  Outcome: Met     Problem: Bariatric Environmental Safety  Goal: Safety Maintained with Care  Outcome: Met     Problem: COPD (Chronic Obstructive Pulmonary Disease) Comorbidity  Goal: Maintenance of COPD Symptom Control  Outcome: Met     Problem: Hypertension Comorbidity  Goal: Blood Pressure in Desired Range  Outcome: Met     Problem: Obstructive Sleep Apnea Risk or Actual Comorbidity Management  Goal: Unobstructed Breathing During Sleep  Outcome: Met     Problem: Pain Acute  Goal: Acceptable Pain Control and Functional Ability  Outcome: Met     Problem: Fall Injury Risk  Goal: Absence of Fall and Fall-Related Injury  Outcome: Met     Problem: Adjustment to Surgery (Knee Arthroplasty)  Goal: Optimal Coping  Outcome: Met

## 2022-10-12 NOTE — NURSING
Discharge teaching done with pt and wife. Both voiced understanding  of teaching. Copy of instructions and dressing supplies given to pt.  Pt stated has equipment needed ( walker) for home and feels ready for discharge.

## 2022-10-12 NOTE — PT/OT/SLP PROGRESS
"Physical Therapy Treatment    Patient Name:  Camden Uriarte   MRN:  [id number]    Recommendations:     Discharge Recommendations:  home, outpatient PT   Discharge Equipment Recommendations: walker, rolling   Barriers to discharge: None    Assessment:     Camden Uriarte is a 76 y.o. male admitted with a medical diagnosis of Primary osteoarthritis of right knee.  He presents with the following impairments/functional limitations:  weakness, impaired functional mobility, gait instability, impaired balance, decreased lower extremity function, pain, decreased ROM, edema, orthopedic precautions.    Rehab Prognosis: Good; patient would benefit from acute skilled PT services to address these deficits and reach maximum level of function.    Recent Surgery: Procedure(s) (LRB):  ROBOTIC ARTHROPLASTY, KNEE, TOTAL (Right) 2 Days Post-Op    Plan:     During this hospitalization, patient to be seen BID to address the identified rehab impairments via gait training, therapeutic activities, therapeutic exercises and progress toward the following goals:    Plan of Care Expires:  10/14/22    Subjective     Chief Complaint: R knee pain  Patient/Family Comments/goals: "To go home today"  Pain/Comfort:  Location - Side 1: Right  Location 1: knee  Pain Addressed 1: Pre-medicate for activity, Reposition, Distraction, Cessation of Activity, Nurse notified      Objective:     Communicated with ABRAHAN Chacon prior to session.  Patient found supine with wife present upon PT entry to room.     General Precautions: Standard,     Orthopedic Precautions:RLE weight bearing as tolerated   Braces:    Respiratory Status: Room air     Functional Mobility:  Bed Mobility:     Supine to Sit: modified independence  Transfers:     Sit to Stand:  modified independence with rolling walker  Gait: 150ft with RW, Mod I.       (In degrees) AROM PROM   R knee flexion 60 70   R knee extension 0        Therapeutic Activities and Exercises:   TKA HEP 1x10 RLE, pt " demonstrates good form and understanding of all.    Patient left sitting edge of bed with call button in reach, NSG notified, and wife present..    GOALS:   Multidisciplinary Problems       Physical Therapy Goals       Not on file              Multidisciplinary Problems (Resolved)          Problem: Physical Therapy    Goal Priority Disciplines Outcome Goal Variances Interventions   Physical Therapy Goal   (Resolved)     PT, PT/OT Met     Description: Pt will improve functional independence by performing:    Bed mobility: SBA  Sit to stand: SBA  Car Transfer: Min A  with rolling walker  Ambulation x 200'  feet with SBA and rolling walker  1 Step (Curb): Min A  and rolling walker  3 Steps: Min A  and B HR  right knee AROM flexion (in degrees): 70  right knee AROM extension (in degrees): 0   Independent with total knee HEP      All of the above goals MET 10/11/22                        Time Tracking:     PT Received On:    PT Start Time: 0900     PT Stop Time: 0919  PT Total Time (min): 19 min     Billable Minutes: Therapeutic Exercise 19 min    Pt seen for PT Last Visit session. Answered all last questions/concerns as appropriate. Pt is ready for DC from hospital later today. This note to serve as DC Summary Note as well.       Treatment Type: Treatment  PT/PTA: PT           10/12/2022

## 2022-10-13 ENCOUNTER — PATIENT OUTREACH (OUTPATIENT)
Dept: ADMINISTRATIVE | Facility: CLINIC | Age: 77
End: 2022-10-13
Payer: MEDICARE

## 2022-10-13 NOTE — PROGRESS NOTES
C3 nurse attempted to contact Camden Uriarte  for a TCC post hospital discharge follow up call. No answer. Left voicemail with callback information. The patient has a scheduled HOSFU appointment with Dr. Ibrahim on 10/25/2022 @ 0915 am.

## 2022-10-14 LAB — VIEW PATHOLOGY REPORT (RELIAPATH): NORMAL

## 2022-10-14 NOTE — PROGRESS NOTES
C3 nurse spoke with patient's wife, Steph for a TCC post hospital discharge follow up call. The patient has a scheduled HOSFU appointment with Dr. Ibrahim on 10/25/2022 @ 0915 am. Advised to call ANDRIA FISCHER DO regarding patient's shortness of breath and if unable to reach PCP call Dr. Ibrahim; verbalized understanding.

## 2022-10-14 NOTE — DISCHARGE SUMMARY
[facility]  Discharge Summary      Admit Date: 10/10/2022    Discharge Date and Time: 10/12/2022 12:50 PM    Attending Physician: Domi att. providers found     Reason for Admission: primary osteoarthritis right knee    Procedures Performed: Procedure(s) (LRB):  ROBOTIC ARTHROPLASTY, KNEE, TOTAL (Right)    Hospital Course Patient seen in clinic with complaints of right knee pain. Tried and failed all conservative measures including activity modification, anti-inflammatories, and injections. Patient felt as though they have reached a point of disability with regards to right knee pain. Risk, benefits, and alternatives discussed for right total knee arthroplasty. Despite these risks, the patient wished to proceed with surgical intervention.    Patient admitted as an outpatient. Seen preoperatively by anesthesia and cleared for surgery. Patient was then taken to the OR and a right total knee arthroplasty was performed. For full details please see dictated operative note. Patient tolerated the procedure without any issues and was transferred to the floor postoperatively. Physical therapy began working with the patient on postop day 1 and patient was made weightbearing as tolerated to affected extremity. Patient progressed well with physical therapy and eventually cleared all physical therapy guidelines. Patient's pain and vitals remained stable throughout hospitalization. Wound was checked and found to be clean, dry, and intact. At this point the patient was deemed suitable to discharge home     Goals of Care Treatment Preferences:  Code Status: Full Code      Consults: PT    Significant Diagnostic Studies:   Specimen (24h ago, onward)      None            Final Diagnoses:    Principal Problem: Primary osteoarthritis of right knee   Secondary Diagnoses:   Active Hospital Problems    Diagnosis  POA    *Primary osteoarthritis of right knee [M17.11]  Yes      Resolved Hospital Problems   No  resolved problems to display.       Discharged Condition: good    Disposition: Home or Self Care    Follow Up/Patient Instructions:     Medications:  Reconciled Home Medications:      Medication List        START taking these medications      acetaminophen 500 MG tablet  Commonly known as: TYLENOL  Take 1 tablet (500 mg total) by mouth every 4 (four) hours. for 14 days     aspirin 81 MG EC tablet  Commonly known as: ECOTRIN  Take 1 tablet (81 mg total) by mouth every 12 (twelve) hours. Blood clot prevention     methocarbamoL 500 MG Tab  Commonly known as: ROBAXIN  Take 1 tablet (500 mg total) by mouth every 6 (six) hours. for 14 days     polyethylene glycol 17 gram Pwpk  Commonly known as: GLYCOLAX  Take 17 g by mouth once daily. Constipation Prevention for 14 days     traMADoL 50 mg tablet  Commonly known as: ULTRAM  Take 1 tablet (50 mg total) by mouth every 4 (four) hours as needed for Pain.            CONTINUE taking these medications      allopurinoL 300 MG tablet  Commonly known as: ZYLOPRIM  Take 300 mg by mouth once daily.     amlodipine-benazepril 5-20 mg 5-20 mg per capsule  Commonly known as: LOTREL  Take 1 capsule by mouth once daily.     atorvastatin 40 MG tablet  Commonly known as: LIPITOR  Take 40 mg by mouth once daily.     carvediloL 12.5 MG tablet  Commonly known as: COREG  Take 12.5 mg by mouth 2 (two) times daily with meals.     cetirizine 10 MG tablet  Commonly known as: ZYRTEC  Take 10 mg by mouth once daily.     furosemide 20 MG tablet  Commonly known as: LASIX  Take 20 mg by mouth once daily.     meclizine 25 mg tablet  Commonly known as: ANTIVERT  Take 25 mg by mouth once daily.     omega-3 acid ethyl esters 1 gram capsule  Commonly known as: LOVAZA  Take 2 g by mouth 2 (two) times daily. Ran out     sertraline 100 MG tablet  Commonly known as: ZOLOFT  Take 100 mg by mouth once daily. 1 tab in am then half tab at hs     SPIRIVA WITH HANDIHALER 18 mcg inhalation capsule  Generic drug:  tiotropium  Inhale 18 mcg into the lungs once daily.     tamsulosin 0.4 mg Cap  Commonly known as: FLOMAX  Take 0.4 mg by mouth every evening.     vitamin E 100 UNIT capsule  Take 100 Units by mouth once daily.            No discharge procedures on file.   Follow-up Information       Omer Ibrahim MD. Go on 10/25/2022.    Specialty: Orthopedic Surgery  Why: Ortho follow up appt on Tuesday 10/25/22 @ 9:15am.  Contact information:  [address] [facility] [address] [facility]  [address]  [place] [address]  [phone]               [facility] Follow up.    Why: This is the equipment company. Call if you have questions or concerns.  Contact information:  [facility] [facility]  [place] [address] [phone]               [facility]    Why: This is the outpatient therapy facility. They will contact pt with appt date & time. Call if you have questions or concerns.  Contact information:  [address] [place] [address]  [phone]                              no indicators present

## 2022-10-18 ENCOUNTER — OFFICE VISIT (OUTPATIENT)
Dept: ORTHOPEDICS | Facility: CLINIC | Age: 77
End: 2022-10-18
Payer: MEDICARE

## 2022-10-18 VITALS
HEIGHT: 70 IN | DIASTOLIC BLOOD PRESSURE: 72 MMHG | HEART RATE: 71 BPM | SYSTOLIC BLOOD PRESSURE: 150 MMHG | WEIGHT: 288 LBS | TEMPERATURE: 99 F | BODY MASS INDEX: 41.23 KG/M2

## 2022-10-18 DIAGNOSIS — Z47.1 AFTERCARE FOLLOWING RIGHT KNEE JOINT REPLACEMENT SURGERY: Primary | ICD-10-CM

## 2022-10-18 DIAGNOSIS — Z96.651 AFTERCARE FOLLOWING RIGHT KNEE JOINT REPLACEMENT SURGERY: Primary | ICD-10-CM

## 2022-10-18 PROCEDURE — 99024 PR POST-OP FOLLOW-UP VISIT: ICD-10-PCS | Mod: POP,,, | Performed by: ORTHOPAEDIC SURGERY

## 2022-10-18 PROCEDURE — 99024 POSTOP FOLLOW-UP VISIT: CPT | Mod: POP,,, | Performed by: ORTHOPAEDIC SURGERY

## 2022-10-18 NOTE — PROGRESS NOTES
Past Medical History:   Diagnosis Date    Anemia, unspecified     Arthritis     COPD (chronic obstructive pulmonary disease)     Elevated cholesterol     Hypertension     Sleep apnea        Past Surgical History:   Procedure Laterality Date    gastric sleeve      kidney stones      ROBOTIC ARTHROPLASTY, KNEE Right 10/10/2022    Procedure: ROBOTIC ARTHROPLASTY, KNEE, TOTAL;  Surgeon: Omer Ibrahim MD;  Location: [facility];  Service: Orthopedics;  Laterality: Right;    TOTAL SHOULDER ARTHROPLASTY Right 2018    Dr. Ceja       Current Outpatient Medications   Medication Sig    acetaminophen (TYLENOL) 500 MG tablet Take 1 tablet (500 mg total) by mouth every 4 (four) hours. for 14 days    allopurinoL (ZYLOPRIM) 300 MG tablet Take 300 mg by mouth once daily.    amlodipine-benazepril 5-20 mg (LOTREL) 5-20 mg per capsule Take 1 capsule by mouth once daily.    aspirin (ECOTRIN) 81 MG EC tablet Take 1 tablet (81 mg total) by mouth every 12 (twelve) hours. Blood clot prevention    atorvastatin (LIPITOR) 40 MG tablet Take 40 mg by mouth once daily.    carvediloL (COREG) 12.5 MG tablet Take 12.5 mg by mouth 2 (two) times daily with meals.    cetirizine (ZYRTEC) 10 MG tablet Take 10 mg by mouth once daily.    furosemide (LASIX) 20 MG tablet Take 20 mg by mouth once daily.    meclizine (ANTIVERT) 25 mg tablet Take 25 mg by mouth once daily.    methocarbamoL (ROBAXIN) 500 MG Tab Take 1 tablet (500 mg total) by mouth every 6 (six) hours. for 14 days    omega-3 acid ethyl esters (LOVAZA) 1 gram capsule Take 2 g by mouth 2 (two) times daily. Ran out    polyethylene glycol (GLYCOLAX) 17 gram PwPk Take 17 g by mouth once daily. Constipation Prevention for 14 days    sertraline (ZOLOFT) 100 MG tablet Take 100 mg by mouth once daily. 1 tab in am then half tab at hs    tamsulosin (FLOMAX) 0.4 mg Cap Take 0.4 mg by mouth every evening.    tiotropium (SPIRIVA WITH HANDIHALER) 18 mcg inhalation capsule Inhale 18 mcg into the lungs once  daily.    traMADoL (ULTRAM) 50 mg tablet Take 1 tablet (50 mg total) by mouth every 4 (four) hours as needed for Pain.    vitamin E 100 UNIT capsule Take 100 Units by mouth once daily.     No current facility-administered medications for this visit.     Facility-Administered Medications Ordered in Other Visits   Medication    hyaluronate (SYNVISC) 16 mg/2 mL injection 16 mg    LIDOcaine HCL 20 mg/ml (2%) injection 2 mL       Review of patient's allergies indicates:   Allergen Reactions    Sulfa (sulfonamide antibiotics)        History reviewed. No pertinent family history.    Social History     Socioeconomic History    Marital status:    Tobacco Use    Smoking status: Never    Smokeless tobacco: Never   Substance and Sexual Activity    Alcohol use: Yes     Alcohol/week: 7.0 standard drinks     Types: 7 Cans of beer per week    Drug use: Never    Sexual activity: Yes     Partners: Female       Chief Complaint:   Chief Complaint   Patient presents with    Right Knee - Bleeding/Bruising     Right TKA 10/10/22. Gl 1/8/23. Pt states that he has experienced bleeding since sx. States he is still having major pain . tried medication and ice packs with no relief. States he did some pt with little to no relief. ER told him he has blood clots.        History of present illness:  76-year-old male presents today for evaluation follow-up of right knee pain status post total knee arthroplasty week ago.  Patient has some shortness of breath on Friday.  Was seen in the ER at an outside facility.  Ultrasound and CT scan were performed.  They said they noted multiple small clots in his lungs.  He was admitted for 48 hours.  He was given Lovenox and transition to Eliquis.  He has since been discharged home on Eliquis.  He is working physical therapy.  Having some pain although it is currently controlled.  Had some postoperative drainage although this is improving.      Review of Systems:    Constitution: Negative for chills,  fever, and sweats.  Negative for unexplained weight loss.    HENT:  Negative for headaches and blurry vision.    Cardiovascular:Negative for chest pain or irregular heart beat. Negative for hypertension.    Respiratory:  Negative for cough and shortness of breath.    Gastrointestinal: Negative for abdominal pain, heartburn, melena, nausea, and vomitting.    Genitourinary:  Negative bladder incontinence and dysuria.    Musculoskeletal:  See HPI    Neurological: Negative for numbness.    Psychiatric/Behavioral: Negative for depression.  The patient is not nervous/anxious.      Endocrine: Negative for polyuria    Hematologic/Lymphatic: Negative for bleeding problem.  Does not bruise/bleed easily.    Skin: Negative for poor would healing and rash      Physical Examination:    Vital Signs:    Vitals:    10/18/22 1410   BP: (!) 150/72   Pulse: 71   Temp: 98.7 °F (37.1 °C)       Body mass index is 41.32 kg/m².    General: No acute distress, alert and oriented, healthy appearing    HEENT: Head is atraumatic, mucous membranes are moist    Neck: Supples, no JVD    Cardiovascular: Palpable dorsalis pedis and posterior tibial pulses, regular rate and rhythm to those pulses    Lungs: Breathing non-labored    Skin: no rashes appreciated    Neurologic: Can flex and extend knees, ankles, and toes. Sensation is grossly intact    Right knee:  Patient's leg is swollen.  His incisions clean and dry.  There is no erythema.  No significant drainage currently.  Range of motion from 5-90.    X-rays:      Assessment::  Status post right knee arthroplasty with postoperative pulmonary embolus    Plan:  Discussed all treatment options the patient.  He is now on Eliquis.  This point I would recommend Eliquis for 3 months given his history of PE.  We will defer this to his primary care physician.  From a total knee standpoint he is at least 4 months.  He is going to be followed up by his primary care physician with regards to his anticoagulation  and long-term treatment.  Will plan to see him back at his next appointment in a week.  He is going to elevate his leg, ice it, compressed with an Ace wrap from his toes to his groin he decreased his right lower extremity swelling.    This note was created using [employer] voice recognition software that occasionally misinterpreted phrases or words.    Consult note is delivered via Epic messaging service.

## 2022-10-20 RX ORDER — CEPHALEXIN 500 MG/1
500 CAPSULE ORAL 4 TIMES DAILY
Qty: 28 CAPSULE | Refills: 0 | Status: SHIPPED | OUTPATIENT
Start: 2022-10-20 | End: 2022-10-27

## 2022-10-25 ENCOUNTER — OFFICE VISIT (OUTPATIENT)
Dept: ORTHOPEDICS | Facility: CLINIC | Age: 77
End: 2022-10-25
Payer: MEDICARE

## 2022-10-25 ENCOUNTER — HOSPITAL ENCOUNTER (OUTPATIENT)
Dept: RADIOLOGY | Facility: CLINIC | Age: 77
Discharge: HOME | End: 2022-10-25
Attending: ORTHOPAEDIC SURGERY
Payer: MEDICARE

## 2022-10-25 VITALS
HEIGHT: 70 IN | WEIGHT: 288 LBS | SYSTOLIC BLOOD PRESSURE: 135 MMHG | HEART RATE: 71 BPM | DIASTOLIC BLOOD PRESSURE: 57 MMHG | BODY MASS INDEX: 41.23 KG/M2

## 2022-10-25 DIAGNOSIS — M17.11 PRIMARY OSTEOARTHRITIS OF RIGHT KNEE: Primary | ICD-10-CM

## 2022-10-25 DIAGNOSIS — M17.11 PRIMARY OSTEOARTHRITIS OF RIGHT KNEE: ICD-10-CM

## 2022-10-25 PROCEDURE — 99024 POSTOP FOLLOW-UP VISIT: CPT | Mod: POP,,, | Performed by: ORTHOPAEDIC SURGERY

## 2022-10-25 PROCEDURE — 73562 X-RAY EXAM OF KNEE 3: CPT | Mod: RT,,, | Performed by: ORTHOPAEDIC SURGERY

## 2022-10-25 PROCEDURE — 99024 PR POST-OP FOLLOW-UP VISIT: ICD-10-PCS | Mod: POP,,, | Performed by: ORTHOPAEDIC SURGERY

## 2022-10-25 PROCEDURE — 73562 XR KNEE 3 VIEW RIGHT: ICD-10-PCS | Mod: RT,,, | Performed by: ORTHOPAEDIC SURGERY

## 2022-10-25 NOTE — PROGRESS NOTES
Past Medical History:   Diagnosis Date    Anemia, unspecified     Arthritis     Cancer     Lips    COPD (chronic obstructive pulmonary disease)     Elevated cholesterol     Hypertension     Sleep apnea     Thrombosis     Lungs       Past Surgical History:   Procedure Laterality Date    gastric sleeve      kidney stones      ROBOTIC ARTHROPLASTY, KNEE Right 10/10/2022    Procedure: ROBOTIC ARTHROPLASTY, KNEE, TOTAL;  Surgeon: Omer Ibrahim MD;  Location: [facility];  Service: Orthopedics;  Laterality: Right;    TOTAL SHOULDER ARTHROPLASTY Right 2018    Dr. Ceja       Current Outpatient Medications   Medication Sig    acetaminophen (TYLENOL) 500 MG tablet Take 1 tablet (500 mg total) by mouth every 4 (four) hours. for 14 days    allopurinoL (ZYLOPRIM) 300 MG tablet Take 300 mg by mouth once daily.    amlodipine-benazepril 5-20 mg (LOTREL) 5-20 mg per capsule Take 1 capsule by mouth once daily.    aspirin (ECOTRIN) 81 MG EC tablet Take 1 tablet (81 mg total) by mouth every 12 (twelve) hours. Blood clot prevention    atorvastatin (LIPITOR) 40 MG tablet Take 40 mg by mouth once daily.    carvediloL (COREG) 12.5 MG tablet Take 12.5 mg by mouth 2 (two) times daily with meals.    cephALEXin (KEFLEX) 500 MG capsule Take 1 capsule (500 mg total) by mouth 4 (four) times daily. for 7 days    cetirizine (ZYRTEC) 10 MG tablet Take 10 mg by mouth once daily.    furosemide (LASIX) 20 MG tablet Take 20 mg by mouth once daily.    meclizine (ANTIVERT) 25 mg tablet Take 25 mg by mouth once daily.    methocarbamoL (ROBAXIN) 500 MG Tab Take 1 tablet (500 mg total) by mouth every 6 (six) hours. for 14 days    omega-3 acid ethyl esters (LOVAZA) 1 gram capsule Take 2 g by mouth 2 (two) times daily. Ran out    polyethylene glycol (GLYCOLAX) 17 gram PwPk Take 17 g by mouth once daily. Constipation Prevention for 14 days    sertraline (ZOLOFT) 100 MG tablet Take 100 mg by mouth once daily. 1 tab in am then half tab at hs    tamsulosin  (FLOMAX) 0.4 mg Cap Take 0.4 mg by mouth every evening.    tiotropium (SPIRIVA WITH HANDIHALER) 18 mcg inhalation capsule Inhale 18 mcg into the lungs once daily.    vitamin E 100 UNIT capsule Take 100 Units by mouth once daily.     No current facility-administered medications for this visit.     Facility-Administered Medications Ordered in Other Visits   Medication    hyaluronate (SYNVISC) 16 mg/2 mL injection 16 mg    LIDOcaine HCL 20 mg/ml (2%) injection 2 mL       Review of patient's allergies indicates:   Allergen Reactions    Sulfa (sulfonamide antibiotics)        Family History   Problem Relation Age of Onset    Cancer Mother     Diabetes Maternal Grandmother        Social History     Socioeconomic History    Marital status:    Tobacco Use    Smoking status: Never    Smokeless tobacco: Never   Substance and Sexual Activity    Alcohol use: Yes     Alcohol/week: 7.0 standard drinks     Types: 7 Cans of beer per week    Drug use: Never    Sexual activity: Yes     Partners: Female       Chief Complaint:   Chief Complaint   Patient presents with    Post-op Evaluation     Pt reports a very difficult recovery after his sx. Reports an episode of blood clots in his lungs which lead him to stay in the hospital for 3 days. Pt complains about a throbbing/tightness/sharp pain which unable him to sleep. Pt is current in physical therapy 3x a week bu pt was unable to attend due his complications.       History of present illness:  56-year-old male status post total knee arthroplasty.  Patient overall doing fairly well.  Did have a she PE postoperatively although he is doing well.      Review of Systems:    Constitution: Negative for chills, fever, and sweats.  Negative for unexplained weight loss.    HENT:  Negative for headaches and blurry vision.    Cardiovascular:Negative for chest pain or irregular heart beat. Negative for hypertension.    Respiratory:  Negative for cough and shortness of  breath.    Gastrointestinal: Negative for abdominal pain, heartburn, melena, nausea, and vomitting.    Genitourinary:  Negative bladder incontinence and dysuria.    Musculoskeletal:  See HPI    Neurological: Negative for numbness.    Psychiatric/Behavioral: Negative for depression.  The patient is not nervous/anxious.      Endocrine: Negative for polyuria    Hematologic/Lymphatic: Negative for bleeding problem.  Does not bruise/bleed easily.    Skin: Negative for poor would healing and rash      Physical Examination:    Vital Signs:    Vitals:    10/25/22 0921   BP: (!) 135/57   Pulse: 71       Body mass index is 41.32 kg/m².    Right knee:  Swelling much improved.  Brisk cap refill distally.  Sensation and disappeared range of motion 0-90    X-rays:  Three views right knee reviewed.  Patient's implants appear well fixed.  No signs of loosening or subsidence noted.     Assessment::  Status post right knee arthroplasty with postoperative pulmonary embolus    Plan:  Overall doing very well.  Continue physical therapy.  Discontinue staples today.    This note was created using [employer] voice recognition software that occasionally misinterpreted phrases or words.    Consult note is delivered via Epic messaging service.

## 2022-11-03 DIAGNOSIS — Z47.1 AFTERCARE FOLLOWING RIGHT KNEE JOINT REPLACEMENT SURGERY: Primary | ICD-10-CM

## 2022-11-03 DIAGNOSIS — Z96.651 AFTERCARE FOLLOWING RIGHT KNEE JOINT REPLACEMENT SURGERY: Primary | ICD-10-CM

## 2022-11-03 RX ORDER — TRAMADOL HYDROCHLORIDE 50 MG/1
50 TABLET ORAL EVERY 6 HOURS PRN
COMMUNITY
End: 2022-11-03 | Stop reason: SDUPTHER

## 2022-11-03 RX ORDER — TRAMADOL HYDROCHLORIDE 50 MG/1
50 TABLET ORAL EVERY 6 HOURS PRN
Qty: 28 TABLET | Refills: 0 | Status: SHIPPED | OUTPATIENT
Start: 2022-11-03

## 2022-11-16 ENCOUNTER — TELEPHONE (OUTPATIENT)
Dept: ORTHOPEDICS | Facility: CLINIC | Age: 77
End: 2022-11-16
Payer: MEDICARE

## 2022-11-16 NOTE — TELEPHONE ENCOUNTER
Patient called stating that he had been going to PT, but he is having swelling of the knee and pain is not controlled.    Advised him to  take aleve to help with the inflammation and to keep his appointment for 11/17/22

## 2022-11-17 ENCOUNTER — OFFICE VISIT (OUTPATIENT)
Dept: ORTHOPEDICS | Facility: CLINIC | Age: 77
End: 2022-11-17
Payer: MEDICARE

## 2022-11-17 VITALS
BODY MASS INDEX: 40.18 KG/M2 | WEIGHT: 280 LBS | SYSTOLIC BLOOD PRESSURE: 163 MMHG | HEART RATE: 66 BPM | DIASTOLIC BLOOD PRESSURE: 84 MMHG

## 2022-11-17 DIAGNOSIS — Z47.1 AFTERCARE FOLLOWING RIGHT KNEE JOINT REPLACEMENT SURGERY: Primary | ICD-10-CM

## 2022-11-17 DIAGNOSIS — Z96.651 AFTERCARE FOLLOWING RIGHT KNEE JOINT REPLACEMENT SURGERY: Primary | ICD-10-CM

## 2022-11-17 PROCEDURE — 99024 PR POST-OP FOLLOW-UP VISIT: ICD-10-PCS | Mod: POP,,, | Performed by: ORTHOPAEDIC SURGERY

## 2022-11-17 PROCEDURE — 99024 POSTOP FOLLOW-UP VISIT: CPT | Mod: POP,,, | Performed by: ORTHOPAEDIC SURGERY

## 2022-11-17 NOTE — PROGRESS NOTES
Past Medical History:   Diagnosis Date    Anemia, unspecified     Arthritis     Cancer     Lips    COPD (chronic obstructive pulmonary disease)     Elevated cholesterol     Hypertension     Sleep apnea     Thrombosis     Lungs       Past Surgical History:   Procedure Laterality Date    gastric sleeve      kidney stones      ROBOTIC ARTHROPLASTY, KNEE Right 10/10/2022    Procedure: ROBOTIC ARTHROPLASTY, KNEE, TOTAL;  Surgeon: Omer Ibrahim MD;  Location: [facility];  Service: Orthopedics;  Laterality: Right;    TOTAL SHOULDER ARTHROPLASTY Right 2018    Dr. Ceja       Current Outpatient Medications   Medication Sig    allopurinoL (ZYLOPRIM) 300 MG tablet Take 300 mg by mouth once daily.    amlodipine-benazepril 5-20 mg (LOTREL) 5-20 mg per capsule Take 1 capsule by mouth once daily.    aspirin (ECOTRIN) 81 MG EC tablet Take 1 tablet (81 mg total) by mouth every 12 (twelve) hours. Blood clot prevention    atorvastatin (LIPITOR) 40 MG tablet Take 40 mg by mouth once daily.    carvediloL (COREG) 12.5 MG tablet Take 12.5 mg by mouth 2 (two) times daily with meals.    cetirizine (ZYRTEC) 10 MG tablet Take 10 mg by mouth once daily.    furosemide (LASIX) 20 MG tablet Take 20 mg by mouth once daily.    meclizine (ANTIVERT) 25 mg tablet Take 25 mg by mouth once daily.    omega-3 acid ethyl esters (LOVAZA) 1 gram capsule Take 2 g by mouth 2 (two) times daily. Ran out    sertraline (ZOLOFT) 100 MG tablet Take 100 mg by mouth once daily. 1 tab in am then half tab at hs    tamsulosin (FLOMAX) 0.4 mg Cap Take 0.4 mg by mouth every evening.    tiotropium (SPIRIVA WITH HANDIHALER) 18 mcg inhalation capsule Inhale 18 mcg into the lungs once daily.    traMADoL (ULTRAM) 50 mg tablet Take 1 tablet (50 mg total) by mouth every 6 (six) hours as needed for Pain.    vitamin E 100 UNIT capsule Take 100 Units by mouth once daily.     No current facility-administered medications for this visit.     Facility-Administered Medications  Ordered in Other Visits   Medication    hyaluronate (SYNVISC) 16 mg/2 mL injection 16 mg    LIDOcaine HCL 20 mg/ml (2%) injection 2 mL       Review of patient's allergies indicates:   Allergen Reactions    Sulfa (sulfonamide antibiotics)        Family History   Problem Relation Age of Onset    Cancer Mother     Diabetes Maternal Grandmother        Social History     Socioeconomic History    Marital status:    Tobacco Use    Smoking status: Never    Smokeless tobacco: Never   Substance and Sexual Activity    Alcohol use: Yes     Alcohol/week: 7.0 standard drinks     Types: 7 Cans of beer per week    Drug use: Never    Sexual activity: Yes     Partners: Female       Chief Complaint:   Chief Complaint   Patient presents with    Right Knee - Follow-up    Follow-up     f/u Rt TKA 10/10/22.pt going to PT 3 times a week, Pt does help. pt taking pain meds. pt mobilizing with walker. ROM limited       History of present illness:  77-year-old male status post total knee arthroplasty on the right.  Right knee has been complicated by pulmonary embolus.  He is on blood thinners for this.  Patient notes some swelling recently with difficulty with range of motion in his here today for evaluation of that      Review of Systems:    Constitution: Negative for chills, fever, and sweats.  Negative for unexplained weight loss.    HENT:  Negative for headaches and blurry vision.    Cardiovascular:Negative for chest pain or irregular heart beat. Negative for hypertension.    Respiratory:  Negative for cough and shortness of breath.    Gastrointestinal: Negative for abdominal pain, heartburn, melena, nausea, and vomitting.    Genitourinary:  Negative bladder incontinence and dysuria.    Musculoskeletal:  See HPI    Neurological: Negative for numbness.    Psychiatric/Behavioral: Negative for depression.  The patient is not nervous/anxious.      Endocrine: Negative for polyuria    Hematologic/Lymphatic: Negative for bleeding problem.   Does not bruise/bleed easily.    Skin: Negative for poor would healing and rash      Physical Examination:    Vital Signs:    Vitals:    11/17/22 1018   BP: (!) 163/84   Pulse: 66       Body mass index is 40.18 kg/m².    General: No acute distress, alert and oriented, healthy appearing    HEENT: Head is atraumatic, mucous membranes are moist    Neck: Supples, no JVD    Cardiovascular: Palpable dorsalis pedis and posterior tibial pulses, regular rate and rhythm to those pulses    Lungs: Breathing non-labored    Skin: no rashes appreciated    Neurologic: Can flex and extend knees, ankles, and toes. Sensation is grossly intact    Right knee:  Patient with a 2 to 3+ effusion.  He has brisk capillary refill distally.  Sensation intact distally.  Range of motion 0-95    X-rays:  Three views of the right knee reviewed.  Patient's implants well fixed.     Assessment::  Status post right total knee arthroplasty with postoperative hematoma    Plan:  Patient with hematoma likely due to his blood thinners.  We will hold off on aspiration at this point continue swelling elevation and ice.  We will see him back in 3 weeks to check his range of motion.  We did discuss the possibility of a manipulation if he continues to be stiff.    This note was created using [employer] voice recognition software that occasionally misinterpreted phrases or words.    Consult note is delivered via Epic messaging service.

## 2022-12-05 ENCOUNTER — TELEPHONE (OUTPATIENT)
Dept: ORTHOPEDICS | Facility: CLINIC | Age: 77
End: 2022-12-05
Payer: MEDICARE

## 2022-12-05 NOTE — TELEPHONE ENCOUNTER
Patient wife called stating that she believes he has staph on the back of the effect leg.   She will send me a picture of it to my email.

## 2022-12-06 ENCOUNTER — OFFICE VISIT (OUTPATIENT)
Dept: ORTHOPEDICS | Facility: CLINIC | Age: 77
End: 2022-12-06
Payer: MEDICARE

## 2022-12-06 VITALS
HEIGHT: 70 IN | BODY MASS INDEX: 39.8 KG/M2 | DIASTOLIC BLOOD PRESSURE: 67 MMHG | WEIGHT: 278 LBS | SYSTOLIC BLOOD PRESSURE: 147 MMHG | HEART RATE: 57 BPM

## 2022-12-06 DIAGNOSIS — Z96.651 AFTERCARE FOLLOWING RIGHT KNEE JOINT REPLACEMENT SURGERY: Primary | ICD-10-CM

## 2022-12-06 DIAGNOSIS — Z47.1 AFTERCARE FOLLOWING RIGHT KNEE JOINT REPLACEMENT SURGERY: Primary | ICD-10-CM

## 2022-12-06 PROCEDURE — 20610 LARGE JOINT ASPIRATION/INJECTION: R KNEE: ICD-10-PCS | Mod: 79,RT,, | Performed by: ORTHOPAEDIC SURGERY

## 2022-12-06 PROCEDURE — 20610 DRAIN/INJ JOINT/BURSA W/O US: CPT | Mod: 79,RT,, | Performed by: ORTHOPAEDIC SURGERY

## 2022-12-06 PROCEDURE — 99024 PR POST-OP FOLLOW-UP VISIT: ICD-10-PCS | Mod: POP,,, | Performed by: ORTHOPAEDIC SURGERY

## 2022-12-06 PROCEDURE — 99024 POSTOP FOLLOW-UP VISIT: CPT | Mod: POP,,, | Performed by: ORTHOPAEDIC SURGERY

## 2022-12-06 RX ORDER — CEPHALEXIN 500 MG/1
500 CAPSULE ORAL EVERY 6 HOURS
Qty: 56 CAPSULE | Refills: 0 | Status: SHIPPED | OUTPATIENT
Start: 2022-12-06 | End: 2022-12-20

## 2022-12-06 RX ORDER — LIDOCAINE HYDROCHLORIDE 20 MG/ML
5 INJECTION, SOLUTION EPIDURAL; INFILTRATION; INTRACAUDAL; PERINEURAL
Status: DISCONTINUED | OUTPATIENT
Start: 2022-12-06 | End: 2022-12-06 | Stop reason: HOSPADM

## 2022-12-06 RX ORDER — BETAMETHASONE SODIUM PHOSPHATE AND BETAMETHASONE ACETATE 3; 3 MG/ML; MG/ML
12 INJECTION, SUSPENSION INTRA-ARTICULAR; INTRALESIONAL; INTRAMUSCULAR; SOFT TISSUE
Status: DISCONTINUED | OUTPATIENT
Start: 2022-12-06 | End: 2022-12-06 | Stop reason: HOSPADM

## 2022-12-06 RX ADMIN — BETAMETHASONE SODIUM PHOSPHATE AND BETAMETHASONE ACETATE 12 MG: 3; 3 INJECTION, SUSPENSION INTRA-ARTICULAR; INTRALESIONAL; INTRAMUSCULAR; SOFT TISSUE at 01:12

## 2022-12-06 RX ADMIN — LIDOCAINE HYDROCHLORIDE 5 ML: 20 INJECTION, SOLUTION EPIDURAL; INFILTRATION; INTRACAUDAL; PERINEURAL at 01:12

## 2022-12-06 NOTE — PROGRESS NOTES
Past Medical History:   Diagnosis Date    Anemia, unspecified     Arthritis     Cancer     Lips    COPD (chronic obstructive pulmonary disease)     Elevated cholesterol     Hypertension     Sleep apnea     Thrombosis     Lungs       Past Surgical History:   Procedure Laterality Date    gastric sleeve      kidney stones      ROBOTIC ARTHROPLASTY, KNEE Right 10/10/2022    Procedure: ROBOTIC ARTHROPLASTY, KNEE, TOTAL;  Surgeon: Omer Ibrahim MD;  Location: [facility];  Service: Orthopedics;  Laterality: Right;    TOTAL SHOULDER ARTHROPLASTY Right 2018    Dr. Ceja       Current Outpatient Medications   Medication Sig    allopurinoL (ZYLOPRIM) 300 MG tablet Take 300 mg by mouth once daily.    amlodipine-benazepril 5-20 mg (LOTREL) 5-20 mg per capsule Take 1 capsule by mouth once daily.    atorvastatin (LIPITOR) 40 MG tablet Take 40 mg by mouth once daily.    carvediloL (COREG) 12.5 MG tablet Take 12.5 mg by mouth 2 (two) times daily with meals.    cetirizine (ZYRTEC) 10 MG tablet Take 10 mg by mouth once daily.    furosemide (LASIX) 20 MG tablet Take 20 mg by mouth once daily.    meclizine (ANTIVERT) 25 mg tablet Take 25 mg by mouth once daily.    omega-3 acid ethyl esters (LOVAZA) 1 gram capsule Take 2 g by mouth 2 (two) times daily. Ran out    sertraline (ZOLOFT) 100 MG tablet Take 100 mg by mouth once daily. 1 tab in am then half tab at hs    tamsulosin (FLOMAX) 0.4 mg Cap Take 0.4 mg by mouth every evening.    tiotropium (SPIRIVA WITH HANDIHALER) 18 mcg inhalation capsule Inhale 18 mcg into the lungs once daily.    traMADoL (ULTRAM) 50 mg tablet Take 1 tablet (50 mg total) by mouth every 6 (six) hours as needed for Pain.    vitamin E 100 UNIT capsule Take 100 Units by mouth once daily.    aspirin (ECOTRIN) 81 MG EC tablet Take 1 tablet (81 mg total) by mouth every 12 (twelve) hours. Blood clot prevention     No current facility-administered medications for this visit.     Facility-Administered Medications Ordered  in Other Visits   Medication    hyaluronate (SYNVISC) 16 mg/2 mL injection 16 mg    LIDOcaine HCL 20 mg/ml (2%) injection 2 mL       Review of patient's allergies indicates:   Allergen Reactions    Sulfa (sulfonamide antibiotics)        Family History   Problem Relation Age of Onset    Cancer Mother     Diabetes Maternal Grandmother        Social History     Socioeconomic History    Marital status:    Tobacco Use    Smoking status: Never    Smokeless tobacco: Never   Substance and Sexual Activity    Alcohol use: Yes     Alcohol/week: 7.0 standard drinks     Types: 7 Cans of beer per week    Drug use: Never    Sexual activity: Yes     Partners: Female       Chief Complaint:   Chief Complaint   Patient presents with    Follow-up     Pt presents a swelling and an infection on the back side of his knee. Pt reports a soreness/tightness after his physical therapy. Pt has been applying Icy hot and using heating pads at night which helps to ease the pain. Pt is actually ambulating using a walker.        History of present illness:  77-year-old male presents today status post total knee arthroplasty.  Patient's postoperative course was complicated by a PE.  He is overall doing well.  Has no complaints today.  Two months out from surgery.  Complaining of left knee pain.      Review of Systems:    Constitution: Negative for chills, fever, and sweats.  Negative for unexplained weight loss.    HENT:  Negative for headaches and blurry vision.    Cardiovascular:Negative for chest pain or irregular heart beat. Negative for hypertension.    Respiratory:  Negative for cough and shortness of breath.    Gastrointestinal: Negative for abdominal pain, heartburn, melena, nausea, and vomitting.    Genitourinary:  Negative bladder incontinence and dysuria.    Musculoskeletal:  See HPI    Neurological: Negative for numbness.    Psychiatric/Behavioral: Negative for depression.  The patient is not nervous/anxious.      Endocrine:  Negative for polyuria    Hematologic/Lymphatic: Negative for bleeding problem.  Does not bruise/bleed easily.    Skin: Negative for poor would healing and rash      Physical Examination:    Vital Signs:    Vitals:    12/06/22 1312   BP: (!) 147/67   Pulse: (!) 57       Body mass index is 39.89 kg/m².    General: No acute distress, alert and oriented, healthy appearing    HEENT: Head is atraumatic, mucous membranes are moist    Neck: Supples, no JVD    Cardiovascular: Palpable dorsalis pedis and posterior tibial pulses, regular rate and rhythm to those pulses    Lungs: Breathing non-labored    Skin: no rashes appreciated    Neurologic: Can flex and extend knees, ankles, and toes. Sensation is grossly intact    Right knee:  Patient has full extension.  Flexion of 100°.  Stable to varus and valgus.  Minimal joint effusion    X-rays:      Assessment::  Status post right total knee arthroplasty  Left knee osteoarthritis    Plan:  Inject his left knee today.  Overall right knee is doing well.  He does have a small sore on the posterior aspect of his knee that is not related to his surgery.  Plan to give him some oral antibiotics for this.  He is also going to use some topical antibiotics for this as well.  We will see him back in 2 months.    This note was created using [employer] voice recognition software that occasionally misinterpreted phrases or words.    Consult note is delivered via Epic messaging service.

## 2022-12-06 NOTE — PROCEDURES
Large Joint Aspiration/Injection: R knee    Date/Time: 12/6/2022 1:00 PM  Performed by: Omer Ibrahim MD  Authorized by: Omer Ibrahim MD     Consent Done?:  Yes (Verbal)  Indications:  Arthritis  Timeout: prior to procedure the correct patient, procedure, and site was verified    Prep: patient was prepped and draped in usual sterile fashion      Details:  Needle Size:  22 G  Approach:  Anterolateral  Location:  Knee  Site:  R knee  Medications:  5 mL LIDOcaine (PF) 20 mg/mL (2%) 20 mg/mL (2 %); 12 mg betamethasone acetate-betamethasone sodium phosphate 6 mg/mL

## 2023-02-07 ENCOUNTER — OFFICE VISIT (OUTPATIENT)
Dept: ORTHOPEDICS | Facility: CLINIC | Age: 78
End: 2023-02-07
Payer: MEDICARE

## 2023-02-07 VITALS
WEIGHT: 278 LBS | HEIGHT: 70 IN | HEART RATE: 79 BPM | SYSTOLIC BLOOD PRESSURE: 139 MMHG | DIASTOLIC BLOOD PRESSURE: 89 MMHG | BODY MASS INDEX: 39.8 KG/M2

## 2023-02-07 DIAGNOSIS — M17.11 PRIMARY OSTEOARTHRITIS OF RIGHT KNEE: Primary | ICD-10-CM

## 2023-02-07 PROCEDURE — 99213 OFFICE O/P EST LOW 20 MIN: CPT | Mod: ,,, | Performed by: NURSE PRACTITIONER

## 2023-02-07 PROCEDURE — 99213 PR OFFICE/OUTPT VISIT, EST, LEVL III, 20-29 MIN: ICD-10-PCS | Mod: ,,, | Performed by: NURSE PRACTITIONER

## 2023-02-07 NOTE — PROGRESS NOTES
Chief Complaint:   Chief Complaint   Patient presents with    Right Knee - Follow-up     F/U for right TKA 10/10/22. Knee is doing ok. Still swollen and feels like there's pins and needles in it. In very mild pain, mainly on the back of knee. Mobility is good. No other issues with knee.       History of present illness: Camden Uriarte is a 77 y.o. male, presents to the clinic today over 1 year status post right total knee arthroplasty.  Overall the patient is doing very well with this.  No complaints of pain or discomfort to the knee today here in clinic.  Ambulating without any assistance.  Regards to the left knee he does have a known osteoarthritic changes.  He received a cortisone injection last time he was here.  Today pain is not necessarily bad enough for another injection.      Past Medical History:   Diagnosis Date    Anemia, unspecified     Arthritis     Cancer     Lips    COPD (chronic obstructive pulmonary disease)     Elevated cholesterol     Hypertension     Sleep apnea     Thrombosis     Lungs       Past Surgical History:   Procedure Laterality Date    gastric sleeve      kidney stones      ROBOTIC ARTHROPLASTY, KNEE Right 10/10/2022    Procedure: ROBOTIC ARTHROPLASTY, KNEE, TOTAL;  Surgeon: Omer Ibrahim MD;  Location: [facility];  Service: Orthopedics;  Laterality: Right;    TOTAL SHOULDER ARTHROPLASTY Right 2018    Dr. Ceja       Current Outpatient Medications   Medication Sig    allopurinoL (ZYLOPRIM) 300 MG tablet Take 300 mg by mouth once daily.    amlodipine-benazepril 5-20 mg (LOTREL) 5-20 mg per capsule Take 1 capsule by mouth once daily.    aspirin (ECOTRIN) 81 MG EC tablet Take 1 tablet (81 mg total) by mouth every 12 (twelve) hours. Blood clot prevention    atorvastatin (LIPITOR) 40 MG tablet Take 40 mg by mouth once daily.    carvediloL (COREG) 12.5 MG tablet Take 12.5 mg by mouth 2 (two) times daily with meals.    cetirizine (ZYRTEC) 10 MG tablet Take 10 mg by mouth once daily.     furosemide (LASIX) 20 MG tablet Take 20 mg by mouth once daily.    meclizine (ANTIVERT) 25 mg tablet Take 25 mg by mouth once daily.    omega-3 acid ethyl esters (LOVAZA) 1 gram capsule Take 2 g by mouth 2 (two) times daily. Ran out    sertraline (ZOLOFT) 100 MG tablet Take 100 mg by mouth once daily. 1 tab in am then half tab at hs    tamsulosin (FLOMAX) 0.4 mg Cap Take 0.4 mg by mouth every evening.    tiotropium (SPIRIVA WITH HANDIHALER) 18 mcg inhalation capsule Inhale 18 mcg into the lungs once daily.    traMADoL (ULTRAM) 50 mg tablet Take 1 tablet (50 mg total) by mouth every 6 (six) hours as needed for Pain.    vitamin E 100 UNIT capsule Take 100 Units by mouth once daily.     No current facility-administered medications for this visit.     Facility-Administered Medications Ordered in Other Visits   Medication    hyaluronate (SYNVISC) 16 mg/2 mL injection 16 mg    LIDOcaine HCL 20 mg/ml (2%) injection 2 mL       Review of patient's allergies indicates:   Allergen Reactions    Sulfa (sulfonamide antibiotics)        Family History   Problem Relation Age of Onset    Cancer Mother     Diabetes Maternal Grandmother        Social History     Socioeconomic History    Marital status:    Tobacco Use    Smoking status: Never    Smokeless tobacco: Never   Substance and Sexual Activity    Alcohol use: Yes     Alcohol/week: 7.0 standard drinks     Types: 7 Cans of beer per week    Drug use: Never    Sexual activity: Yes     Partners: Female         Review of Systems:    Denies fevers, chills, chest pain, shortness of breath. Comprehensive review of systems performed and otherwise negative except as noted in HPI      Physical Examination:    General: awake and alert, no acute distress, healthy appearing  Head and Neck: Head atraumatic/normocephalic. Moist MM  CV: brisk cap refill  Lungs: non-labored breathing, w/o cough or SOB  Skin: no rashes present, warm to touch  Neuro: sensation grossly intact distall     Vital  Signs:    Vitals:    02/07/23 1255   BP: 139/89   Pulse: 79       Body mass index is 39.89 kg/m².    Examination right knee:    Incision clean dry and intact. No erythema or drainage or signs of infection.  Sensation intact distally to right foot  Positive FHL/EHL/gastrocsoleus/tib ant  Brisk capillary refill to right foot  No swelling or signs of DVT  Range of motion: extension at 0 and flexion at 115  Stable to varus valgus  Stable to anterior and posterior drawer       Assessment::post op status post R TKA & primary OA left knee    Plan:  Patient presents to clinic today for follow-up of right knee.  His knee is stable on examination.  Continues to have no problems with this.  Regards to the left knee no issues at this time.  Follow up on an as-needed basis for either knee whether it be injections or ready to schedule a left total knee arthroplasty.  Patient states understanding and agrees with plan of care.    The above findings, diagnostics, and treatment plan were discussed with Dr. Omer Ibrahim who is in agreement with the plan of care.      This note was created using [employer] voice recognition software that occasionally misinterpreted phrases or words.    Consult note is delivered via Epic messaging service.

## 2023-03-21 ENCOUNTER — HOSPITAL ENCOUNTER (OUTPATIENT)
Dept: RADIOLOGY | Facility: CLINIC | Age: 78
Discharge: HOME | End: 2023-03-21
Attending: ORTHOPAEDIC SURGERY
Payer: MEDICARE

## 2023-03-21 ENCOUNTER — OFFICE VISIT (OUTPATIENT)
Dept: ORTHOPEDICS | Facility: CLINIC | Age: 78
End: 2023-03-21
Payer: MEDICARE

## 2023-03-21 VITALS
SYSTOLIC BLOOD PRESSURE: 161 MMHG | BODY MASS INDEX: 39.8 KG/M2 | HEIGHT: 70 IN | HEART RATE: 67 BPM | DIASTOLIC BLOOD PRESSURE: 83 MMHG | WEIGHT: 278 LBS

## 2023-03-21 DIAGNOSIS — M75.100 TEAR OF ROTATOR CUFF, UNSPECIFIED LATERALITY, UNSPECIFIED TEAR EXTENT, UNSPECIFIED WHETHER TRAUMATIC: ICD-10-CM

## 2023-03-21 DIAGNOSIS — M25.511 RIGHT SHOULDER PAIN, UNSPECIFIED CHRONICITY: Primary | ICD-10-CM

## 2023-03-21 DIAGNOSIS — M17.11 PRIMARY OSTEOARTHRITIS OF RIGHT KNEE: ICD-10-CM

## 2023-03-21 DIAGNOSIS — M25.511 RIGHT SHOULDER PAIN, UNSPECIFIED CHRONICITY: ICD-10-CM

## 2023-03-21 PROCEDURE — 99213 PR OFFICE/OUTPT VISIT, EST, LEVL III, 20-29 MIN: ICD-10-PCS | Mod: ,,, | Performed by: ORTHOPAEDIC SURGERY

## 2023-03-21 PROCEDURE — 73030 XR SHOULDER COMPLETE 2 OR MORE VIEWS RIGHT: ICD-10-PCS | Mod: RT,,, | Performed by: ORTHOPAEDIC SURGERY

## 2023-03-21 PROCEDURE — 99213 OFFICE O/P EST LOW 20 MIN: CPT | Mod: ,,, | Performed by: ORTHOPAEDIC SURGERY

## 2023-03-21 PROCEDURE — 73030 X-RAY EXAM OF SHOULDER: CPT | Mod: RT,,, | Performed by: ORTHOPAEDIC SURGERY

## 2023-03-21 NOTE — PROGRESS NOTES
Chief Complaint:   Chief Complaint   Patient presents with    Right Knee - Follow-up     F/U for right TKA. Knee is doing ok. Still swollen and feels like pins and needles are sticking him. Mobility and ROM is ok. Gets stiff when he sits down too long. No other issues with it.    Right Shoulder - Pain     Right shoulder pain. Been hurting for a couple of weeks. Been applying Ice-hot on it for the last week. Has limited ROM. Wakes him up at night sometimes. Hurts when he reaches for something. Shocking pain.       History of present illness:  67-year-old male presents today for evaluation and follow-up of total knee arthroplasty on the right side.  Patient is about 6 months out from his knee is doing well.  Does have some swelling on occasion although he is continues to do physical therapy.  We will place actually right shoulder.  Has a history of total shoulder performed 5 years ago.  Has weakness and pain range of motion to his right shoulder.    Past Medical History:   Diagnosis Date    Anemia, unspecified     Arthritis     Cancer     Lips    COPD (chronic obstructive pulmonary disease)     Elevated cholesterol     Hypertension     Sleep apnea     Thrombosis     Lungs       Past Surgical History:   Procedure Laterality Date    gastric sleeve      kidney stones      ROBOTIC ARTHROPLASTY, KNEE Right 10/10/2022    Procedure: ROBOTIC ARTHROPLASTY, KNEE, TOTAL;  Surgeon: Omer Ibrahim MD;  Location: [facility];  Service: Orthopedics;  Laterality: Right;    TOTAL SHOULDER ARTHROPLASTY Right 2018    Dr. Ceja       Current Outpatient Medications   Medication Sig    allopurinoL (ZYLOPRIM) 300 MG tablet Take 300 mg by mouth once daily.    amlodipine-benazepril 5-20 mg (LOTREL) 5-20 mg per capsule Take 1 capsule by mouth once daily.    aspirin (ECOTRIN) 81 MG EC tablet Take 1 tablet (81 mg total) by mouth every 12 (twelve) hours. Blood clot prevention    atorvastatin (LIPITOR) 40 MG tablet Take 40 mg by mouth once  daily.    carvediloL (COREG) 12.5 MG tablet Take 12.5 mg by mouth 2 (two) times daily with meals.    cetirizine (ZYRTEC) 10 MG tablet Take 10 mg by mouth once daily.    furosemide (LASIX) 20 MG tablet Take 20 mg by mouth once daily.    meclizine (ANTIVERT) 25 mg tablet Take 25 mg by mouth once daily.    omega-3 acid ethyl esters (LOVAZA) 1 gram capsule Take 2 g by mouth 2 (two) times daily. Ran out    sertraline (ZOLOFT) 100 MG tablet Take 100 mg by mouth once daily. 1 tab in am then half tab at hs    tamsulosin (FLOMAX) 0.4 mg Cap Take 0.4 mg by mouth every evening.    tiotropium (SPIRIVA WITH HANDIHALER) 18 mcg inhalation capsule Inhale 18 mcg into the lungs once daily.    traMADoL (ULTRAM) 50 mg tablet Take 1 tablet (50 mg total) by mouth every 6 (six) hours as needed for Pain.    vitamin E 100 UNIT capsule Take 100 Units by mouth once daily.     No current facility-administered medications for this visit.     Facility-Administered Medications Ordered in Other Visits   Medication    hyaluronate (SYNVISC) 16 mg/2 mL injection 16 mg    LIDOcaine HCL 20 mg/ml (2%) injection 2 mL       Review of patient's allergies indicates:   Allergen Reactions    Sulfa (sulfonamide antibiotics)        Family History   Problem Relation Age of Onset    Cancer Mother     Diabetes Maternal Grandmother        Social History     Socioeconomic History    Marital status:    Tobacco Use    Smoking status: Never    Smokeless tobacco: Never   Substance and Sexual Activity    Alcohol use: Yes     Alcohol/week: 7.0 standard drinks     Types: 7 Cans of beer per week    Drug use: Never    Sexual activity: Yes     Partners: Female           Review of Systems:    Constitution: Negative for chills, fever, and sweats.  Negative for unexplained weight loss.    HENT:  Negative for headaches and blurry vision.    Cardiovascular:Negative for chest pain or irregular heart beat. Negative for hypertension.    Respiratory:  Negative for cough and  shortness of breath.    Gastrointestinal: Negative for abdominal pain, heartburn, melena, nausea, and vomitting.    Genitourinary:  Negative bladder incontinence and dysuria.    Musculoskeletal:  See HPI    Neurological: Negative for numbness.    Psychiatric/Behavioral: Negative for depression.  The patient is not nervous/anxious.      Endocrine: Negative for polyuria    Hematologic/Lymphatic: Negative for bleeding problem.  Does not bruise/bleed easily.    Skin: Negative for poor would healing and rash      Physical Examination:    Vital Signs:    Vitals:    03/21/23 1544   BP: (!) 161/83   Pulse: 67       Body mass index is 39.89 kg/m².    General: No acute distress, alert and oriented, healthy appearing    HEENT: Head is atraumatic, mucous membranes are moist    Neck: Supples, no JVD    Cardiovascular: Palpable dorsalis pedis and posterior tibial pulses, regular rate and rhythm to those pulses    Lungs: Breathing non-labored    Skin: no rashes appreciated    Neurologic: Can flex and extend knees, ankles, and toes. Sensation is grossly intact    Right knee:  Range of motion of the right knee from 0-120.  Stable to varus and valgus.  Stable to anterior-posterior drawer.    Right shoulder:  Patient has passive range of motion is well-maintained.  Significant weakness to rotator cuff musculature.    X-rays:  Three views right shoulder reviewed.  Patient with well-fixed prosthesis.  No significant signs of wear.  No significant or severe superior migration     Assessment::  Aftercare following right total knee arthroplasty  Painful total shoulder on the right side possible rotator cuff tear    Plan:  Patient's knee is doing well.  His recovery therapy we will see him back in 6 months for his knee.  With regards to his shoulder, he may have torn his rotator cuff around his total shoulder arthroplasty.  I discussed sending him to physical therapy.  He would like to go see Dr. Akbar again to get his opinion.    This note  was created using [employer] voice recognition software that occasionally misinterpreted phrases or words.    Consult note is delivered via Epic messaging service.

## 2023-04-19 ENCOUNTER — OFFICE VISIT (OUTPATIENT)
Dept: ORTHOPEDICS | Facility: CLINIC | Age: 78
End: 2023-04-19
Payer: MEDICARE

## 2023-04-19 VITALS
BODY MASS INDEX: 39.8 KG/M2 | HEIGHT: 70 IN | WEIGHT: 278 LBS | HEART RATE: 66 BPM | SYSTOLIC BLOOD PRESSURE: 131 MMHG | DIASTOLIC BLOOD PRESSURE: 74 MMHG

## 2023-04-19 DIAGNOSIS — M75.81 ROTATOR CUFF TENDONITIS, RIGHT: Primary | ICD-10-CM

## 2023-04-19 PROCEDURE — 99214 OFFICE O/P EST MOD 30 MIN: CPT | Mod: 25,,, | Performed by: SPECIALIST

## 2023-04-19 PROCEDURE — 20610 DRAIN/INJ JOINT/BURSA W/O US: CPT | Mod: RT,,, | Performed by: SPECIALIST

## 2023-04-19 PROCEDURE — 20610 LARGE JOINT ASPIRATION/INJECTION: R SUBACROMIAL BURSA: ICD-10-PCS | Mod: RT,,, | Performed by: SPECIALIST

## 2023-04-19 PROCEDURE — 99214 PR OFFICE/OUTPT VISIT, EST, LEVL IV, 30-39 MIN: ICD-10-PCS | Mod: 25,,, | Performed by: SPECIALIST

## 2023-04-19 RX ADMIN — BETAMETHASONE SODIUM PHOSPHATE AND BETAMETHASONE ACETATE 12 MG: 3; 3 INJECTION, SUSPENSION INTRA-ARTICULAR; INTRALESIONAL; INTRAMUSCULAR; SOFT TISSUE at 08:04

## 2023-04-19 RX ADMIN — LIDOCAINE HYDROCHLORIDE 5 ML: 20 INJECTION, SOLUTION EPIDURAL; INFILTRATION; INTRACAUDAL; PERINEURAL at 08:04

## 2023-04-19 NOTE — PROCEDURES
Large Joint Aspiration/Injection: R subacromial bursa    Date/Time: 4/19/2023 8:00 AM  Performed by: Chi Ceja MD  Authorized by: Chi Ceja MD     Consent Done?:  Yes (Verbal)  Indications:  Pain  Timeout: prior to procedure the correct patient, procedure, and site was verified      Local anesthesia used?: Yes    Anesthesia:  Local infiltration  Local anesthetic:  Lidocaine 2% without epinephrine    Details:  Needle Size:  25 G  Ultrasonic Guidance for needle placement?: No    Approach:  Posterior  Location:  Shoulder  Site:  R subacromial bursa  Patient tolerance:  Patient tolerated the procedure well with no immediate complications

## 2023-04-19 NOTE — PROGRESS NOTES
Chief Complaint   Patient presents with    Shoulder Pain     R shoulder pain hx of a tsr in 2018 states pain gradually started. states no injury/fall. has been taking tylenol. has good rom unless he moves it         History of present illness:    This is a 77 y.o. year old male who complains of pain in his right shoulder ,hx of TSR in 2018  Pain getting WORST Pain left Shoulder laterally ° No fever ° No chills   Pain in the left shoulder in the subacromial region  In the supraspinatus region  When actively and passively moved   Started gradually  Slowly worsens with extended activity  Worsens at night  Causing an inability to sleep  Causes difficulty lying on affected side  Feels better after rest  Not relieved by medication  Shoulder joint stiffness on the left  joint stiffness  ° No radicular arm pain ° No left sternoclavicular joint pain ° No left shoulder joint swelling ° No pain in the acromioclavicular  ° No tingling of the left shoulder ° No left shoulder numbness   Range of motion was abnormal difficulty reaching over head using left arm  Range of motion was abnormal difficulty combing hair using left arm  Range of motion was abnormal difficulty taking shirt on/off using left arm  ° No neck pain on left Precervical pain ° Not in the trapezius Trapezius pain  Pain is significantly effecting quality of life       Past Medical History:   Diagnosis Date    Anemia, unspecified     Arthritis     Cancer     Lips    COPD (chronic obstructive pulmonary disease)     Elevated cholesterol     Hypertension     Sleep apnea     Thrombosis     Lungs       Past Surgical History:   Procedure Laterality Date    gastric sleeve      kidney stones      ROBOTIC ARTHROPLASTY, KNEE Right 10/10/2022    Procedure: ROBOTIC ARTHROPLASTY, KNEE, TOTAL;  Surgeon: Omer Ibrahim MD;  Location: [facility];  Service: Orthopedics;  Laterality: Right;    TOTAL SHOULDER ARTHROPLASTY Right 2018    Dr. Ceja       Current Outpatient Medications  "  Medication Instructions    allopurinoL (ZYLOPRIM) 300 mg, Oral, Daily    amlodipine-benazepril 5-20 mg (LOTREL) 5-20 mg per capsule 1 capsule, Oral, Daily    aspirin (ECOTRIN) 81 mg, Oral, Every 12 hours, Blood clot prevention    atorvastatin (LIPITOR) 40 mg, Oral, Daily    carvediloL (COREG) 12.5 mg, Oral, 2 times daily with meals    cetirizine (ZYRTEC) 10 mg, Oral, Daily    furosemide (LASIX) 20 mg, Oral, Daily    meclizine (ANTIVERT) 25 mg, Oral, Daily    omega-3 acid ethyl esters (LOVAZA) 2 g, Oral, 2 times daily, Ran out    sertraline (ZOLOFT) 100 mg, Oral, Daily, 1 tab in am then half tab at hs    SPIRIVA WITH HANDIHALER 18 mcg, Inhalation, Daily    tamsulosin (FLOMAX) 0.4 mg, Oral, Nightly    traMADoL (ULTRAM) 50 mg, Oral, Every 6 hours PRN    vitamin E 100 Units, Oral, Daily        Review of patient's allergies indicates:   Allergen Reactions    Sulfa (sulfonamide antibiotics)        Family History   Problem Relation Age of Onset    Cancer Mother     Diabetes Maternal Grandmother            Review of Systems:    Constitution:   Denies chills, fever, and sweats.  HENT:   Denies headaches or blurry vision.  Cardiovascular:  Denies chest pain or irregular heart beat.  Respiratory:   Denies cough or shortness of breath.  Gastrointestinal:  Denies abdominal pain, nausea, or vomiting.  Musculoskeletal:   Denies muscle cramps.  Neurological:   Denies dizziness or focal weakness.  Psychiatric/Behavior: Normal mental status.  Hematology/Lymph:  Denies bleeding problem or easy bruising/bleeding.  Skin:    Denies rash or suspicious lesions.    Examination:    Vital Signs:    Vitals:    04/19/23 0806   BP: 131/74   Pulse: 66   Weight: 126.1 kg (278 lb)   Height: 5' 10" (1.778 m)       Body mass index is 39.89 kg/m².    Constitution:   Well-developed, well nourished patient in no acute distress.  Neurological:   Alert and oriented x 3 and cooperative to examination.     Psychiatric/Behavior: Normal mental " status.  Respiratory:   No shortness of breath.  Eyes:    Extraoccular muscles intact  Skin:    No scars, rash or suspicious lesions.    Musculoskeletal Exam :      PHYSICAL FINDING      Neck:    NO Pain radiating to the shoulder Precervical         Shoulder:   .   ° NO atrophy of the deltoid muscle   ° NO atrophy of the supraspinatus muscle   ° NO atrophy of the infraspinatus muscle         Right  Shoulder: .  Acromial tenderness on palpation.  Tenderness on palpation of the subacromial bursa.  Tenderness on palpation of the deltoid muscle.  Tenderness on palpation of the supraspinatus muscle.  Tenderness on palpation of the infraspinatus muscle.  Tenderness on palpation of the glenohumeral joint region.  Tenderness on palpation at the bicipital groove.  Tenderness on palpation of the trapezius muscle.  Subacromial crepitus on motion was noted.       Right Shoulder:      Shoulder Motion: Value Normal Range         Active forward flexion       170degrees   Active external rotation     35 degrees   Active internal rotation      30 degrees      ° NO swelling medial sternoclavicular.   ° NO swelling.   ° NO induration.   ° NO erythema.   ° NO long head biceps deformity.   ° NO winged scapula.   ° Motion was normal.   ° NO pain was elicited during a Neer impingement test.   ° NO pain was elicited during a [place]-Anmol impingement test.      Left Shoulder:      Shoulder Motion: Normal Range      Active forward flexion    165 degrees   Active external rotation  35 degrees   Active internal rotation   25 degrees       Clavicle:      General/bilateral: ° Acromioclavicular joint showed NO pain elicited by motion distal.      Right Clavicle:  Right sternoclavicular joint showed tenderness on palpation.  Right acromioclavicular joint showed tenderness on palpation.      Left Clavicle:  Left sternoclavicular joint showed tenderness on palpation.  Left acromioclavicular joint showed tenderness on palpation.          Neurological:       NO abnormalities were noted Sensibility intact distally on right.   ° Oriented to time, place, and person.      Sensation:   ° NO sensory exam abnormalities were noted Decreased median sensation.   ° NO sensory exam abnormalities were noted Decreased ulnar sensation.   ° NO sensory exam abnormalities were noted Decreased radial sensation.      Motor (Strength):   ° NO weakness of the right shoulder was observed.   ° NO weakness of the left shoulder was observed.      Skin:   ° NO ecchymosis on the shoulders left.   ° NO ecchymosis on the shoulders right.      Injury / Incision Site: °  scar.anterior       TESTS      Imaging:      X-Ray Shoulder: Complete, two or more views of the true AP of the glenohumeral joint were performed  3 views were taken of the right shoulder   Shoulder components are well seated no loosening       Assessment: There are no diagnoses linked to this encounter.    Plan:  return in one month ,rest ,injection       DISCLAIMER: This note may have been dictated using voice recognition software and may contain grammatical errors.     NOTE: Consult report sent to referring provider via [employer] EMR.

## 2023-05-01 RX ORDER — LIDOCAINE HYDROCHLORIDE 20 MG/ML
5 INJECTION, SOLUTION EPIDURAL; INFILTRATION; INTRACAUDAL; PERINEURAL
Status: SHIPPED | OUTPATIENT
Start: 2023-04-19

## 2023-05-01 RX ORDER — BETAMETHASONE SODIUM PHOSPHATE AND BETAMETHASONE ACETATE 3; 3 MG/ML; MG/ML
12 INJECTION, SUSPENSION INTRA-ARTICULAR; INTRALESIONAL; INTRAMUSCULAR; SOFT TISSUE
Status: SHIPPED | OUTPATIENT
Start: 2023-04-19

## 2023-05-01 NOTE — PROCEDURES
Large Joint Aspiration/Injection: R subacromial bursa    Date/Time: 4/19/2023 8:00 AM  Performed by: Chi Ceja MD  Authorized by: hCi Ceja MD     Consent Done?:  Yes (Verbal)  Indications:  Pain  Timeout: prior to procedure the correct patient, procedure, and site was verified      Local anesthesia used?: Yes    Anesthesia:  Local infiltration  Local anesthetic:  Lidocaine 2% without epinephrine    Details:  Needle Size:  25 G  Ultrasonic Guidance for needle placement?: No    Approach:  Posterior  Location:  Shoulder  Site:  R subacromial bursa  Medications:  5 mL LIDOcaine (PF) 20 mg/mL (2%) 20 mg/mL (2 %); 12 mg betamethasone acetate-betamethasone sodium phosphate 6 mg/mL  Patient tolerance:  Patient tolerated the procedure well with no immediate complications

## 2023-05-02 NOTE — PROCEDURES
Large Joint Aspiration/Injection: R subacromial bursa    Date/Time: 4/19/2023 8:00 AM  Performed by: Chi Ceja MD  Authorized by: Chi Ceja MD     Consent Done?:  Yes (Verbal)  Indications:  Pain  Timeout: prior to procedure the correct patient, procedure, and site was verified      Local anesthesia used?: Yes    Anesthesia:  Local infiltration  Local anesthetic:  Lidocaine 2% without epinephrine    Details:  Needle Size:  25 G  Ultrasonic Guidance for needle placement?: No    Approach:  Posterior  Location:  Shoulder  Site:  R subacromial bursa  Medications:  5 mL LIDOcaine (PF) 20 mg/mL (2%) 20 mg/mL (2 %); 12 mg betamethasone acetate-betamethasone sodium phosphate 6 mg/mL  Patient tolerance:  Patient tolerated the procedure well with no immediate complications

## 2023-05-03 RX ORDER — BETAMETHASONE SODIUM PHOSPHATE AND BETAMETHASONE ACETATE 3; 3 MG/ML; MG/ML
12 INJECTION, SUSPENSION INTRA-ARTICULAR; INTRALESIONAL; INTRAMUSCULAR; SOFT TISSUE
Status: SHIPPED | OUTPATIENT
Start: 2023-04-19

## 2023-05-03 RX ORDER — LIDOCAINE HYDROCHLORIDE 20 MG/ML
5 INJECTION, SOLUTION EPIDURAL; INFILTRATION; INTRACAUDAL; PERINEURAL
Status: SHIPPED | OUTPATIENT
Start: 2023-04-19

## 2023-08-21 ENCOUNTER — OFFICE VISIT (OUTPATIENT)
Dept: ORTHOPEDICS | Facility: CLINIC | Age: 78
End: 2023-08-21
Payer: MEDICARE

## 2023-08-21 VITALS
HEIGHT: 70 IN | BODY MASS INDEX: 39.89 KG/M2 | HEART RATE: 56 BPM | DIASTOLIC BLOOD PRESSURE: 75 MMHG | SYSTOLIC BLOOD PRESSURE: 143 MMHG

## 2023-08-21 DIAGNOSIS — M75.21 TENDONITIS OF UPPER BICEPS TENDON OF RIGHT SHOULDER: Primary | ICD-10-CM

## 2023-08-21 PROCEDURE — 99213 PR OFFICE/OUTPT VISIT, EST, LEVL III, 20-29 MIN: ICD-10-PCS | Mod: ,,, | Performed by: PHYSICIAN ASSISTANT

## 2023-08-21 PROCEDURE — 99213 OFFICE O/P EST LOW 20 MIN: CPT | Mod: ,,, | Performed by: PHYSICIAN ASSISTANT

## 2023-08-21 NOTE — PROGRESS NOTES
Chief Complaint:   Chief Complaint   Patient presents with    Shoulder Pain     R shoulder injection . last injection was 04/2023 with some relief. states his pain feels like its more tucked in the axillary area. able to reach only at shoulder length        History of present illness:    This is a 77 y.o. year old male who complains of right anterior shoulder pain.    Patient did have a previous right shoulder replacement 2018 and received an injection in the subacromial space or rotator cuff tendinitis back in April of this year.    Patient states the shot gave very little relief he is having difficulty reaching out for form him for objects.    He points to the front of his shoulder is the source of his pain.    Denies any specific trauma injury or falls      Current Outpatient Medications   Medication Sig    allopurinoL (ZYLOPRIM) 300 MG tablet Take 300 mg by mouth once daily.    amlodipine-benazepril 5-20 mg (LOTREL) 5-20 mg per capsule Take 1 capsule by mouth once daily.    atorvastatin (LIPITOR) 40 MG tablet Take 40 mg by mouth once daily.    carvediloL (COREG) 12.5 MG tablet Take 12.5 mg by mouth 2 (two) times daily with meals.    cetirizine (ZYRTEC) 10 MG tablet Take 10 mg by mouth once daily.    furosemide (LASIX) 20 MG tablet Take 20 mg by mouth once daily.    meclizine (ANTIVERT) 25 mg tablet Take 25 mg by mouth once daily.    omega-3 acid ethyl esters (LOVAZA) 1 gram capsule Take 2 g by mouth 2 (two) times daily. Ran out    sertraline (ZOLOFT) 100 MG tablet Take 100 mg by mouth once daily. 1 tab in am then half tab at hs    tamsulosin (FLOMAX) 0.4 mg Cap Take 0.4 mg by mouth every evening.    tiotropium (SPIRIVA WITH HANDIHALER) 18 mcg inhalation capsule Inhale 18 mcg into the lungs once daily.    vitamin E 100 UNIT capsule Take 100 Units by mouth once daily.    aspirin (ECOTRIN) 81 MG EC tablet Take 1 tablet (81 mg total) by mouth every 12 (twelve) hours. Blood clot prevention    traMADoL (ULTRAM) 50 mg  "tablet Take 1 tablet (50 mg total) by mouth every 6 (six) hours as needed for Pain. (Patient not taking: Reported on 8/21/2023)     No current facility-administered medications for this visit.     Facility-Administered Medications Ordered in Other Visits   Medication    betamethasone acetate-betamethasone sodium phosphate injection 12 mg    betamethasone acetate-betamethasone sodium phosphate injection 12 mg    hyaluronate (SYNVISC) 16 mg/2 mL injection 16 mg    LIDOcaine (PF) 20 mg/mL (2%) injection 5 mL    LIDOcaine (PF) 20 mg/mL (2%) injection 5 mL    LIDOcaine HCL 20 mg/ml (2%) injection 2 mL       Review of Systems:    Constitution:   Denies chills, fever, and sweats.  HENT:   Denies headaches or blurry vision.  Cardiovascular:  Denies chest pain or irregular heart beat.  Respiratory:   Denies cough or shortness of breath.  Gastrointestinal:  Denies abdominal pain, nausea, or vomiting.  Musculoskeletal:   Denies muscle cramps.  Neurological:   Denies dizziness or focal weakness.  Psychiatric/Behavior: Normal mental status.  Hematology/Lymph:  Denies bleeding problem or easy bruising/bleeding.  Skin:    Denies rash or suspicious lesions.    Examination:    Vital Signs:    Vitals:    08/21/23 1249   BP: (!) 143/75   Pulse: (!) 56   Weight: (P) 126.1 kg (278 lb)   Height: 5' 10" (1.778 m)       Body mass index is 39.89 kg/m² (pended).    Constitution:   Well-developed, well nourished patient in no acute distress.  Neurological:   Alert and oriented x 3 and cooperative to examination.     Psychiatric/Behavior: Normal mental status.  Respiratory:   No shortness of breath.  Eyes:    Extraoccular muscles intact  Skin:    No scars, rash or suspicious lesions.    Physical Exam:   Right Shoulder:     No swelling, erythema or increased heat    No tenderness over deltoid, supraspinatus and infraspinatus    Positive tenderness over bicipital groove    Negative drop arm test     Negative Neer and Boston impingement " signs    Pain and weakness with rotator cuff resistance   Active shoulder abduction 70  Active forward elevation 160  Active internal rotation 40   Active external rotation 50          Assessment: Tendonitis of upper biceps tendon of right shoulder         Plan:  This point it appears the patient might be having proximal biceps tendinitis bilateral have him follow up with Dr. Ceja for more definitive physical assessment          DISCLAIMER: This note may have been dictated using voice recognition software and may contain grammatical errors.     NOTE: Consult report sent to referring provider via [employer] ([employer] EMR.

## 2023-08-22 ENCOUNTER — HOSPITAL ENCOUNTER (OUTPATIENT)
Dept: RADIOLOGY | Facility: CLINIC | Age: 78
Discharge: HOME | End: 2023-08-22
Attending: SPECIALIST
Payer: MEDICARE

## 2023-08-22 ENCOUNTER — OFFICE VISIT (OUTPATIENT)
Dept: ORTHOPEDICS | Facility: CLINIC | Age: 78
End: 2023-08-22
Payer: MEDICARE

## 2023-08-22 VITALS — BODY MASS INDEX: 39.8 KG/M2 | WEIGHT: 278 LBS | HEIGHT: 70 IN

## 2023-08-22 DIAGNOSIS — R52 PAIN: ICD-10-CM

## 2023-08-22 DIAGNOSIS — M75.121 NONTRAUMATIC COMPLETE TEAR OF RIGHT ROTATOR CUFF: Primary | ICD-10-CM

## 2023-08-22 DIAGNOSIS — Z96.611 HISTORY OF TOTAL REPLACEMENT OF RIGHT SHOULDER JOINT: ICD-10-CM

## 2023-08-22 DIAGNOSIS — M25.511 RIGHT SHOULDER PAIN, UNSPECIFIED CHRONICITY: ICD-10-CM

## 2023-08-22 PROCEDURE — 99214 PR OFFICE/OUTPT VISIT, EST, LEVL IV, 30-39 MIN: ICD-10-PCS | Mod: ,,, | Performed by: SPECIALIST

## 2023-08-22 PROCEDURE — 99214 OFFICE O/P EST MOD 30 MIN: CPT | Mod: ,,, | Performed by: SPECIALIST

## 2023-08-22 PROCEDURE — 73030 X-RAY EXAM OF SHOULDER: CPT | Mod: RT,,, | Performed by: SPECIALIST

## 2023-08-22 PROCEDURE — 73030 XR SHOULDER COMPLETE 2 OR MORE VIEWS RIGHT: ICD-10-PCS | Mod: RT,,, | Performed by: SPECIALIST

## 2023-08-22 NOTE — PROGRESS NOTES
Chief Complaint:   Chief Complaint   Patient presents with    Follow-up     Right shoulder inj 4/2023, spoke feng pierre yesterday, tendonitis of upper biceps tendon of right shoulder         History of present illness:    This is a 77 y.o. year old male who complains of right shoulder pain mainly in the front  Hat a TSR in 2018      Past Medical History:   Diagnosis Date    Anemia, unspecified     Arthritis     Cancer     Lips    COPD (chronic obstructive pulmonary disease)     Elevated cholesterol     Hypertension     Sleep apnea     Thrombosis     Lungs       Past Surgical History:   Procedure Laterality Date    gastric sleeve      kidney stones      ROBOTIC ARTHROPLASTY, KNEE Right 10/10/2022    Procedure: ROBOTIC ARTHROPLASTY, KNEE, TOTAL;  Surgeon: Omer Ibrahim MD;  Location: [facility];  Service: Orthopedics;  Laterality: Right;    TOTAL SHOULDER ARTHROPLASTY Right 2018    Dr. Ceja       Current Outpatient Medications   Medication Instructions    allopurinoL (ZYLOPRIM) 300 mg, Oral, Daily    amlodipine-benazepril 5-20 mg (LOTREL) 5-20 mg per capsule 1 capsule, Oral, Daily    aspirin (ECOTRIN) 81 mg, Oral, Every 12 hours, Blood clot prevention    atorvastatin (LIPITOR) 40 mg, Oral, Daily    carvediloL (COREG) 12.5 mg, Oral, 2 times daily with meals    cetirizine (ZYRTEC) 10 mg, Oral, Daily    furosemide (LASIX) 20 mg, Oral, Daily    meclizine (ANTIVERT) 25 mg, Oral, Daily    omega-3 acid ethyl esters (LOVAZA) 2 g, Oral, 2 times daily, Ran out    sertraline (ZOLOFT) 100 mg, Oral, Daily, 1 tab in am then half tab at hs    SPIRIVA WITH HANDIHALER 18 mcg, Inhalation, Daily    tamsulosin (FLOMAX) 0.4 mg, Oral, Nightly    traMADoL (ULTRAM) 50 mg, Oral, Every 6 hours PRN    vitamin E 100 Units, Oral, Daily        Review of patient's allergies indicates:   Allergen Reactions    Sulfa (sulfonamide antibiotics)        Family History   Problem Relation Age of Onset    Cancer Mother     Diabetes Maternal Grandmother   "          Review of Systems:    Constitution:   Denies chills, fever, and sweats.  HENT:   Denies headaches or blurry vision.  Cardiovascular:  Denies chest pain or irregular heart beat.  Respiratory:   Denies cough or shortness of breath.  Gastrointestinal:  Denies abdominal pain, nausea, or vomiting.  Musculoskeletal:   Denies muscle cramps.  Neurological:   Denies dizziness or focal weakness.  Psychiatric/Behavior: Normal mental status.  Hematology/Lymph:  Denies bleeding problem or easy bruising/bleeding.  Skin:    Denies rash or suspicious lesions.    Examination:    Vital Signs:    Vitals:    08/22/23 1054   Weight: 126.1 kg (278 lb)   Height: 5' 10" (1.778 m)       Body mass index is 39.89 kg/m².    Constitution:   Well-developed, well nourished patient in no acute distress.  Neurological:   Alert and oriented x 3 and cooperative to examination.     Psychiatric/Behavior: Normal mental status.  Respiratory:   No shortness of breath.  Eyes:    Extraoccular muscles intact  Skin:    No scars, rash or suspicious lesions.    Musculoskeletal Exam :   right Shoulder:     No swelling, erythema or increased heat    Tender over deltoid, supraspinatus and infraspinatus    Tender over bicipital groove    negative drop arm test     Positive Neer and Boston impingement signs    Pain and  weakness with rotator cuff resistance   Active shoulder abduction 70  Active forward elevation 160  Active internal rotation 40   Active external rotation 50        X-rays: superior migration of the humeral head component      Assessment: Nontraumatic complete tear of right rotator cuff    History of total replacement of right shoulder joint        Plan:  will refer him to Dr Andres Malcolm for eval for total shoulder conversion to reverse      DISCLAIMER: This note may have been dictated using voice recognition software and may contain grammatical errors.     NOTE: Consult report sent to referring provider via [employer] EMR.  "

## 2023-09-19 ENCOUNTER — OFFICE VISIT (OUTPATIENT)
Dept: ORTHOPEDICS | Facility: CLINIC | Age: 78
End: 2023-09-19
Payer: MEDICARE

## 2023-09-19 ENCOUNTER — HOSPITAL ENCOUNTER (OUTPATIENT)
Dept: RADIOLOGY | Facility: CLINIC | Age: 78
Discharge: HOME | End: 2023-09-19
Attending: ORTHOPAEDIC SURGERY
Payer: MEDICARE

## 2023-09-19 VITALS
HEIGHT: 70 IN | SYSTOLIC BLOOD PRESSURE: 130 MMHG | BODY MASS INDEX: 41.69 KG/M2 | DIASTOLIC BLOOD PRESSURE: 71 MMHG | HEART RATE: 58 BPM | TEMPERATURE: 97 F | WEIGHT: 291.19 LBS

## 2023-09-19 DIAGNOSIS — Z96.651 AFTERCARE FOLLOWING RIGHT KNEE JOINT REPLACEMENT SURGERY: Primary | ICD-10-CM

## 2023-09-19 DIAGNOSIS — Z47.1 AFTERCARE FOLLOWING RIGHT KNEE JOINT REPLACEMENT SURGERY: ICD-10-CM

## 2023-09-19 DIAGNOSIS — Z96.651 AFTERCARE FOLLOWING RIGHT KNEE JOINT REPLACEMENT SURGERY: ICD-10-CM

## 2023-09-19 DIAGNOSIS — Z47.1 AFTERCARE FOLLOWING RIGHT KNEE JOINT REPLACEMENT SURGERY: Primary | ICD-10-CM

## 2023-09-19 PROCEDURE — 99213 PR OFFICE/OUTPT VISIT, EST, LEVL III, 20-29 MIN: ICD-10-PCS | Mod: ,,, | Performed by: ORTHOPAEDIC SURGERY

## 2023-09-19 PROCEDURE — 73562 XR KNEE 3 VIEW RIGHT: ICD-10-PCS | Mod: RT,,, | Performed by: ORTHOPAEDIC SURGERY

## 2023-09-19 PROCEDURE — 99213 OFFICE O/P EST LOW 20 MIN: CPT | Mod: ,,, | Performed by: ORTHOPAEDIC SURGERY

## 2023-09-19 PROCEDURE — 73562 X-RAY EXAM OF KNEE 3: CPT | Mod: RT,,, | Performed by: ORTHOPAEDIC SURGERY

## 2023-09-19 NOTE — PROGRESS NOTES
Chief Complaint:   Chief Complaint   Patient presents with    Right Knee - Follow-up    Follow-up     F/U Right total knee replacement on 10/10/22.  Knee is doing good.         History of present illness:  77-year-old male presents today for follow-up after total arthroplasty.  Patient is roughly a year out.  Overall he is doing well.  Has no complaints of pain or other issues.  He is happy with his recovery following total arthroplasty on right side.    Past Medical History:   Diagnosis Date    Anemia, unspecified     Arthritis     Cancer     Lips    COPD (chronic obstructive pulmonary disease)     Elevated cholesterol     Hypertension     Sleep apnea     Thrombosis     Lungs       Past Surgical History:   Procedure Laterality Date    gastric sleeve      kidney stones      ROBOTIC ARTHROPLASTY, KNEE Right 10/10/2022    Procedure: ROBOTIC ARTHROPLASTY, KNEE, TOTAL;  Surgeon: Omer Ibrahim MD;  Location: [facility];  Service: Orthopedics;  Laterality: Right;    TOTAL SHOULDER ARTHROPLASTY Right 2018    Dr. Ceja       Current Outpatient Medications   Medication Sig    allopurinoL (ZYLOPRIM) 300 MG tablet Take 300 mg by mouth once daily.    amlodipine-benazepril 5-20 mg (LOTREL) 5-20 mg per capsule Take 1 capsule by mouth once daily.    atorvastatin (LIPITOR) 40 MG tablet Take 40 mg by mouth once daily.    carvediloL (COREG) 12.5 MG tablet Take 12.5 mg by mouth 2 (two) times daily with meals.    cetirizine (ZYRTEC) 10 MG tablet Take 10 mg by mouth once daily.    furosemide (LASIX) 20 MG tablet Take 20 mg by mouth once daily.    meclizine (ANTIVERT) 25 mg tablet Take 25 mg by mouth once daily.    omega-3 acid ethyl esters (LOVAZA) 1 gram capsule Take 2 g by mouth 2 (two) times daily. Ran out    sertraline (ZOLOFT) 100 MG tablet Take 100 mg by mouth once daily. 1 tab in am then half tab at hs    tamsulosin (FLOMAX) 0.4 mg Cap Take 0.4 mg by mouth every evening.    tiotropium (SPIRIVA WITH HANDIHALER) 18 mcg  inhalation capsule Inhale 18 mcg into the lungs once daily.    vitamin E 100 UNIT capsule Take 100 Units by mouth once daily.    aspirin (ECOTRIN) 81 MG EC tablet Take 1 tablet (81 mg total) by mouth every 12 (twelve) hours. Blood clot prevention    traMADoL (ULTRAM) 50 mg tablet Take 1 tablet (50 mg total) by mouth every 6 (six) hours as needed for Pain. (Patient not taking: Reported on 9/19/2023)     No current facility-administered medications for this visit.     Facility-Administered Medications Ordered in Other Visits   Medication    betamethasone acetate-betamethasone sodium phosphate injection 12 mg    betamethasone acetate-betamethasone sodium phosphate injection 12 mg    hyaluronate (SYNVISC) 16 mg/2 mL injection 16 mg    LIDOcaine (PF) 20 mg/mL (2%) injection 5 mL    LIDOcaine (PF) 20 mg/mL (2%) injection 5 mL    LIDOcaine HCL 20 mg/ml (2%) injection 2 mL       Review of patient's allergies indicates:   Allergen Reactions    Sulfa (sulfonamide antibiotics)        Family History   Problem Relation Age of Onset    Cancer Mother     Diabetes Maternal Grandmother        Social History     Socioeconomic History    Marital status:    Tobacco Use    Smoking status: Never    Smokeless tobacco: Never   Substance and Sexual Activity    Alcohol use: Yes     Alcohol/week: 7.0 standard drinks of alcohol     Types: 7 Cans of beer per week    Drug use: Never    Sexual activity: Yes     Partners: Female           Review of Systems:    Constitution: Negative for chills, fever, and sweats.  Negative for unexplained weight loss.    HENT:  Negative for headaches and blurry vision.    Cardiovascular:Negative for chest pain or irregular heart beat. Negative for hypertension.    Respiratory:  Negative for cough and shortness of breath.    Gastrointestinal: Negative for abdominal pain, heartburn, melena, nausea, and vomitting.    Genitourinary:  Negative bladder incontinence and dysuria.    Musculoskeletal:  See  HPI    Neurological: Negative for numbness.    Psychiatric/Behavioral: Negative for depression.  The patient is not nervous/anxious.      Endocrine: Negative for polyuria    Hematologic/Lymphatic: Negative for bleeding problem.  Does not bruise/bleed easily.    Skin: Negative for poor would healing and rash      Physical Examination:    Vital Signs:    Vitals:    09/19/23 0807   BP: 130/71   Pulse: (!) 58   Temp: 97.3 °F (36.3 °C)       Body mass index is 41.78 kg/m².    General: No acute distress, alert and oriented, healthy appearing    HEENT: Head is atraumatic, mucous membranes are moist    Neck: Supples, no JVD    Cardiovascular: Palpable dorsalis pedis and posterior tibial pulses, regular rate and rhythm to those pulses    Lungs: Breathing non-labored    Skin: no rashes appreciated    Neurologic: Can flex and extend knees, ankles, and toes. Sensation is grossly intact    Right knee:  Patient with full extension.  Excellent flexion greater than 115°.  Stable to varus and valgus.  Stable to anterior-posterior drawer    X-rays:  Three views right knee reviewed.  Patient's implants appear well we will fixed.  No signs of loosening or subsidence noted     Assessment::  Status post right total knee arthroplasty    Plan:  Overall doing quite well.  Patient is happy his recovery.  We will plan to see him back in a year.  Discussed current antibiotic prophylaxis recommendations.    This note was created using ENEF[employer] voice recognition software that occasionally misinterpreted phrases or words.    Consult note is delivered via Epic messaging service.

## 2024-05-10 ENCOUNTER — HOSPITAL ENCOUNTER (OUTPATIENT)
Dept: RADIOLOGY | Facility: CLINIC | Age: 79
Discharge: HOME | End: 2024-05-10
Attending: ORTHOPAEDIC SURGERY
Payer: MEDICARE

## 2024-05-10 ENCOUNTER — OFFICE VISIT (OUTPATIENT)
Dept: ORTHOPEDICS | Facility: CLINIC | Age: 79
End: 2024-05-10
Payer: MEDICARE

## 2024-05-10 VITALS
HEIGHT: 70 IN | BODY MASS INDEX: 41.7 KG/M2 | DIASTOLIC BLOOD PRESSURE: 73 MMHG | WEIGHT: 291.25 LBS | SYSTOLIC BLOOD PRESSURE: 143 MMHG | HEART RATE: 61 BPM

## 2024-05-10 DIAGNOSIS — M25.562 PAIN IN BOTH KNEES, UNSPECIFIED CHRONICITY: Primary | ICD-10-CM

## 2024-05-10 DIAGNOSIS — M25.561 PAIN IN BOTH KNEES, UNSPECIFIED CHRONICITY: Primary | ICD-10-CM

## 2024-05-10 DIAGNOSIS — M25.562 PAIN IN BOTH KNEES, UNSPECIFIED CHRONICITY: ICD-10-CM

## 2024-05-10 DIAGNOSIS — M25.561 PAIN IN BOTH KNEES, UNSPECIFIED CHRONICITY: ICD-10-CM

## 2024-05-10 DIAGNOSIS — M17.12 PRIMARY OSTEOARTHRITIS OF LEFT KNEE: ICD-10-CM

## 2024-05-10 PROCEDURE — 99214 OFFICE O/P EST MOD 30 MIN: CPT | Mod: ,,, | Performed by: ORTHOPAEDIC SURGERY

## 2024-05-10 PROCEDURE — 73564 X-RAY EXAM KNEE 4 OR MORE: CPT | Mod: 50,,, | Performed by: ORTHOPAEDIC SURGERY

## 2024-05-10 PROCEDURE — 20610 DRAIN/INJ JOINT/BURSA W/O US: CPT | Mod: LT,,, | Performed by: NURSE PRACTITIONER

## 2024-05-10 RX ORDER — LIDOCAINE HYDROCHLORIDE 20 MG/ML
5 INJECTION, SOLUTION EPIDURAL; INFILTRATION; INTRACAUDAL; PERINEURAL
Status: DISCONTINUED | OUTPATIENT
Start: 2024-05-10 | End: 2024-05-10 | Stop reason: HOSPADM

## 2024-05-10 RX ORDER — BETAMETHASONE SODIUM PHOSPHATE AND BETAMETHASONE ACETATE 3; 3 MG/ML; MG/ML
12 INJECTION, SUSPENSION INTRA-ARTICULAR; INTRALESIONAL; INTRAMUSCULAR; SOFT TISSUE
Status: DISCONTINUED | OUTPATIENT
Start: 2024-05-10 | End: 2024-05-10 | Stop reason: HOSPADM

## 2024-05-10 RX ADMIN — BETAMETHASONE SODIUM PHOSPHATE AND BETAMETHASONE ACETATE 12 MG: 3; 3 INJECTION, SUSPENSION INTRA-ARTICULAR; INTRALESIONAL; INTRAMUSCULAR; SOFT TISSUE at 10:05

## 2024-05-10 RX ADMIN — LIDOCAINE HYDROCHLORIDE 5 ML: 20 INJECTION, SOLUTION EPIDURAL; INFILTRATION; INTRACAUDAL; PERINEURAL at 10:05

## 2024-05-10 NOTE — PROGRESS NOTES
Chief Complaint:   Chief Complaint   Patient presents with    Pain     Right TKA sx 10/10/22, FRANKY knee pain, states pain started around beginning of the year, ambulating with cane, states will radiate down leg, states like a pricking pain, states had received gel injection in the past which helped with relief, has been doing therapy, has been falling a lot, has been taking tylenol to help with relief,        History of present illness:    History of Present Illness  The patient presents for evaluation of right knee pain. He is accompanied by an adult female.    The patient underwent a right knee replacement procedure in 2020. Recently, he has been experiencing back issues, for which he received injections this year. Subsequently, he experienced severe sciatic nerve pain that impeded his ability to walk. The injections have significantly alleviated his symptoms. He has been utilizing a TENS unit, which provides some relief. Currently, he is undergoing therapy. However, after cycling, he experiences pain in his right knee. He denies any instances of instability, catching, or locking in his right knee. Two weeks ago, he experienced an inability to forward flex his knee while walking, but backward movement is difficult. He describes a sensation akin to pricks in his knee. He has not previously tried using a brace on his right knee. He uses a cane for ambulation due to a lack of trust in his knee.    Supplemental Information  When he had the right knee replacement, he had blood clots in both lungs.    Past Medical History:   Diagnosis Date    Anemia, unspecified     Arthritis     Cancer     Lips    COPD (chronic obstructive pulmonary disease)     Elevated cholesterol     Hypertension     Sleep apnea     Thrombosis     Lungs       Past Surgical History:   Procedure Laterality Date    gastric sleeve      kidney stones      ROBOTIC ARTHROPLASTY, KNEE Right 10/10/2022    Procedure: ROBOTIC ARTHROPLASTY, KNEE, TOTAL;   Surgeon: Omer Ibrahim MD;  Location: [facility];  Service: Orthopedics;  Laterality: Right;    TOTAL SHOULDER ARTHROPLASTY Right 2018    Dr. Ceja       Current Outpatient Medications   Medication Sig    allopurinoL (ZYLOPRIM) 300 MG tablet Take 300 mg by mouth once daily.    amlodipine-benazepril 5-20 mg (LOTREL) 5-20 mg per capsule Take 1 capsule by mouth once daily.    atorvastatin (LIPITOR) 40 MG tablet Take 40 mg by mouth once daily.    carvediloL (COREG) 12.5 MG tablet Take 12.5 mg by mouth 2 (two) times daily with meals.    cetirizine (ZYRTEC) 10 MG tablet Take 10 mg by mouth once daily.    furosemide (LASIX) 20 MG tablet Take 20 mg by mouth once daily.    meclizine (ANTIVERT) 25 mg tablet Take 25 mg by mouth once daily.    omega-3 acid ethyl esters (LOVAZA) 1 gram capsule Take 2 g by mouth 2 (two) times daily. Ran out    sertraline (ZOLOFT) 100 MG tablet Take 100 mg by mouth once daily. 1 tab in am then half tab at hs    tamsulosin (FLOMAX) 0.4 mg Cap Take 0.4 mg by mouth every evening.    tiotropium (SPIRIVA WITH HANDIHALER) 18 mcg inhalation capsule Inhale 18 mcg into the lungs once daily.    vitamin E 100 UNIT capsule Take 100 Units by mouth once daily.    aspirin (ECOTRIN) 81 MG EC tablet Take 1 tablet (81 mg total) by mouth every 12 (twelve) hours. Blood clot prevention    traMADoL (ULTRAM) 50 mg tablet Take 1 tablet (50 mg total) by mouth every 6 (six) hours as needed for Pain. (Patient not taking: Reported on 9/19/2023)     No current facility-administered medications for this visit.     Facility-Administered Medications Ordered in Other Visits   Medication    betamethasone acetate-betamethasone sodium phosphate injection 12 mg    betamethasone acetate-betamethasone sodium phosphate injection 12 mg    hyaluronate (SYNVISC) 16 mg/2 mL injection 16 mg    LIDOcaine (PF) 20 mg/mL (2%) injection 5 mL    LIDOcaine (PF) 20 mg/mL (2%) injection 5 mL    LIDOcaine HCL 20 mg/ml (2%) injection 2 mL        Review of patient's allergies indicates:   Allergen Reactions    Sulfa (sulfonamide antibiotics)        Family History   Problem Relation Name Age of Onset    Cancer Mother      Diabetes Maternal Grandmother         Social History     Socioeconomic History    Marital status:    Tobacco Use    Smoking status: Never    Smokeless tobacco: Never   Substance and Sexual Activity    Alcohol use: Yes     Alcohol/week: 7.0 standard drinks of alcohol     Types: 7 Cans of beer per week    Drug use: Never    Sexual activity: Yes     Partners: Female           Review of Systems:    Constitution: Negative for chills, fever, and sweats.  Negative for unexplained weight loss.    HENT:  Negative for headaches and blurry vision.    Cardiovascular:Negative for chest pain or irregular heart beat. Negative for hypertension.    Respiratory:  Negative for cough and shortness of breath.    Gastrointestinal: Negative for abdominal pain, heartburn, melena, nausea, and vomitting.    Genitourinary:  Negative bladder incontinence and dysuria.    Musculoskeletal:  See HPI    Neurological: Negative for numbness.    Psychiatric/Behavioral: Negative for depression.  The patient is not nervous/anxious.      Endocrine: Negative for polyuria    Hematologic/Lymphatic: Negative for bleeding problem.  Does not bruise/bleed easily.    Skin: Negative for poor would healing and rash      Physical Examination:    Vital Signs:    Vitals:    05/10/24 0959   BP: (!) 143/73   Pulse: 61       Body mass index is 41.79 kg/m².    General: No acute distress, alert and oriented, healthy appearing    HEENT: Head is atraumatic, mucous membranes are moist    Neck: Supples, no JVD    Cardiovascular: Palpable dorsalis pedis and posterior tibial pulses, regular rate and rhythm to those pulses    Lungs: Breathing non-labored    Skin: no rashes appreciated    Neurologic: Can flex and extend knees, ankles, and toes. Sensation is grossly intact    Right knee:  Full  extension greater than 120° of flexion.  Patient has stability of the extension.  Does have mild of instability at mid flexion and full flexion.    Left knee:  Patient was fixed varus deformity of the left knee.  Brisk cap refill disappeared sensation intact distally.  Range of motion of the left knee is from 5-110.  Has significant crepitus range of motion.  Varus deformity is not correctable.    X-rays:  Three views of bilateral knees reviewed.  Patient's implants of the right side of well fixed.  It was well-fixed components no signs of loosening or subsidence noted.  Left knee shows end-stage osteoarthritis.  It was less joint space and bone-on-bone articulation.  Patient has end-stage osteoarthritis of the left knee.  Kellgren Kevan grade 4 changes     Assessment::  Left knee osteoarthritis  Status post right total knee arthroplasty    Plan:  Patient's right knee is doing very well.  It was a very mild amount of instability.  He would like to try a brace he was improve his symptoms.  We will get him fitted for 1 of these.  With regards to his left knee, the patient was given injection today.  Patient has tried and failed all conservative management.  Feels as though he has reached a point disability like to proceed with surgical intervention.  Patient has end-stage osteoarthritis of the left knee as well.  We would like to proceed with surgical intervention.  We will go and plan for him get left total knee arthroplasty at the end of July.    This note was generated with the assistance of ambient listening technology. Verbal consent was obtained by the patient and accompanying visitor(s) for the recording of patient appointment to facilitate this note. I attest to having reviewed and edited the generated note for accuracy, though some syntax or spelling errors may persist. Please contact the author of this note for any clarification.      This note was created using [employer] voice recognition software that  occasionally misinterpreted phrases or words.    Consult note is delivered via Epic messaging service.

## 2024-05-10 NOTE — PROCEDURES
Large Joint Aspiration/Injection: L knee    Date/Time: 5/10/2024 10:00 AM    Performed by: Danya Baez FNP  Authorized by: Omer Ibrahim MD    Consent Done?:  Yes (Verbal)  Indications:  Arthritis and pain  Timeout: prior to procedure the correct patient, procedure, and site was verified    Prep: patient was prepped and draped in usual sterile fashion    Local anesthesia used?: No      Details:  Needle Size:  22 G  Ultrasonic Guidance for needle placement?: No    Approach:  Anterolateral  Location:  Knee  Site:  L knee  Medications:  5 mL LIDOcaine (PF) 20 mg/mL (2%) 20 mg/mL (2 %); 12 mg betamethasone acetate-betamethasone sodium phosphate 6 mg/mL  Patient tolerance:  Patient tolerated the procedure well with no immediate complications

## 2024-06-27 ENCOUNTER — OFFICE VISIT (OUTPATIENT)
Dept: ORTHOPEDICS | Facility: CLINIC | Age: 79
End: 2024-06-27
Payer: MEDICARE

## 2024-06-27 VITALS
DIASTOLIC BLOOD PRESSURE: 80 MMHG | HEART RATE: 61 BPM | BODY MASS INDEX: 39.8 KG/M2 | HEIGHT: 70 IN | TEMPERATURE: 98 F | WEIGHT: 278 LBS | SYSTOLIC BLOOD PRESSURE: 135 MMHG

## 2024-06-27 DIAGNOSIS — Z01.818 PREOPERATIVE TESTING: ICD-10-CM

## 2024-06-27 DIAGNOSIS — M17.12 PRIMARY OSTEOARTHRITIS OF LEFT KNEE: Primary | ICD-10-CM

## 2024-06-27 DIAGNOSIS — R79.9 ABNORMAL BLOOD CHEMISTRY LEVEL: ICD-10-CM

## 2024-06-27 DIAGNOSIS — M19.90 OSTEOARTHRITIS: ICD-10-CM

## 2024-06-27 RX ORDER — ACETAMINOPHEN 500 MG
1000 TABLET ORAL
OUTPATIENT
Start: 2024-06-27

## 2024-06-27 RX ORDER — GABAPENTIN 100 MG/1
300 CAPSULE ORAL
OUTPATIENT
Start: 2024-06-27

## 2024-06-27 RX ORDER — TRANEXAMIC ACID 650 MG/1
1950 TABLET ORAL
OUTPATIENT
Start: 2024-06-27 | End: 2024-06-27

## 2024-06-27 RX ORDER — SCOLOPAMINE TRANSDERMAL SYSTEM 1 MG/1
1 PATCH, EXTENDED RELEASE TRANSDERMAL ONCE AS NEEDED
OUTPATIENT
Start: 2024-06-27 | End: 2035-11-24

## 2024-06-27 RX ORDER — ONDANSETRON 4 MG/1
4 TABLET, ORALLY DISINTEGRATING ORAL
OUTPATIENT
Start: 2024-06-27

## 2024-06-27 RX ORDER — SODIUM CHLORIDE, SODIUM GLUCONATE, SODIUM ACETATE, POTASSIUM CHLORIDE AND MAGNESIUM CHLORIDE 30; 37; 368; 526; 502 MG/100ML; MG/100ML; MG/100ML; MG/100ML; MG/100ML
INJECTION, SOLUTION INTRAVENOUS CONTINUOUS
OUTPATIENT
Start: 2024-06-27

## 2024-06-27 RX ORDER — KETOROLAC TROMETHAMINE 10 MG/1
10 TABLET, FILM COATED ORAL
OUTPATIENT
Start: 2024-06-27 | End: 2024-06-27

## 2024-06-27 NOTE — PROGRESS NOTES
Chief Complaint:   Chief Complaint   Patient presents with    Pre-op Exam     Pre-op left TKA sx 7/15/24. States he had kidney stones last week. Removed last Wednesday        History of present illness: Camden Uriarte is a 78 y.o. male, presents to clinic today in regards to his left knee.  Patient does have a longstanding history of osteoarthritis.  Has been treated with multiple injections in the past.  He is gotten 0 relief with these.  He is also tried over-the-counter anti-inflammatories as well as lifestyle modifications.  These have failed to relieve his symptoms.  He continues to have pain throughout the knee.  This has worse with prolonged ambulation and bending.  He feels as though he has reached a point of disability would like to proceed with a left total knee arthroplasty.    Past Medical History:   Diagnosis Date    Anemia, unspecified     Arthritis     Cancer     Lips    COPD (chronic obstructive pulmonary disease)     Elevated cholesterol     Hypertension     Sleep apnea     Thrombosis     Lungs       Past Surgical History:   Procedure Laterality Date    gastric sleeve      kidney stones      ROBOTIC ARTHROPLASTY, KNEE Right 10/10/2022    Procedure: ROBOTIC ARTHROPLASTY, KNEE, TOTAL;  Surgeon: Omer Ibrahim MD;  Location: [facility];  Service: Orthopedics;  Laterality: Right;    TOTAL SHOULDER ARTHROPLASTY Right 2018    Dr. Ceja       Current Outpatient Medications   Medication Sig    allopurinoL (ZYLOPRIM) 300 MG tablet Take 300 mg by mouth once daily.    amlodipine-benazepril 5-20 mg (LOTREL) 5-20 mg per capsule Take 1 capsule by mouth once daily.    atorvastatin (LIPITOR) 40 MG tablet Take 40 mg by mouth once daily.    carvediloL (COREG) 12.5 MG tablet Take 12.5 mg by mouth 2 (two) times daily with meals.    cetirizine (ZYRTEC) 10 MG tablet Take 10 mg by mouth once daily.    furosemide (LASIX) 20 MG tablet Take 20 mg by mouth once daily.    meclizine (ANTIVERT) 25 mg tablet Take 25 mg by mouth  once daily.    omega-3 acid ethyl esters (LOVAZA) 1 gram capsule Take 2 g by mouth 2 (two) times daily. Ran out    sertraline (ZOLOFT) 100 MG tablet Take 100 mg by mouth once daily. 1 tab in am then half tab at hs    tamsulosin (FLOMAX) 0.4 mg Cap Take 0.4 mg by mouth every evening.    tiotropium (SPIRIVA WITH HANDIHALER) 18 mcg inhalation capsule Inhale 18 mcg into the lungs once daily.    vitamin E 100 UNIT capsule Take 100 Units by mouth once daily.    aspirin (ECOTRIN) 81 MG EC tablet Take 1 tablet (81 mg total) by mouth every 12 (twelve) hours. Blood clot prevention    traMADoL (ULTRAM) 50 mg tablet Take 1 tablet (50 mg total) by mouth every 6 (six) hours as needed for Pain. (Patient not taking: Reported on 9/19/2023)     No current facility-administered medications for this visit.     Facility-Administered Medications Ordered in Other Visits   Medication    betamethasone acetate-betamethasone sodium phosphate injection 12 mg    betamethasone acetate-betamethasone sodium phosphate injection 12 mg    hyaluronate (SYNVISC) 16 mg/2 mL injection 16 mg    LIDOcaine (PF) 20 mg/mL (2%) injection 5 mL    LIDOcaine (PF) 20 mg/mL (2%) injection 5 mL    LIDOcaine HCL 20 mg/ml (2%) injection 2 mL       Review of patient's allergies indicates:  No Known Allergies    Family History   Problem Relation Name Age of Onset    Cancer Mother      Diabetes Maternal Grandmother         Social History     Socioeconomic History    Marital status:    Tobacco Use    Smoking status: Never    Smokeless tobacco: Never   Substance and Sexual Activity    Alcohol use: Yes     Alcohol/week: 7.0 standard drinks of alcohol     Types: 7 Cans of beer per week    Drug use: Never    Sexual activity: Yes     Partners: Female           Review of Systems:    Denies fevers, chills, chest pain, shortness of breath. Comprehensive review of systems performed and otherwise negative except as noted in HPI     Physical Examination:    General: awake and  alert, no acute distress, healthy appearing  Head and Neck: Head atraumatic/normocephalic. Moist MM  CV: brisk cap refill  Lungs: non-labored breathing, w/o cough or SOB  Skin: no rashes present, warm to touch  Neuro: sensation grossly intact distally       Vital Signs:    Vitals:    06/27/24 1130   BP: 135/80   Pulse: 61   Temp: 98.3 °F (36.8 °C)       Body mass index is 39.89 kg/m².    Left knee:  Patient was fixed varus deformity of the left knee.  Brisk cap refill disappeared sensation intact distally.  Range of motion of the left knee is from 5-110.  Has significant crepitus range of motion.  Varus deformity is not correctable.     Assessment::primary OA left knee    Plan:  At this point the patient is tried and failed all conservative management with regards to their left knee. They have tried and failed nonoperative management including: Anti-inflammatories, activity modification, injections. All of these have failed to completely remove their pain. They have pain going up and down stairs as well as walking on level ground. The knee pain is affecting activities of daily living They feel that they've reached a point of disability with regards to their knee. X-rays reveal advanced, end-stage degenerative osteoarthritis as noted by subchondral sclerosis, joint space narrowing in the medial, patellofemoral, lateral compartment, and periarticular osteophytes. The patient would like to proceed with surgical intervention and would be a good candidate for total knee replacement.    Total knee arthroplasty procedure, alternatives, risks, and benefits were discussed in detail. The risks including but not limited to: infection, need for revision surgery, pain, swelling, loosening, injury to surrounding neurovascular structures, stiffness, incomplete resolution of pain, DVT, PE, and death were discussed in detail. Despite these risks, the patient would like to proceed with surgical intervention. All questions were  answered, no guarantees made. Will plan for left TKA on 7/15/24.      This note was created using [employer] voice recognition software that occasionally misinterpreted phrases or words.    Consult note is delivered via Epic messaging service.

## 2024-06-27 NOTE — H&P (VIEW-ONLY)
Chief Complaint:   Chief Complaint   Patient presents with    Pre-op Exam     Pre-op left TKA sx 7/15/24. States he had kidney stones last week. Removed last Wednesday        History of present illness: Camden Uriarte is a 78 y.o. male, presents to clinic today in regards to his left knee.  Patient does have a longstanding history of osteoarthritis.  Has been treated with multiple injections in the past.  He is gotten 0 relief with these.  He is also tried over-the-counter anti-inflammatories as well as lifestyle modifications.  These have failed to relieve his symptoms.  He continues to have pain throughout the knee.  This has worse with prolonged ambulation and bending.  He feels as though he has reached a point of disability would like to proceed with a left total knee arthroplasty.    Past Medical History:   Diagnosis Date    Anemia, unspecified     Arthritis     Cancer     Lips    COPD (chronic obstructive pulmonary disease)     Elevated cholesterol     Hypertension     Sleep apnea     Thrombosis     Lungs       Past Surgical History:   Procedure Laterality Date    gastric sleeve      kidney stones      ROBOTIC ARTHROPLASTY, KNEE Right 10/10/2022    Procedure: ROBOTIC ARTHROPLASTY, KNEE, TOTAL;  Surgeon: Omer Ibrahim MD;  Location: [facility];  Service: Orthopedics;  Laterality: Right;    TOTAL SHOULDER ARTHROPLASTY Right 2018    Dr. Ceja       Current Outpatient Medications   Medication Sig    allopurinoL (ZYLOPRIM) 300 MG tablet Take 300 mg by mouth once daily.    amlodipine-benazepril 5-20 mg (LOTREL) 5-20 mg per capsule Take 1 capsule by mouth once daily.    atorvastatin (LIPITOR) 40 MG tablet Take 40 mg by mouth once daily.    carvediloL (COREG) 12.5 MG tablet Take 12.5 mg by mouth 2 (two) times daily with meals.    cetirizine (ZYRTEC) 10 MG tablet Take 10 mg by mouth once daily.    furosemide (LASIX) 20 MG tablet Take 20 mg by mouth once daily.    meclizine (ANTIVERT) 25 mg tablet Take 25 mg by mouth  once daily.    omega-3 acid ethyl esters (LOVAZA) 1 gram capsule Take 2 g by mouth 2 (two) times daily. Ran out    sertraline (ZOLOFT) 100 MG tablet Take 100 mg by mouth once daily. 1 tab in am then half tab at hs    tamsulosin (FLOMAX) 0.4 mg Cap Take 0.4 mg by mouth every evening.    tiotropium (SPIRIVA WITH HANDIHALER) 18 mcg inhalation capsule Inhale 18 mcg into the lungs once daily.    vitamin E 100 UNIT capsule Take 100 Units by mouth once daily.    aspirin (ECOTRIN) 81 MG EC tablet Take 1 tablet (81 mg total) by mouth every 12 (twelve) hours. Blood clot prevention    traMADoL (ULTRAM) 50 mg tablet Take 1 tablet (50 mg total) by mouth every 6 (six) hours as needed for Pain. (Patient not taking: Reported on 9/19/2023)     No current facility-administered medications for this visit.     Facility-Administered Medications Ordered in Other Visits   Medication    betamethasone acetate-betamethasone sodium phosphate injection 12 mg    betamethasone acetate-betamethasone sodium phosphate injection 12 mg    hyaluronate (SYNVISC) 16 mg/2 mL injection 16 mg    LIDOcaine (PF) 20 mg/mL (2%) injection 5 mL    LIDOcaine (PF) 20 mg/mL (2%) injection 5 mL    LIDOcaine HCL 20 mg/ml (2%) injection 2 mL       Review of patient's allergies indicates:  No Known Allergies    Family History   Problem Relation Name Age of Onset    Cancer Mother      Diabetes Maternal Grandmother         Social History     Socioeconomic History    Marital status:    Tobacco Use    Smoking status: Never    Smokeless tobacco: Never   Substance and Sexual Activity    Alcohol use: Yes     Alcohol/week: 7.0 standard drinks of alcohol     Types: 7 Cans of beer per week    Drug use: Never    Sexual activity: Yes     Partners: Female           Review of Systems:    Denies fevers, chills, chest pain, shortness of breath. Comprehensive review of systems performed and otherwise negative except as noted in HPI     Physical Examination:    General: awake and  alert, no acute distress, healthy appearing  Head and Neck: Head atraumatic/normocephalic. Moist MM  CV: brisk cap refill  Lungs: non-labored breathing, w/o cough or SOB  Skin: no rashes present, warm to touch  Neuro: sensation grossly intact distally       Vital Signs:    Vitals:    06/27/24 1130   BP: 135/80   Pulse: 61   Temp: 98.3 °F (36.8 °C)       Body mass index is 39.89 kg/m².    Left knee:  Patient was fixed varus deformity of the left knee.  Brisk cap refill disappeared sensation intact distally.  Range of motion of the left knee is from 5-110.  Has significant crepitus range of motion.  Varus deformity is not correctable.     Assessment::primary OA left knee    Plan:  At this point the patient is tried and failed all conservative management with regards to their left knee. They have tried and failed nonoperative management including: Anti-inflammatories, activity modification, injections. All of these have failed to completely remove their pain. They have pain going up and down stairs as well as walking on level ground. The knee pain is affecting activities of daily living They feel that they've reached a point of disability with regards to their knee. X-rays reveal advanced, end-stage degenerative osteoarthritis as noted by subchondral sclerosis, joint space narrowing in the medial, patellofemoral, lateral compartment, and periarticular osteophytes. The patient would like to proceed with surgical intervention and would be a good candidate for total knee replacement.    Total knee arthroplasty procedure, alternatives, risks, and benefits were discussed in detail. The risks including but not limited to: infection, need for revision surgery, pain, swelling, loosening, injury to surrounding neurovascular structures, stiffness, incomplete resolution of pain, DVT, PE, and death were discussed in detail. Despite these risks, the patient would like to proceed with surgical intervention. All questions were  answered, no guarantees made. Will plan for left TKA on 7/15/24.      This note was created using [employer] voice recognition software that occasionally misinterpreted phrases or words.    Consult note is delivered via Epic messaging service.

## 2024-07-03 ENCOUNTER — HOSPITAL ENCOUNTER (OUTPATIENT)
Dept: RADIOLOGY | Facility: HOSPITAL | Age: 79
Discharge: HOME | End: 2024-07-03
Attending: NURSE PRACTITIONER
Payer: MEDICARE

## 2024-07-03 ENCOUNTER — ANESTHESIA EVENT (OUTPATIENT)
Dept: SURGERY | Facility: HOSPITAL | Age: 79
DRG: 470 | End: 2024-07-03
Payer: MEDICARE

## 2024-07-03 DIAGNOSIS — M17.12 PRIMARY OSTEOARTHRITIS OF LEFT KNEE: ICD-10-CM

## 2024-07-03 DIAGNOSIS — R79.9 ABNORMAL BLOOD CHEMISTRY LEVEL: ICD-10-CM

## 2024-07-03 DIAGNOSIS — Z01.818 PREOPERATIVE TESTING: ICD-10-CM

## 2024-07-03 PROCEDURE — 71046 X-RAY EXAM CHEST 2 VIEWS: CPT | Mod: TC

## 2024-07-03 PROCEDURE — 73700 CT LOWER EXTREMITY W/O DYE: CPT | Mod: TC,LT

## 2024-07-03 RX ORDER — BENAZEPRIL HYDROCHLORIDE 40 MG/1
1 TABLET ORAL EVERY MORNING
COMMUNITY

## 2024-07-03 RX ORDER — PNV NO.95/FERROUS FUM/FOLIC AC 28MG-0.8MG
100 TABLET ORAL DAILY
COMMUNITY

## 2024-07-08 NOTE — CLINICAL REVIEW
labs/EKG/CXR reviewed. cardiology notes utd. Carotid/Echo noted. CRA provided. chart review complete. ccv

## 2024-07-14 NOTE — ANESTHESIA PREPROCEDURE EVALUATION
"                                                                                                             07/14/2024  Camden Uriarte is a 78 y.o., male, who presents for the following:    Case: [id number] Date/Time: 07/15/24 1050   Procedure: ROBOTIC ARTHROPLASTY, KNEE, TOTAL (Left: Knee)   Anesthesia type: Spinal   Diagnosis: Primary osteoarthritis of left knee [M17.12]   Pre-op diagnosis: Primary osteoarthritis of left knee [M17.12]   Location: [facility] OR 08 / [facility] OR   Surgeons: Omer Ibrahim MD     HT : 5' 10"    ** PE after last Rt.TKA (2022)    LAB: reviewed        Pre-op Assessment    I have reviewed the Patient Summary Reports.     I have reviewed the Nursing Notes. I have reviewed the NPO Status.   I have reviewed the Medications.     Review of Systems  Anesthesia Hx:  No problems with previous Anesthesia             Denies Family Hx of Anesthesia complications.    Denies Personal Hx of Anesthesia complications.                    Social:  Non-Smoker       EENT/Dental:   [place]          Cardiovascular:     Hypertension                                        Pulmonary:   COPD     Sleep Apnea, CPAP                Musculoskeletal:  Arthritis               Endocrine:        Morbid Obesity / BMI > 40      Physical Exam  General: Alert and Oriented    Airway:  Mallampati: II   Mouth Opening: Normal  TM Distance: Normal  Tongue: Normal  Neck ROM: Normal ROM    Dental:  Edentulous    Chest/Lungs:  Normal Respiratory Rate    Heart:  Rate: Normal  Rhythm: Regular Rhythm        Anesthesia Plan  Type of Anesthesia, risks & benefits discussed:    Anesthesia Type: Spinal, Gen Natural Airway  Intra-op Monitoring Plan: Standard ASA Monitors  Post Op Pain Control Plan: IV/PO Opioids PRN  Induction:  IV  Airway Plan: Direct  Informed Consent: Informed consent signed with the Patient and all parties understand the risks and agree with anesthesia plan.  All questions answered. Patient consented to blood products? No  ASA Score: " 3  Day of Surgery Review of History & Physical: H&P Update referred to the surgeon/provider.  Anesthesia Plan Notes: Nasal cannula vs facemask supplemental oxygenation   For patients with NASRA/obesity, may consider SuperNoval Nasal CPAP  Spinal Anes w/ IV Propofol sedation , poss conversion to GA/LMA  Adductor Canal Block for post-operative analgesia, per surgeon request    Ready For Surgery From Anesthesia Perspective.     .

## 2024-07-15 ENCOUNTER — ANESTHESIA (OUTPATIENT)
Dept: SURGERY | Facility: HOSPITAL | Age: 79
DRG: 470 | End: 2024-07-15
Payer: MEDICARE

## 2024-07-15 ENCOUNTER — HOSPITAL ENCOUNTER (INPATIENT)
Facility: HOSPITAL | Age: 79
LOS: 1 days | Discharge: HOME | DRG: 470 | End: 2024-07-17
Attending: ORTHOPAEDIC SURGERY | Admitting: ORTHOPAEDIC SURGERY
Payer: MEDICARE

## 2024-07-15 DIAGNOSIS — M19.90 OSTEOARTHRITIS: ICD-10-CM

## 2024-07-15 DIAGNOSIS — M17.12 PRIMARY OSTEOARTHRITIS OF LEFT KNEE: ICD-10-CM

## 2024-07-15 DIAGNOSIS — Z01.818 PREOPERATIVE TESTING: ICD-10-CM

## 2024-07-15 DIAGNOSIS — R79.9 ABNORMAL BLOOD CHEMISTRY LEVEL: ICD-10-CM

## 2024-07-15 PROBLEM — E11.9 TYPE 2 DIABETES MELLITUS: Status: RESOLVED | Noted: 2024-07-15 | Resolved: 2024-07-15

## 2024-07-15 PROBLEM — E78.5 HYPERLIPIDEMIA: Status: ACTIVE | Noted: 2024-07-15

## 2024-07-15 PROBLEM — J45.909 ASTHMA: Status: ACTIVE | Noted: 2024-07-15

## 2024-07-15 PROBLEM — K21.9 GASTROESOPHAGEAL REFLUX DISEASE: Status: ACTIVE | Noted: 2024-07-15

## 2024-07-15 PROBLEM — N40.0 BENIGN PROSTATIC HYPERPLASIA: Status: ACTIVE | Noted: 2024-07-15

## 2024-07-15 PROBLEM — I10 HYPERTENSION: Status: ACTIVE | Noted: 2024-07-15

## 2024-07-15 PROBLEM — E11.9 TYPE 2 DIABETES MELLITUS: Status: ACTIVE | Noted: 2024-07-15

## 2024-07-15 PROBLEM — F41.9 ANXIETY DISORDER, UNSPECIFIED: Status: ACTIVE | Noted: 2024-07-15

## 2024-07-15 LAB
HCT VFR BLD AUTO: 40.8 % (ref 42–52)
HGB BLD-MCNC: 13.6 G/DL (ref 14–18)
POCT GLUCOSE: 108 MG/DL (ref 70–110)

## 2024-07-15 PROCEDURE — 63600175 PHARM REV CODE 636 W HCPCS: Performed by: NURSE ANESTHETIST, CERTIFIED REGISTERED

## 2024-07-15 PROCEDURE — 99900035 HC TECH TIME PER 15 MIN (STAT)

## 2024-07-15 PROCEDURE — 94799 UNLISTED PULMONARY SVC/PX: CPT

## 2024-07-15 PROCEDURE — 8E0Y0CZ ROBOTIC ASSISTED PROCEDURE OF LOWER EXTREMITY, OPEN APPROACH: ICD-10-PCS | Performed by: ORTHOPAEDIC SURGERY

## 2024-07-15 PROCEDURE — 25000003 PHARM REV CODE 250: Performed by: ORTHOPAEDIC SURGERY

## 2024-07-15 PROCEDURE — 71000033 HC RECOVERY, INTIAL HOUR: Performed by: ORTHOPAEDIC SURGERY

## 2024-07-15 PROCEDURE — 85014 HEMATOCRIT: CPT | Performed by: ORTHOPAEDIC SURGERY

## 2024-07-15 PROCEDURE — 94761 N-INVAS EAR/PLS OXIMETRY MLT: CPT

## 2024-07-15 PROCEDURE — 63600175 PHARM REV CODE 636 W HCPCS: Mod: JZ,JG | Performed by: ANESTHESIOLOGY

## 2024-07-15 PROCEDURE — 25000003 PHARM REV CODE 250: Performed by: NURSE PRACTITIONER

## 2024-07-15 PROCEDURE — 27000221 HC OXYGEN, UP TO 24 HOURS

## 2024-07-15 PROCEDURE — D9220A PRA ANESTHESIA: Mod: ANES,,, | Performed by: ANESTHESIOLOGY

## 2024-07-15 PROCEDURE — 63600175 PHARM REV CODE 636 W HCPCS: Performed by: ORTHOPAEDIC SURGERY

## 2024-07-15 PROCEDURE — A4216 STERILE WATER/SALINE, 10 ML: HCPCS | Performed by: ORTHOPAEDIC SURGERY

## 2024-07-15 PROCEDURE — 88311 DECALCIFY TISSUE: CPT

## 2024-07-15 PROCEDURE — D9220A PRA ANESTHESIA: Mod: CRNA,,, | Performed by: NURSE ANESTHETIST, CERTIFIED REGISTERED

## 2024-07-15 PROCEDURE — 82962 GLUCOSE BLOOD TEST: CPT | Performed by: ORTHOPAEDIC SURGERY

## 2024-07-15 PROCEDURE — 63600175 PHARM REV CODE 636 W HCPCS: Performed by: NURSE PRACTITIONER

## 2024-07-15 PROCEDURE — C1713 ANCHOR/SCREW BN/BN,TIS/BN: HCPCS | Performed by: ORTHOPAEDIC SURGERY

## 2024-07-15 PROCEDURE — G0378 HOSPITAL OBSERVATION PER HR: HCPCS

## 2024-07-15 PROCEDURE — 25000003 PHARM REV CODE 250: Performed by: NURSE ANESTHETIST, CERTIFIED REGISTERED

## 2024-07-15 PROCEDURE — 64447 NJX AA&/STRD FEMORAL NRV IMG: CPT | Performed by: ANESTHESIOLOGY

## 2024-07-15 PROCEDURE — 27201423 OPTIME MED/SURG SUP & DEVICES STERILE SUPPLY: Performed by: ORTHOPAEDIC SURGERY

## 2024-07-15 PROCEDURE — 37000009 HC ANESTHESIA EA ADD 15 MINS: Performed by: ORTHOPAEDIC SURGERY

## 2024-07-15 PROCEDURE — 88305 TISSUE EXAM BY PATHOLOGIST: CPT | Performed by: ORTHOPAEDIC SURGERY

## 2024-07-15 PROCEDURE — 36000712 HC OR TIME LEV V 1ST 15 MIN: Performed by: ORTHOPAEDIC SURGERY

## 2024-07-15 PROCEDURE — 99900031 HC PATIENT EDUCATION (STAT)

## 2024-07-15 PROCEDURE — 36415 COLL VENOUS BLD VENIPUNCTURE: CPT | Performed by: ORTHOPAEDIC SURGERY

## 2024-07-15 PROCEDURE — 37000008 HC ANESTHESIA 1ST 15 MINUTES: Performed by: ORTHOPAEDIC SURGERY

## 2024-07-15 PROCEDURE — 51798 US URINE CAPACITY MEASURE: CPT

## 2024-07-15 PROCEDURE — 0SRD0JZ REPLACEMENT OF LEFT KNEE JOINT WITH SYNTHETIC SUBSTITUTE, OPEN APPROACH: ICD-10-PCS | Performed by: ORTHOPAEDIC SURGERY

## 2024-07-15 PROCEDURE — 51798 US URINE CAPACITY MEASURE: CPT | Performed by: ORTHOPAEDIC SURGERY

## 2024-07-15 PROCEDURE — 36000713 HC OR TIME LEV V EA ADD 15 MIN: Performed by: ORTHOPAEDIC SURGERY

## 2024-07-15 PROCEDURE — C1776 JOINT DEVICE (IMPLANTABLE): HCPCS | Performed by: ORTHOPAEDIC SURGERY

## 2024-07-15 PROCEDURE — 97162 PT EVAL MOD COMPLEX 30 MIN: CPT

## 2024-07-15 DEVICE — TIBIAL BEARING INSERT - CS
Type: IMPLANTABLE DEVICE | Site: KNEE | Status: FUNCTIONAL
Brand: TRIATHLON

## 2024-07-15 DEVICE — TIBIAL COMPONENT
Type: IMPLANTABLE DEVICE | Site: KNEE | Status: FUNCTIONAL
Brand: TRIATHLON

## 2024-07-15 DEVICE — CRUCIATE RETAINING FEMORAL
Type: IMPLANTABLE DEVICE | Site: KNEE | Status: FUNCTIONAL
Brand: TRIATHLON

## 2024-07-15 RX ORDER — PHENYLEPHRINE HYDROCHLORIDE 10 MG/ML
INJECTION INTRAVENOUS CONTINUOUS PRN
Status: DISCONTINUED | OUTPATIENT
Start: 2024-07-15 | End: 2024-07-15

## 2024-07-15 RX ORDER — VANCOMYCIN HYDROCHLORIDE 1 G/20ML
INJECTION, POWDER, LYOPHILIZED, FOR SOLUTION INTRAVENOUS
Status: DISCONTINUED | OUTPATIENT
Start: 2024-07-15 | End: 2024-07-15 | Stop reason: HOSPADM

## 2024-07-15 RX ORDER — MORPHINE SULFATE 10 MG/ML
INJECTION INTRAMUSCULAR; INTRAVENOUS; SUBCUTANEOUS
Status: DISPENSED
Start: 2024-07-15 | End: 2024-07-15

## 2024-07-15 RX ORDER — TALC
6 POWDER (GRAM) TOPICAL NIGHTLY PRN
Status: DISCONTINUED | OUTPATIENT
Start: 2024-07-15 | End: 2024-07-17 | Stop reason: HOSPADM

## 2024-07-15 RX ORDER — BUPIVACAINE HYDROCHLORIDE 2.5 MG/ML
INJECTION, SOLUTION EPIDURAL; INFILTRATION; INTRACAUDAL
Status: COMPLETED
Start: 2024-07-15 | End: 2024-07-15

## 2024-07-15 RX ORDER — LIDOCAINE HYDROCHLORIDE 10 MG/ML
1 INJECTION, SOLUTION EPIDURAL; INFILTRATION; INTRACAUDAL; PERINEURAL ONCE
Status: DISCONTINUED | OUTPATIENT
Start: 2024-07-15 | End: 2024-07-15

## 2024-07-15 RX ORDER — LISINOPRIL 10 MG/1
40 TABLET ORAL DAILY
Status: DISCONTINUED | OUTPATIENT
Start: 2024-07-15 | End: 2024-07-17 | Stop reason: HOSPADM

## 2024-07-15 RX ORDER — GLUCAGON 1 MG
1 KIT INJECTION
Status: DISCONTINUED | OUTPATIENT
Start: 2024-07-15 | End: 2024-07-15 | Stop reason: HOSPADM

## 2024-07-15 RX ORDER — ROPIVACAINE HYDROCHLORIDE 5 MG/ML
INJECTION, SOLUTION EPIDURAL; INFILTRATION; PERINEURAL
Status: DISCONTINUED | OUTPATIENT
Start: 2024-07-15 | End: 2024-07-15 | Stop reason: HOSPADM

## 2024-07-15 RX ORDER — SODIUM CHLORIDE, SODIUM GLUCONATE, SODIUM ACETATE, POTASSIUM CHLORIDE AND MAGNESIUM CHLORIDE 30; 37; 368; 526; 502 MG/100ML; MG/100ML; MG/100ML; MG/100ML; MG/100ML
INJECTION, SOLUTION INTRAVENOUS CONTINUOUS
Status: DISCONTINUED | OUTPATIENT
Start: 2024-07-15 | End: 2024-07-15

## 2024-07-15 RX ORDER — METHOCARBAMOL 750 MG/1
750 TABLET, FILM COATED ORAL EVERY 6 HOURS PRN
Qty: 56 TABLET | Refills: 0 | Status: SHIPPED | OUTPATIENT
Start: 2024-07-15 | End: 2024-07-29

## 2024-07-15 RX ORDER — EPINEPHRINE 1 MG/ML
INJECTION, SOLUTION, CONCENTRATE INTRAVENOUS
Status: DISPENSED
Start: 2024-07-15 | End: 2024-07-15

## 2024-07-15 RX ORDER — MORPHINE SULFATE 10 MG/ML
INJECTION INTRAMUSCULAR; INTRAVENOUS; SUBCUTANEOUS
Status: DISCONTINUED | OUTPATIENT
Start: 2024-07-15 | End: 2024-07-15 | Stop reason: HOSPADM

## 2024-07-15 RX ORDER — ROPIVACAINE HYDROCHLORIDE 5 MG/ML
INJECTION, SOLUTION EPIDURAL; INFILTRATION; PERINEURAL
Status: DISPENSED
Start: 2024-07-15 | End: 2024-07-15

## 2024-07-15 RX ORDER — ENOXAPARIN SODIUM 100 MG/ML
30 INJECTION SUBCUTANEOUS EVERY 12 HOURS
Status: DISCONTINUED | OUTPATIENT
Start: 2024-07-16 | End: 2024-07-16

## 2024-07-15 RX ORDER — ONDANSETRON HYDROCHLORIDE 2 MG/ML
4 INJECTION, SOLUTION INTRAVENOUS EVERY 6 HOURS PRN
Status: DISCONTINUED | OUTPATIENT
Start: 2024-07-15 | End: 2024-07-17 | Stop reason: HOSPADM

## 2024-07-15 RX ORDER — KETOROLAC TROMETHAMINE 30 MG/ML
INJECTION, SOLUTION INTRAMUSCULAR; INTRAVENOUS
Status: DISPENSED
Start: 2024-07-15 | End: 2024-07-15

## 2024-07-15 RX ORDER — ALUMINUM HYDROXIDE, MAGNESIUM HYDROXIDE, AND SIMETHICONE 1200; 120; 1200 MG/30ML; MG/30ML; MG/30ML
30 SUSPENSION ORAL EVERY 6 HOURS PRN
Status: DISCONTINUED | OUTPATIENT
Start: 2024-07-15 | End: 2024-07-17 | Stop reason: HOSPADM

## 2024-07-15 RX ORDER — ACETAMINOPHEN 10 MG/ML
1000 INJECTION, SOLUTION INTRAVENOUS ONCE
Status: COMPLETED | OUTPATIENT
Start: 2024-07-15 | End: 2024-07-15

## 2024-07-15 RX ORDER — MIDAZOLAM HYDROCHLORIDE 1 MG/ML
INJECTION INTRAMUSCULAR; INTRAVENOUS
Status: DISCONTINUED | OUTPATIENT
Start: 2024-07-15 | End: 2024-07-15

## 2024-07-15 RX ORDER — HYDROCODONE BITARTRATE AND ACETAMINOPHEN 5; 325 MG/1; MG/1
1 TABLET ORAL EVERY 4 HOURS PRN
Status: DISCONTINUED | OUTPATIENT
Start: 2024-07-15 | End: 2024-07-17 | Stop reason: HOSPADM

## 2024-07-15 RX ORDER — SODIUM CHLORIDE 9 MG/ML
INJECTION, SOLUTION INTRAVENOUS CONTINUOUS
Status: DISCONTINUED | OUTPATIENT
Start: 2024-07-15 | End: 2024-07-17 | Stop reason: HOSPADM

## 2024-07-15 RX ORDER — ACETAMINOPHEN 500 MG
500 TABLET ORAL
Status: DISCONTINUED | OUTPATIENT
Start: 2024-07-16 | End: 2024-07-17 | Stop reason: HOSPADM

## 2024-07-15 RX ORDER — SODIUM CHLORIDE 0.9 % (FLUSH) 0.9 %
SYRINGE (ML) INJECTION
Status: DISPENSED
Start: 2024-07-15 | End: 2024-07-15

## 2024-07-15 RX ORDER — SODIUM CHLORIDE 9 MG/ML
INJECTION, SOLUTION INTRAMUSCULAR; INTRAVENOUS; SUBCUTANEOUS
Status: DISCONTINUED | OUTPATIENT
Start: 2024-07-15 | End: 2024-07-15 | Stop reason: HOSPADM

## 2024-07-15 RX ORDER — FAMOTIDINE 20 MG/1
20 TABLET, FILM COATED ORAL 2 TIMES DAILY
Status: DISCONTINUED | OUTPATIENT
Start: 2024-07-15 | End: 2024-07-17 | Stop reason: HOSPADM

## 2024-07-15 RX ORDER — ONDANSETRON HYDROCHLORIDE 2 MG/ML
INJECTION, SOLUTION INTRAVENOUS
Status: DISCONTINUED | OUTPATIENT
Start: 2024-07-15 | End: 2024-07-15

## 2024-07-15 RX ORDER — TRANEXAMIC ACID 650 MG/1
1950 TABLET ORAL
Status: COMPLETED | OUTPATIENT
Start: 2024-07-15 | End: 2024-07-15

## 2024-07-15 RX ORDER — ENOXAPARIN SODIUM 100 MG/ML
40 INJECTION SUBCUTANEOUS
Status: DISCONTINUED | OUTPATIENT
Start: 2024-07-16 | End: 2024-07-15

## 2024-07-15 RX ORDER — METHOCARBAMOL 750 MG/1
750 TABLET, FILM COATED ORAL EVERY 8 HOURS PRN
Status: DISCONTINUED | OUTPATIENT
Start: 2024-07-15 | End: 2024-07-17 | Stop reason: HOSPADM

## 2024-07-15 RX ORDER — METOCLOPRAMIDE HYDROCHLORIDE 5 MG/ML
10 INJECTION INTRAMUSCULAR; INTRAVENOUS
Status: DISCONTINUED | OUTPATIENT
Start: 2024-07-15 | End: 2024-07-16

## 2024-07-15 RX ORDER — LIDOCAINE HYDROCHLORIDE 10 MG/ML
INJECTION, SOLUTION EPIDURAL; INFILTRATION; INTRACAUDAL; PERINEURAL
Status: DISCONTINUED | OUTPATIENT
Start: 2024-07-15 | End: 2024-07-15

## 2024-07-15 RX ORDER — SERTRALINE HYDROCHLORIDE 50 MG/1
50 TABLET, FILM COATED ORAL NIGHTLY
Status: DISCONTINUED | OUTPATIENT
Start: 2024-07-15 | End: 2024-07-17 | Stop reason: HOSPADM

## 2024-07-15 RX ORDER — SCOLOPAMINE TRANSDERMAL SYSTEM 1 MG/1
1 PATCH, EXTENDED RELEASE TRANSDERMAL ONCE AS NEEDED
Status: DISCONTINUED | OUTPATIENT
Start: 2024-07-15 | End: 2024-07-15

## 2024-07-15 RX ORDER — POLYETHYLENE GLYCOL 3350 17 G/17G
17 POWDER, FOR SOLUTION ORAL NIGHTLY
Status: DISCONTINUED | OUTPATIENT
Start: 2024-07-15 | End: 2024-07-17 | Stop reason: HOSPADM

## 2024-07-15 RX ORDER — BUPIVACAINE HYDROCHLORIDE 7.5 MG/ML
INJECTION, SOLUTION EPIDURAL; RETROBULBAR
Status: COMPLETED | OUTPATIENT
Start: 2024-07-15 | End: 2024-07-15

## 2024-07-15 RX ORDER — ALLOPURINOL 300 MG/1
300 TABLET ORAL NIGHTLY
Status: CANCELLED | OUTPATIENT
Start: 2024-07-15

## 2024-07-15 RX ORDER — HYDROMORPHONE HYDROCHLORIDE 2 MG/ML
0.4 INJECTION, SOLUTION INTRAMUSCULAR; INTRAVENOUS; SUBCUTANEOUS EVERY 5 MIN PRN
Status: DISCONTINUED | OUTPATIENT
Start: 2024-07-15 | End: 2024-07-15 | Stop reason: HOSPADM

## 2024-07-15 RX ORDER — GLYCOPYRROLATE 0.2 MG/ML
INJECTION INTRAMUSCULAR; INTRAVENOUS
Status: DISCONTINUED | OUTPATIENT
Start: 2024-07-15 | End: 2024-07-15

## 2024-07-15 RX ORDER — ONDANSETRON HYDROCHLORIDE 2 MG/ML
4 INJECTION, SOLUTION INTRAVENOUS ONCE AS NEEDED
Status: DISCONTINUED | OUTPATIENT
Start: 2024-07-15 | End: 2024-07-15 | Stop reason: HOSPADM

## 2024-07-15 RX ORDER — EPINEPHRINE 1 MG/ML
INJECTION, SOLUTION, CONCENTRATE INTRAVENOUS
Status: DISCONTINUED | OUTPATIENT
Start: 2024-07-15 | End: 2024-07-15 | Stop reason: HOSPADM

## 2024-07-15 RX ORDER — GABAPENTIN 300 MG/1
300 CAPSULE ORAL NIGHTLY
Status: DISCONTINUED | OUTPATIENT
Start: 2024-07-15 | End: 2024-07-15

## 2024-07-15 RX ORDER — KETOROLAC TROMETHAMINE 10 MG/1
10 TABLET, FILM COATED ORAL
Status: COMPLETED | OUTPATIENT
Start: 2024-07-15 | End: 2024-07-15

## 2024-07-15 RX ORDER — ALLOPURINOL 300 MG/1
300 TABLET ORAL NIGHTLY
Status: DISCONTINUED | OUTPATIENT
Start: 2024-07-15 | End: 2024-07-17 | Stop reason: HOSPADM

## 2024-07-15 RX ORDER — MORPHINE SULFATE 4 MG/ML
4 INJECTION, SOLUTION INTRAMUSCULAR; INTRAVENOUS
Status: ACTIVE | OUTPATIENT
Start: 2024-07-15 | End: 2024-07-16

## 2024-07-15 RX ORDER — BISACODYL 10 MG/1
10 SUPPOSITORY RECTAL DAILY
Status: DISCONTINUED | OUTPATIENT
Start: 2024-07-18 | End: 2024-07-17 | Stop reason: HOSPADM

## 2024-07-15 RX ORDER — TRAMADOL HYDROCHLORIDE 50 MG/1
50 TABLET ORAL EVERY 4 HOURS PRN
Qty: 42 TABLET | Refills: 0 | Status: SHIPPED | OUTPATIENT
Start: 2024-07-15 | End: 2024-07-22

## 2024-07-15 RX ORDER — ONDANSETRON 4 MG/1
4 TABLET, ORALLY DISINTEGRATING ORAL
Status: COMPLETED | OUTPATIENT
Start: 2024-07-15 | End: 2024-07-15

## 2024-07-15 RX ORDER — CEFADROXIL 500 MG/1
500 CAPSULE ORAL EVERY 12 HOURS
Qty: 14 CAPSULE | Refills: 0 | Status: SHIPPED | OUTPATIENT
Start: 2024-07-15 | End: 2024-07-22

## 2024-07-15 RX ORDER — ACETAMINOPHEN 500 MG
1000 TABLET ORAL
Status: COMPLETED | OUTPATIENT
Start: 2024-07-15 | End: 2024-07-15

## 2024-07-15 RX ORDER — TAMSULOSIN HYDROCHLORIDE 0.4 MG/1
0.4 CAPSULE ORAL NIGHTLY
Status: DISCONTINUED | OUTPATIENT
Start: 2024-07-15 | End: 2024-07-17 | Stop reason: HOSPADM

## 2024-07-15 RX ORDER — CARVEDILOL 6.25 MG/1
12.5 TABLET ORAL 2 TIMES DAILY WITH MEALS
Status: DISCONTINUED | OUTPATIENT
Start: 2024-07-15 | End: 2024-07-17 | Stop reason: HOSPADM

## 2024-07-15 RX ORDER — GABAPENTIN 300 MG/1
300 CAPSULE ORAL
Status: COMPLETED | OUTPATIENT
Start: 2024-07-15 | End: 2024-07-15

## 2024-07-15 RX ORDER — SERTRALINE HYDROCHLORIDE 50 MG/1
100 TABLET, FILM COATED ORAL DAILY
Status: DISCONTINUED | OUTPATIENT
Start: 2024-07-15 | End: 2024-07-17 | Stop reason: HOSPADM

## 2024-07-15 RX ORDER — BUPIVACAINE HYDROCHLORIDE 2.5 MG/ML
20 INJECTION, SOLUTION EPIDURAL; INFILTRATION; INTRACAUDAL ONCE
Status: DISCONTINUED | OUTPATIENT
Start: 2024-07-15 | End: 2024-07-15 | Stop reason: HOSPADM

## 2024-07-15 RX ORDER — BUPIVACAINE HYDROCHLORIDE 2.5 MG/ML
INJECTION, SOLUTION EPIDURAL; INFILTRATION; INTRACAUDAL
Status: DISCONTINUED | OUTPATIENT
Start: 2024-07-15 | End: 2024-07-15

## 2024-07-15 RX ORDER — VANCOMYCIN HYDROCHLORIDE 1 G/20ML
INJECTION, POWDER, LYOPHILIZED, FOR SOLUTION INTRAVENOUS
Status: DISPENSED
Start: 2024-07-15 | End: 2024-07-15

## 2024-07-15 RX ORDER — TRAMADOL HYDROCHLORIDE 50 MG/1
50 TABLET ORAL EVERY 4 HOURS PRN
Status: DISCONTINUED | OUTPATIENT
Start: 2024-07-15 | End: 2024-07-17 | Stop reason: HOSPADM

## 2024-07-15 RX ORDER — PROPOFOL 10 MG/ML
VIAL (ML) INTRAVENOUS CONTINUOUS PRN
Status: DISCONTINUED | OUTPATIENT
Start: 2024-07-15 | End: 2024-07-15

## 2024-07-15 RX ORDER — KETOROLAC TROMETHAMINE 30 MG/ML
INJECTION, SOLUTION INTRAMUSCULAR; INTRAVENOUS
Status: DISCONTINUED | OUTPATIENT
Start: 2024-07-15 | End: 2024-07-15 | Stop reason: HOSPADM

## 2024-07-15 RX ORDER — KETOROLAC TROMETHAMINE 10 MG/1
10 TABLET, FILM COATED ORAL EVERY 6 HOURS
Status: DISCONTINUED | OUTPATIENT
Start: 2024-07-15 | End: 2024-07-16

## 2024-07-15 RX ORDER — DOCUSATE SODIUM 100 MG/1
200 CAPSULE, LIQUID FILLED ORAL DAILY
Status: DISCONTINUED | OUTPATIENT
Start: 2024-07-16 | End: 2024-07-17 | Stop reason: HOSPADM

## 2024-07-15 RX ORDER — LACTULOSE 10 G/15ML
20 SOLUTION ORAL EVERY 6 HOURS PRN
Status: DISCONTINUED | OUTPATIENT
Start: 2024-07-15 | End: 2024-07-17 | Stop reason: HOSPADM

## 2024-07-15 RX ORDER — AMOXICILLIN 250 MG
2 CAPSULE ORAL 2 TIMES DAILY
Status: DISCONTINUED | OUTPATIENT
Start: 2024-07-15 | End: 2024-07-17 | Stop reason: HOSPADM

## 2024-07-15 RX ORDER — DEXAMETHASONE SODIUM PHOSPHATE 4 MG/ML
INJECTION, SOLUTION INTRA-ARTICULAR; INTRALESIONAL; INTRAMUSCULAR; INTRAVENOUS; SOFT TISSUE
Status: DISCONTINUED | OUTPATIENT
Start: 2024-07-15 | End: 2024-07-15

## 2024-07-15 RX ADMIN — BUPIVACAINE HYDROCHLORIDE 30 ML: 2.5 INJECTION, SOLUTION EPIDURAL; INFILTRATION; INTRACAUDAL; PERINEURAL at 11:07

## 2024-07-15 RX ADMIN — DEXAMETHASONE SODIUM PHOSPHATE 8 MG: 4 INJECTION, SOLUTION INTRA-ARTICULAR; INTRALESIONAL; INTRAMUSCULAR; INTRAVENOUS; SOFT TISSUE at 09:07

## 2024-07-15 RX ADMIN — KETOROLAC TROMETHAMINE 10 MG: 10 TABLET, FILM COATED ORAL at 11:07

## 2024-07-15 RX ADMIN — SODIUM CHLORIDE, SODIUM GLUCONATE, SODIUM ACETATE, POTASSIUM CHLORIDE AND MAGNESIUM CHLORIDE: 526; 502; 368; 37; 30 INJECTION, SOLUTION INTRAVENOUS at 10:07

## 2024-07-15 RX ADMIN — PROPOFOL 50 MCG/KG/MIN: 10 INJECTION, EMULSION INTRAVENOUS at 09:07

## 2024-07-15 RX ADMIN — GABAPENTIN 300 MG: 300 CAPSULE ORAL at 07:07

## 2024-07-15 RX ADMIN — TAMSULOSIN HYDROCHLORIDE 0.4 MG: 0.4 CAPSULE ORAL at 08:07

## 2024-07-15 RX ADMIN — CARVEDILOL 12.5 MG: 6.25 TABLET, FILM COATED ORAL at 05:07

## 2024-07-15 RX ADMIN — KETOROLAC TROMETHAMINE 10 MG: 10 TABLET, FILM COATED ORAL at 05:07

## 2024-07-15 RX ADMIN — GLYCOPYRROLATE 0.2 MG: 0.2 INJECTION INTRAMUSCULAR; INTRAVENOUS at 09:07

## 2024-07-15 RX ADMIN — LIDOCAINE HYDROCHLORIDE 50 MG: 10 INJECTION, SOLUTION EPIDURAL; INFILTRATION; INTRACAUDAL; PERINEURAL at 09:07

## 2024-07-15 RX ADMIN — PHENYLEPHRINE HYDROCHLORIDE 0.2 MCG/KG/MIN: 10 INJECTION INTRAVENOUS at 09:07

## 2024-07-15 RX ADMIN — DEXTROSE MONOHYDRATE 3 G: 5 INJECTION INTRAVENOUS at 09:07

## 2024-07-15 RX ADMIN — SODIUM CHLORIDE, SODIUM GLUCONATE, SODIUM ACETATE, POTASSIUM CHLORIDE AND MAGNESIUM CHLORIDE: 526; 502; 368; 37; 30 INJECTION, SOLUTION INTRAVENOUS at 09:07

## 2024-07-15 RX ADMIN — BUPIVACAINE HYDROCHLORIDE 1.8 ML: 7.5 INJECTION, SOLUTION EPIDURAL; RETROBULBAR at 09:07

## 2024-07-15 RX ADMIN — ACETAMINOPHEN 1000 MG: 500 TABLET ORAL at 07:07

## 2024-07-15 RX ADMIN — TRAMADOL HYDROCHLORIDE 50 MG: 50 TABLET, COATED ORAL at 08:07

## 2024-07-15 RX ADMIN — ACETAMINOPHEN 1000 MG: 10 INJECTION INTRAVENOUS at 06:07

## 2024-07-15 RX ADMIN — SERTRALINE HYDROCHLORIDE 50 MG: 50 TABLET ORAL at 08:07

## 2024-07-15 RX ADMIN — MIDAZOLAM 2 MG: 1 INJECTION INTRAMUSCULAR; INTRAVENOUS at 09:07

## 2024-07-15 RX ADMIN — ONDANSETRON 4 MG: 4 TABLET, ORALLY DISINTEGRATING ORAL at 07:07

## 2024-07-15 RX ADMIN — CEFAZOLIN 2 G: 2 INJECTION, POWDER, FOR SOLUTION INTRAMUSCULAR; INTRAVENOUS at 11:07

## 2024-07-15 RX ADMIN — TRANEXAMIC ACID 1950 MG: 650 TABLET ORAL at 07:07

## 2024-07-15 RX ADMIN — TRAMADOL HYDROCHLORIDE 50 MG: 50 TABLET, COATED ORAL at 02:07

## 2024-07-15 RX ADMIN — METOCLOPRAMIDE 10 MG: 5 INJECTION, SOLUTION INTRAMUSCULAR; INTRAVENOUS at 02:07

## 2024-07-15 RX ADMIN — ALLOPURINOL 300 MG: 300 TABLET ORAL at 08:07

## 2024-07-15 RX ADMIN — SENNOSIDES AND DOCUSATE SODIUM 2 TABLET: 50; 8.6 TABLET ORAL at 08:07

## 2024-07-15 RX ADMIN — FAMOTIDINE 20 MG: 20 TABLET, FILM COATED ORAL at 08:07

## 2024-07-15 RX ADMIN — ONDANSETRON HYDROCHLORIDE 4 MG: 2 SOLUTION INTRAMUSCULAR; INTRAVENOUS at 10:07

## 2024-07-15 RX ADMIN — POLYETHYLENE GLYCOL 3350 17 G: 17 POWDER, FOR SOLUTION ORAL at 08:07

## 2024-07-15 RX ADMIN — SODIUM CHLORIDE: 9 INJECTION, SOLUTION INTRAVENOUS at 11:07

## 2024-07-15 RX ADMIN — CEFAZOLIN 2 G: 2 INJECTION, POWDER, FOR SOLUTION INTRAMUSCULAR; INTRAVENOUS at 05:07

## 2024-07-15 RX ADMIN — METOCLOPRAMIDE 10 MG: 5 INJECTION, SOLUTION INTRAMUSCULAR; INTRAVENOUS at 08:07

## 2024-07-15 RX ADMIN — KETOROLAC TROMETHAMINE 10 MG: 10 TABLET, FILM COATED ORAL at 07:07

## 2024-07-15 RX ADMIN — SODIUM CHLORIDE, SODIUM GLUCONATE, SODIUM ACETATE, POTASSIUM CHLORIDE AND MAGNESIUM CHLORIDE: 526; 502; 368; 37; 30 INJECTION, SOLUTION INTRAVENOUS at 07:07

## 2024-07-15 NOTE — ANESTHESIA POSTPROCEDURE EVALUATION
Anesthesia Post Evaluation    Patient: Camden Uriarte    Procedure(s) Performed: Procedure(s) (LRB):  ROBOTIC ARTHROPLASTY, KNEE, TOTAL (Left)    Final Anesthesia Type: spinal      Patient location during evaluation: PACU  Patient participation: Yes- Able to Participate  Level of consciousness: oriented and sedated  Post-procedure vital signs: reviewed and stable  Pain management: adequate  Airway patency: patent    PONV status at discharge: No PONV  Anesthetic complications: no      Cardiovascular status: stable and hemodynamically stable  Respiratory status: spontaneous ventilation and unassisted  Hydration status: euvolemic  Follow-up not needed.  Comments: [facility]        NEURO: Neuraxial block resolving. Adductor Canal Blk , placed inPACU      Vitals Value Taken Time   /80 07/15/24 1121   Temp 36 °C (96.8 °F) 07/15/24 1058   Pulse 54 07/15/24 1121   Resp 12 07/15/24 1121   SpO2 100 % 07/15/24 1121   Vitals shown include unfiled device data.      No case tracking events are documented in the log.      Pain/Leyla Score: Pain Rating Prior to Med Admin: 0 (7/15/2024  7:15 AM)  Leyla Score: 8 (7/15/2024 10:54 AM)

## 2024-07-15 NOTE — NURSING
Nurses Note -- 4 Eyes      7/15/2024   6:32 PM      Skin assessed during: Admit      [x] No Altered Skin Integrity Present    []Prevention Measures Documented      [] Yes- Altered Skin Integrity Present or Discovered   [] LDA Added if Not in Epic (Describe Wound)   [] New Altered Skin Integrity was Present on Admit and Documented in LDA   [] Wound Image Taken    Wound Care Consulted? No    Attending Nurse:  Vidya Rivera RN/Staff Member:   Milagro

## 2024-07-15 NOTE — OP NOTE
OPERATIVE REPORT    Patient: Camden Uriarte   : 1945    MRN: [id number]  Date: 07/15/2024      Surgeon: Omer Ibrahim MD  Assistant: Danya Baez NP  Assistant was essential, part of the procedure including positioning, holding multiple retractors, deep hardware placement and deep closure.    Preoperative Diagnosis:  Left knee primary osteoarthritis  Postoperative Diagnosis: Same  Procedure:  L JOSEFA TKA (80135)  Anesthesiologist: Ildefonso Cooper MD  Implants:   Implant Name Type Inv. Item Serial No.  Lot No. LRB No. Used Action   PIN BONE [id number] - [id number]  PIN BONE [id number]  [employer].  Left 1 Implanted and Explanted   PIN FIXATION BONE 140X3.2MM - [id number]  PIN FIXATION BONE 140X3.2MM  [employer].  Left 1 Implanted and Explanted   INSERT TRIATHLON TIB 10MM SZ 7 - [id number]  INSERT TRIATHLON TIB 10MM SZ 7  [employer]. [id number] Left 1 Implanted   BASEPLATE TIBIAL TRIATHLON SZ - [id number]  BASEPLATE TIBIAL TRIATHLON SZ  [employer]. [id number] Left 1 Implanted   cruciate retaining femoral    [employer] [id number] Left 1 Implanted     OR Staff:  Circulator: Hima Gonzalez RN  Nurse Practitioner: Danya Baez FNP  Scrub Person: Beckie Monique ST  EBL: 50  Complications: None  Disposition: To PACU, stable    Indications: Camden Uriarte is a 78 y.o. male presenting with left knee pain.  Patient had tried and failed all conservative measures including injections, activity modification, NSAIDs, and home exercise program.  Risks, benefits, and alternatives discussed with regards to left total knee arthroplasty.  All questions answered to patients satisfaction, no guarantees made.  Despite these risks, the patient agreed to proceed with surgical intervention.     Procedure Note:  The patient was brought back to the OR and placed supine on the OR table. After successful induction of anesthesia by anesthesia  staff, all bony prominences were padded appropriately.  Tourniquet placed to the left upper thigh.  Left knee prepped and draped in normal sterile fashion. At this time, a time-out was performed, with the correct patient, site, and procedure identified.  Preoperative antibiotics were verified as administered.     Leg was exsanguinated using Esmarch tourniquet and tourniquet was inflated to 250 mmHg.  Standard midline parapatellar incision was performed taken through the skin and subcutaneous tissue.  A fresh knife was used to make an arthrotomy midline parapatellar approach.  Proximal medial tibia retinaculam was released from the proximal medial tibial plateau.  Small resection of prepatellar fat pad was performed.  Knee flexed up.  Schanz pins attached to the distal femur as well as distal tibia.  Xcalar robotic arrays attached to both sets of pins.  Checkpoints attached to distal femur and proximal tibia.  Registration performed initially with hip center followed by ankle center.  Registration of both the distal femur and proximal tibia performed using [place] protocol.  We then checked joint balance and ligamentous tension in both extension as well as 90° of flexion.  Adjusted femoral and tibial implants to get appropriate gaps in both extension as well as flexion.  After we were satisfied with implant position as well as volume resection, Xcalar robot was brought in.  Femoral cuts performed followed by tibial cuts.  Care performed to protect all necessary soft tissue structures during bony resection.  Bony fragments and resected and removed from the knee.  Medial and lateral meniscus resected.  Tibial tray placed in the position and pinned.  Trial liner placed in position.  Trial femoral component placed in position and adjusted medially and laterally to the appropriate position.  Knee taken to full range of motion.  Came to full extension.  Excellent flexion greater than 120°.  Stable throughout his range of motion  with no extension instability or flexion instability.  Patella tracked well.    .  Femoral lugs drilled and the femoral trial removed.  We then punched the tibia with a tibial tower.  Tibial trial then removed.  Implants opened.  Tibia component implanted, followed by the polyethylene liner.  Femoral component then implanted.  Patella was everted and lugs drilled and the patella was also implanted.  At this point brought the knee to full range of motion yet again.  Again excellent range of motion from full extension greater than 120° of flexion was noted with no instability at full flexion, mid flexion and extension.  Checkpoints removed as well as pins.    The tourniquet was dropped.  All bleeders were coagulated.  Injection of joint cocktail periarticularly.  Copious irrigation with normal saline performed along with Betadine lavage followed by more normal saline.  We then turned our attention towards closure.  Arthrotomy closed with #1 Vicryl as well as #1 Stratafix suture.  Subcutaneous tissue closed with 2-0 Monocryl.  Followed by skin closure.    Patient arose from anesthesia without any issue and was then subsequently transferred to to PACU in a stable condition.    All sponge and needle counts were correct at the end of the case.  I was present and participated in all aspects of the procedure.    Prognosis:  The patient will be weight-bearing as tolerated to the right lower extremity with range of motion with physical therapy.  Patient will receive DVT prophylaxis .   plan discharge home versus a facility once the patient is stable with physical therapy guidelines.      This note/OR report was created with the assistance of  voice recognition software or phone  dictation.  There may be transcription errors as a result of using this technology however minimal. Effort has been made to assure accuracy of transcription but any obvious errors or omissions should be clarified with the author of the document.

## 2024-07-15 NOTE — PT/OT/SLP EVAL
Physical Therapy Evaluation    Patient Name:  Camden Uriarte   MRN:  [id number]    Recommendations:     Discharge Recommendations: Low Intensity Therapy   Discharge Equipment Recommendations: walker, rolling   Barriers to discharge: None    Assessment:     Camedn Uriarte is a 78 y.o. male admitted with a medical diagnosis of Primary osteoarthritis of left knee.  He presents with the following impairments/functional limitations: weakness, impaired endurance, impaired functional mobility, decreased lower extremity function, pain, decreased ROM, edema, orthopedic precautions .    Rehab Prognosis: Good; patient would benefit from acute skilled PT services to address these deficits and reach maximum level of function.    Recent Surgery: Procedure(s) (LRB):  ROBOTIC ARTHROPLASTY, KNEE, TOTAL (Left) Day of Surgery    Plan:     During this hospitalization, patient to be seen BID to address the identified rehab impairments via gait training, therapeutic activities, therapeutic exercises and progress toward the following goals:    Plan of Care Expires:  07/19/24    Subjective     Chief Complaint: L knee pain  Patient/Family Comments/goals:   Pain/Comfort:  Location - Side 1: Left  Location 1: knee  Pain Addressed 1: Pre-medicate for activity, Reposition, Distraction, Cessation of Activity    Patients cultural, spiritual, Yarsanism conflicts given the current situation:      Living Environment:  Pt lives in single story home with wife, only small threshold to enter.  Prior to admission, patients level of function was mod ind.  Equipment used at home: other (see comments) (walking stick).  DME owned (not currently used): rolling walker.  Upon discharge, patient will have assistance from wife.    Objective:     Communicated with nurse prior to session.  Patient found supine with peripheral IV, telemetry  upon PT entry to room.    General Precautions: Standard, fall  Orthopedic Precautions:LLE weight bearing as tolerated   Braces:     Respiratory Status: Room air    Exams:  RLE ROM: WFL  RLE Strength: WFL  LLE ROM:     (In degrees) AROM PROM   L knee flexion 75 80   L knee extension 1 0      LLE Strength: NT dt sx side    Functional Mobility:  Bed Mobility:     Supine to Sit: minimum assistance  Transfers:     Sit to Stand:  contact guard assistance with rolling walker  Gait: Pt ambulated 180 ft w rw and CGA, using step through gait pattern at slow pace. Pt required frequent cues for keeping L foot tracking forward and increasing L knee flexion         Treatment & Education:  Pt edu on total knee protocol and importance of frequent mobility  Pt did not complete prehab prior to sx    Patient left up in chair with all lines intact, call button in reach, nurse notified    GOALS:   Multidisciplinary Problems       Physical Therapy Goals          Problem: Physical Therapy    Goal Priority Disciplines Outcome Goal Variances Interventions   Physical Therapy Goal     PT, PT/OT Progressing     Description: Pt will improve functional independence by performing:    Bed mobility: SBA  Sit to stand: SBA with rolling walker  Bed to chair: SBA with Stand Step  with rolling walker   Car Transfer: SBA with rolling walker  Ambulation x 200'  feet with SBA and rolling walker  1 Step (Curb): Min A  and rolling walker  3 Steps: Min A  and B HR  left knee AROM flexion (in degrees): 90  left knee AROM extension (in degrees): 0   Independent with total knee HEP                          History:     Past Medical History:   Diagnosis Date    Anemia, unspecified     Arthritis     Cancer     Lips    COPD (chronic obstructive pulmonary disease)     Dr. ESDRAS Jules lung MD said no issue    Elevated cholesterol     Gout     Hypertension     Renal disorder     kidney stones    Sleep apnea     cpap    Thrombosis 2022    Lungs, from right knee surgery       Past Surgical History:   Procedure Laterality Date    APPENDECTOMY      CATARACT EXTRACTION W/  INTRAOCULAR LENS  IMPLANT Bilateral     COLONOSCOPY      CYSTOSCOPY W/ LASER LITHOTRIPSY  07/02/2024    DENTAL SURGERY      gastric sleeve  2016    ROBOTIC ARTHROPLASTY, KNEE Right 10/10/2022    Omer Ibrahim MD; pt had PE after surgery    TOTAL SHOULDER ARTHROPLASTY Right 2018    Dr. Ceja       Time Tracking:     PT Received On:    PT Start Time: 1502     PT Stop Time: 1524  PT Total Time (min): 22 min     Billable Minutes: Evaluation 22      07/15/2024

## 2024-07-15 NOTE — PLAN OF CARE
Problem: Physical Therapy  Goal: Physical Therapy Goal  Description: Pt will improve functional independence by performing:    Bed mobility: SBA  Sit to stand: SBA with rolling walker  Bed to chair: SBA with Stand Step  with rolling walker   Car Transfer: SBA with rolling walker  Ambulation x 200'  feet with SBA and rolling walker  1 Step (Curb): Min A  and rolling walker  3 Steps: Min A  and B HR  left knee AROM flexion (in degrees): 90  left knee AROM extension (in degrees): 0   Independent with total knee HEP     Outcome: Progressing

## 2024-07-15 NOTE — ANESTHESIA PROCEDURE NOTES
"Spinal    Diagnosis: Osteoarthritis, Left Knee  Patient location during procedure: OR  Start time: 7/15/2024 9:09 AM  Timeout: 7/15/2024 9:04 AM  End time: 7/15/2024 9:15 AM    Staffing  Authorizing Provider: Ildefonso Cooper MD  Performing Provider: Ildefonso Cooper MD    Staffing  Performed by: Ildefonso Cooper MD  Authorized by: Ildefonso Cooper MD    Preanesthetic Checklist  Completed: patient identified, IV checked, site marked, risks and benefits discussed, surgical consent, monitors and equipment checked, pre-op evaluation and timeout performed  Spinal Block  Patient position: sitting  Prep: ChloraPrep  Patient monitoring: heart rate, continuous pulse ox, frequent blood pressure checks and cardiac monitor  Approach: midline  Location: L3-4  Injection technique: single shot  CSF Fluid: clear free-flowing CSF  Needle  Needle type: pencil-tip   Needle gauge: 25 G  Needle length: 3.5 in  Additional Documentation: incremental injection, negative aspiration for heme and no paresthesia on injection  Needle localization: anatomical landmarks  Assessment  Sensory level: T7   Dermatomal levels determined by pinch or prick  Ease of block: moderate (3.5 " spinal needle placed to the hub before clear csf flow)  Patient's tolerance of the procedure: comfortable throughout block and no complaints  Medications:    Medications: bupivacaine (pf) (MARCAINE) injection 0.75% - Intraspinal   1.8 mL - 7/15/2024 9:15:00 AM          "

## 2024-07-15 NOTE — PLAN OF CARE
Problem: Adult Inpatient Plan of Care  Goal: Plan of Care Review  7/15/2024 1652 by Vidya Cohn RN  Outcome: Progressing  7/15/2024 1342 by Vidya Cohn RN  Outcome: Progressing  Goal: Patient-Specific Goal (Individualized)  7/15/2024 1652 by Vidya Cohn RN  Outcome: Progressing  7/15/2024 1342 by Vidya Cohn RN  Outcome: Progressing  Goal: Absence of Hospital-Acquired Illness or Injury  7/15/2024 1652 by Vidya Cohn RN  Outcome: Progressing  7/15/2024 1342 by Vidya Cohn RN  Outcome: Progressing  Goal: Optimal Comfort and Wellbeing  7/15/2024 1652 by Vidya Cohn RN  Outcome: Progressing  7/15/2024 1342 by Vidya Cohn RN  Outcome: Progressing  Goal: Readiness for Transition of Care  7/15/2024 1652 by Vidya Cohn RN  Outcome: Progressing  7/15/2024 1342 by Vidya Cohn RN  Outcome: Progressing     Problem: Wound  Goal: Optimal Coping  7/15/2024 1652 by Vidya Cohn RN  Outcome: Progressing  7/15/2024 1342 by Viday Cohn RN  Outcome: Progressing  Goal: Optimal Functional Ability  7/15/2024 1652 by Vidya Cohn RN  Outcome: Progressing  7/15/2024 1342 by Vidya Cohn RN  Outcome: Progressing  Goal: Absence of Infection Signs and Symptoms  7/15/2024 1652 by Vidya Cohn RN  Outcome: Progressing  7/15/2024 1342 by Vidya Cohn, RN  Outcome: Progressing  Goal: Improved Oral Intake  7/15/2024 1652 by Vidya Cohn, RN  Outcome: Progressing  7/15/2024 1342 by Vidya Cohn, RN  Outcome: Progressing  Goal: Optimal Pain Control and Function  7/15/2024 1652 by Vidya Cohn RN  Outcome: Progressing  7/15/2024 1342 by Vidya Cohn RN  Outcome: Progressing  Goal: Skin Health and Integrity  7/15/2024 1652 by Vidya Cohn RN  Outcome: Progressing  7/15/2024 1342 by Vidya Cohn, RN  Outcome: Progressing  Goal: Optimal Wound Healing  7/15/2024 1652 by Vidya Cohn, RN  Outcome: Progressing  7/15/2024 1342 by Vidya Cohn, RN  Outcome: Progressing     Problem: Infection  Goal: Absence of Infection Signs and  Symptoms  7/15/2024 1652 by Vidya Cohn RN  Outcome: Progressing  7/15/2024 1342 by Vidya Cohn RN  Outcome: Progressing     Problem: Comorbidity Management  Goal: Maintenance of COPD Symptom Control  7/15/2024 1652 by Vidya Cohn RN  Outcome: Progressing  7/15/2024 1342 by Vidya Cohn RN  Outcome: Progressing  Goal: Blood Pressure in Desired Range  7/15/2024 1652 by Vidya Cohn RN  Outcome: Progressing  7/15/2024 1342 by Vidya Cohn RN  Outcome: Progressing     Problem: Diabetes Comorbidity  Goal: Blood Glucose Level Within Targeted Range  Outcome: Progressing

## 2024-07-15 NOTE — DISCHARGE INSTRUCTIONS
[facility]  [address] [place] [address] [id number]  [place] [address]  Phone [phone]       /      Fax [phone]  SURGEON: Dr. Ibrahim    After discharge, all questions or concerns should be handled at your surgeon's office ([phone]). If it is a weekend or after hours, you will get the surgeon on call.     Discharge Medications:    PAIN MANAGEMENT: Next Dose Available   Tylenol/Acetaminophen 500mg- every 4 hours, around the clock (WHILE AWAKE) 6:00pm   Ultram/Tramadol 50mg (Pain Med) - every 4-6 hours AS NEEDED for pain 6:35pm   Robaxin/Methocarbamol 750mg (Muscle Relaxer) - Every 6-8 hours AS NEEDED for muscle spasms, thigh pain or additional pain control 6:00pm   COMPLICATION PREVENTION MEDS: Next Dose DUE   Eliquis/Apixaban 2.5mg (Blood thinner) twice a day for 30 days.  AM on 7/18/24   Miralax or Senokot S/Yandy-Colace 8.6/50mg - 2 tablets once or twice a day while on narcotics and muscle relaxers for constipation prevention PM on 7/18/24   NOZIN NASAL  - twice a day for 2 weeks or until supply runs out, whichever comes first (Infection prevention) PM on 7/18/24   Duricef/Cefadroxil 500mg (Antibiotic) - twice a day for 7 days post-op for infection prevention. PM on 7/17/24     Total Knee Replacement                                                                                                                                    PAIN MEDICATIONS/PAIN MANAGEMENT: (Use the medication log in your discharge packet to keep track of your medications)  While on Eliquis, NO anti-inflammatories (Ibuprofen, Aleve, Motrin, Naproxen, Mobic/Meloxicam, Toradol/Ketorolac, Celebrex, Diclofenac/Voltaren...etc) for 2 weeks or until the wound is healed.    Tylenol/Acetaminophen 500mg every 4 hours, around the clock (WHILE AWAKE).   Take Ultram (Tramadol) 50mg (pain pill) every 4-6 hours as needed for pain.    **NO MORE THAN 3000mg OF TYLENOL IN 24 HOURS**.     Robaxin/Methocarbamol 750mg  (muscle relaxer)- you can take every 6-8 hours as needed for muscle spasms, thigh pain and stiffness, additional pain control or breakthrough pain medications. This medication is helpful for pain control while lessening your need for narcotics. Please reduce the use gradually as the pain and spasms lessen. DO NOT TAKE AT THE SAME TIME AS A PAIN PILL. YOU WILL BE BETTER SERVED WITH 2 HOURS BETWEEN PAIN PILL AND MUSCLE RELAXER.     **Other things that help with pain control is WALKING, COMPRESSION WRAP, ICE and ELEVATION!!**    BLOOD CLOT PREVENTION:   Eliquis 2.5 mg twice a day for 30 days postop. Start AM on 7/18/24. Complete Prescription.  If you were taking Baby Aspirin 81mg prior to surgery, may return to daily AFTER ELIQUIS IS COMPLETE .  You need to continuing wearing your compression stockings (PATITO Hose - ThromboEmbolic Disease Prevention Device) for the next 2-6 weeks post-op. It is ok to remove them for hygiene and at bedtime.   Hand wash and Dry. **If the swelling persists in the legs after you stop wearing the PATITO hose, continue to wear them until the swelling decreases.**  REMOVE STOCKINGS AT LEAST DAILY FOR SKIN ASSESSMENT.   Do NOT let the stockings roll down, creating a tourniquet around the back of your knee or ankle. If you need to, leave the excess at the bottom of the stocking.   The best thing you can do to prevent blood clots is to walk around as much as possible, AT LEAST EVERY 1-2 HOURS.       CONSTIPATION PREVENTION:   Miralax or Senokot S/Yandy-Colace and Stool softeners EVERY DAY while on pain meds.  Use other more aggressive over the counter LAXATIVES as needed for constipation (Examples: Milk of Magnesia, Dulcolax tabs or suppository, Magnesium Citrate, Fleet's Enema...etc.)   Drink lots of water.  Increase Fiber in diet.  Increase walking distance each day  DO NOT GO MORE THAN 2 DAYS WITHOUT HAVING A BOWEL MOVEMENT!    ACTIVITY:   Weight bearing precautions as follows:  FULL weight bearing  to operative leg with walker.   DO NOT TAKE YOURSELF OFF OF THE WALKER TOO SOON. ALLOW YOUR OUTPATIENT THERAPIST or SURGEON TO GUIDE YOU.   Range of motion as tolerated. Work on BENDING and STRAIGHTENING your knee. Change positions often throughout the day. DO NOT PUT ANYTHING BEHIND THE KNEE, KEEPING IT IN A BENT POSITION.   Walk around at least every 1-2 hours while awake.   No heavy lifting, pulling, pushing or straining. Take it easy!  Outpatient Physical Therapy - Bring prescription to clinic of choice as soon as possible.      SWELLING PREVENTION AND TREATMENT:  Elevate affected extremity way ABOVE THE LEVEL OF THE HEART to reduce swelling (15-30 minutes at a time, 3 times a day).  Ice the Knee, thigh and lower leg AS MUCH AS POSSIBLE  Compression stockings and ace wrap around the knee and thigh as much as possible. Ok to remove at night for comfort.     WOUND CARE:     Dry dressing changes every other day and as needed for soiling for 14 days. Start 7/18/24. Being on Eliquis, You may need to change the dressing daily if the wound is draining. Switch to every other day as soon as possible. NOTIFY MD OF EXCESSIVE WOUND DRAINAGE.     DO NOT TOUCH INCISION(s). DO NOT WET THE INCISION(s). DO NOT apply any ointments, creams, lotions or antiseptics to the incision(s).   Ace wrap - apply your compression stocking to the lower leg and apply the ace wrap where the stocking stops for extra added compression to the knee and thigh.   May wet incision AFTER 2 weeks- (after you follow-up with your surgeon). Ok to shower before then if able to keep wound from getting wet (plastic barrier, saran wrap or cling wrap and tape).       URINARY RETENTION:  If you start having difficulty urinating, decrease the use of Pain pills and muscle relaxers and notify your primary care doctor.     PNEUMONIA PREVENTION:  Stay out of bed as much as possible and walk around every 1-2 hours.  Continue breathing exercises (Incentive Spirometry)  every 1-2 hours while mobility is limited and while you are on pain pills.    FALL PREVENTION:  Wear sturdy shoes that fit well - Wearing shoes with high heels or slippery soles, or shoes that are too loose, can lead to falls. Walking around in bare feet, or only socks, can also increase your risk of falling.  Use walker as long as your surgeon and therapist recommend it  Use good lighting and  throw rugs, electrical cords, furniture and clutter (anything than can cause you to trip at home.   Non-slip rug in bathroom or shower      INFECTION PREVENTION:  NOZIN ANTISEPTIC NASAL  - twice a day for 2 weeks or until supply runs out, whichever comes first. Shake bottle well, saturate cotton swab with 4 drops of antiseptic solution. Swab right nostril rim 6-8 times clockwise and counterclockwise. Take swab out, apply 2 more drops then swab left nostril rim 6-8 times clockwise and counterclockwise.   Duricef/Cefadroxil 500mg (Antibiotic) -  take twice a day for 7 days to prevent infection   Proper handwashing before and after dressing changes. Do not wet the wound. Wound care instructions as written above. NOTIFY MD OF EXCESSIVE WOUND DRAINAGE.  No alcohol, smoking or tobacco products  Pets should not be allowed around the wound or the dressing.   Treat UTI and skin infections as soon as possible.  Pre-medicate with antibiotics prior to dental or surgical procedures.   If you are diabetic, MAINTAIN GOOD BLOOD SUGAR CONTROL (Below 150) DURING YOUR RECOVERY. If you see high numbers, notify your primary care doctor.     Call your SURGEON'S OFFICE ([phone]) if you experience the following signs and symptoms of infection:   Unusual redness, swelling, excessive, cloudy or foul smelling drainage at the incision site.   Persistent low grade temp OR a temp greater than 102 F, unrelieved by Tylenol  Pain at surgical site, unrelieved by pain meds    Warning signs of a blood clot in your leg: (CALL YOUR SURGEON)  New  onset or increasing pain in calf, new onset tenderness or redness above or below the knee or increasing swelling of your calf, ankle, or foot.  Warning signs that a blood clot has traveled to your lungs: (REPORT TO THE ER/CALL 701)  Sudden or increase in Shortness of breath, sudden onset of chest pains, or  Localized chest pain with coughing.       IF ANY ISSUES ARISE AND YOU FEEL THE NEED TO CALL YOUR PRIMARY CARE DOCTOR, PLEASE LET YOUR SURGEON KNOW AS WELL.     For emergencies, please report to OUR ([facility] [place]) Emergency department and tell them to call YOUR SURGEON at [phone].     BEFORE MAKING ANY CHANGES TO THE MEDICAL CARE PLAN OR GOING TO THE EMERGENCY ROOM, PLEASE CONTACT THE SURGEON.      After discharge, all questions or concerns should be handled at your surgeon's office ([phone]). If it is a weekend or after hours, you will get the surgeon on call.

## 2024-07-15 NOTE — ANESTHESIA PROCEDURE NOTES
Peripheral Block    Patient location during procedure: post-op   Block not for primary anesthetic.  Reason for block: at surgeon's request and post-op pain management   Post-op Pain Location: Knee, Left   Start time: 7/15/2024 11:14 AM  Timeout: 7/15/2024 11:12 AM   End time: 7/15/2024 11:15 AM    Staffing  Authorizing Provider: Ildefonso Cooper MD  Performing Provider: Ildefonso Cooper MD    Staffing  Performed by: Ildefonso Cooper MD  Authorized by: Ildefonso Cooper MD    Preanesthetic Checklist  Completed: patient identified, IV checked, site marked, risks and benefits discussed, surgical consent, monitors and equipment checked, pre-op evaluation and timeout performed  Peripheral Block  Patient position: supine  Prep: ChloraPrep  Patient monitoring: heart rate, cardiac monitor, continuous pulse ox and frequent blood pressure checks  Block type: adductor canal  Laterality: left  Injection technique: single shot  Needle  Needle type: Stimuplex   Needle gauge: 20 G  Needle length: 4 in  Needle localization: ultrasound guidance, anatomical landmarks and nerve stimulator   -ultrasound image captured on disc.  Assessment  Injection assessment: negative aspiration, negative parasthesia and local visualized surrounding nerve  Paresthesia pain: none  Heart rate change: no  Slow fractionated injection: yes  Pain Tolerance: comfortable throughout block and no complaints  Medications:    Medications: bupivacaine (pf) (MARCAINE) injection 0.25% - Perineural   30 mL - 7/15/2024 11:14:00 AM    Additional Notes  VSS.  RN monitoring vitals throughout procedure.  Patient tolerated procedure well.    Ultrasound guidance used to visualize the nerve bundle and sheath as well as confirm needle placement and deposition of the local anesthetic.  (1) Under ultrasound guidance, needle was inserted and placed in close proximity to the nerve bundle  (2) Ultrasound was also used to visualize the spread of the anesthetic in close  proximity to the femoral artery  (3) The nerves appeared anatomically normal. No appropriate neuro-stimulator twitch detected @ 1.0 mA.  (4) There were no apparent abnormal pathological findings    Ultrasound photos archived.  Pt tolerated procedure well. There were no immediate complications.

## 2024-07-15 NOTE — TRANSFER OF CARE
"Anesthesia Transfer of Care Note    Patient: Camden Uriarte    Procedure(s) Performed: Procedure(s) (LRB):  ROBOTIC ARTHROPLASTY, KNEE, TOTAL (Left)    Patient location: PACU    Anesthesia Type: spinal    Transport from OR: Transported from OR on room air with adequate spontaneous ventilation    Post pain: adequate analgesia    Post assessment: no apparent anesthetic complications    Post vital signs: stable    Level of consciousness: awake    Nausea/Vomiting: no nausea/vomiting    Complications: none    Transfer of care protocol was followed      Last vitals: Visit Vitals  BP (!) 142/77   Pulse 61   Temp 36 °C (96.8 °F)   Resp 19   Ht 5' 10" (1.778 m)   Wt 124.9 kg (275 lb 5.7 oz)   SpO2 100%   BMI 39.51 kg/m²     "

## 2024-07-15 NOTE — PROGRESS NOTES
Patient experiencing a delayed response to anesthesia. Will convert to observation status, Will initiate our pre-emptive multimodal pain mgt protocol, provide post-anesthesia monitoring and perform frequent pain assessments. Will plan for discharge once the patient is tolerating pain and pain medications appropriately and the patient has been cleared from a safety/mobility standpoint.

## 2024-07-15 NOTE — PLAN OF CARE
Problem: Adult Inpatient Plan of Care  Goal: Plan of Care Review  Outcome: Progressing  Goal: Patient-Specific Goal (Individualized)  Outcome: Progressing  Goal: Absence of Hospital-Acquired Illness or Injury  Outcome: Progressing  Goal: Optimal Comfort and Wellbeing  Outcome: Progressing  Goal: Readiness for Transition of Care  Outcome: Progressing     Problem: Wound  Goal: Optimal Coping  Outcome: Progressing  Goal: Optimal Functional Ability  Outcome: Progressing  Goal: Absence of Infection Signs and Symptoms  Outcome: Progressing  Goal: Improved Oral Intake  Outcome: Progressing  Goal: Optimal Pain Control and Function  Outcome: Progressing  Goal: Skin Health and Integrity  Outcome: Progressing  Goal: Optimal Wound Healing  Outcome: Progressing     Problem: Infection  Goal: Absence of Infection Signs and Symptoms  Outcome: Progressing     Problem: Comorbidity Management  Goal: Maintenance of COPD Symptom Control  Outcome: Progressing  Goal: Blood Pressure in Desired Range  Outcome: Progressing

## 2024-07-16 LAB
ANION GAP SERPL CALC-SCNC: 7 MEQ/L
BUN SERPL-MCNC: 19.2 MG/DL (ref 8.4–25.7)
CALCIUM SERPL-MCNC: 8.5 MG/DL (ref 8.8–10)
CHLORIDE SERPL-SCNC: 110 MMOL/L (ref 98–107)
CO2 SERPL-SCNC: 25 MMOL/L (ref 23–31)
CREAT SERPL-MCNC: 1.06 MG/DL (ref 0.73–1.18)
CREAT/UREA NIT SERPL: 18
ERYTHROCYTE [DISTWIDTH] IN BLOOD BY AUTOMATED COUNT: 13.4 % (ref 11.5–17)
GFR SERPLBLD CREATININE-BSD FMLA CKD-EPI: >60 ML/MIN/1.73/M2
GLUCOSE SERPL-MCNC: 178 MG/DL (ref 82–115)
HCT VFR BLD AUTO: 36 % (ref 42–52)
HGB BLD-MCNC: 12.3 G/DL (ref 14–18)
MCH RBC QN AUTO: 29.9 PG (ref 27–31)
MCHC RBC AUTO-ENTMCNC: 34.2 G/DL (ref 33–36)
MCV RBC AUTO: 87.6 FL (ref 80–94)
NRBC BLD AUTO-RTO: 0 %
PLATELET # BLD AUTO: 131 X10(3)/MCL (ref 130–400)
PLATELETS.RETICULATED NFR BLD AUTO: 2.6 % (ref 0.9–11.2)
PMV BLD AUTO: 10.5 FL (ref 7.4–10.4)
POTASSIUM SERPL-SCNC: 4.4 MMOL/L (ref 3.5–5.1)
RBC # BLD AUTO: 4.11 X10(6)/MCL (ref 4.7–6.1)
SODIUM SERPL-SCNC: 142 MMOL/L (ref 136–145)
WBC # BLD AUTO: 13.26 X10(3)/MCL (ref 4.5–11.5)

## 2024-07-16 PROCEDURE — 63600175 PHARM REV CODE 636 W HCPCS: Performed by: ORTHOPAEDIC SURGERY

## 2024-07-16 PROCEDURE — 97530 THERAPEUTIC ACTIVITIES: CPT | Mod: CQ

## 2024-07-16 PROCEDURE — 97110 THERAPEUTIC EXERCISES: CPT | Mod: CQ

## 2024-07-16 PROCEDURE — 80048 BASIC METABOLIC PNL TOTAL CA: CPT | Performed by: ORTHOPAEDIC SURGERY

## 2024-07-16 PROCEDURE — 63600175 PHARM REV CODE 636 W HCPCS: Performed by: NURSE PRACTITIONER

## 2024-07-16 PROCEDURE — 96372 THER/PROPH/DIAG INJ SC/IM: CPT | Performed by: NURSE PRACTITIONER

## 2024-07-16 PROCEDURE — 94761 N-INVAS EAR/PLS OXIMETRY MLT: CPT

## 2024-07-16 PROCEDURE — 99900031 HC PATIENT EDUCATION (STAT)

## 2024-07-16 PROCEDURE — 11000001 HC ACUTE MED/SURG PRIVATE ROOM

## 2024-07-16 PROCEDURE — 25000003 PHARM REV CODE 250: Performed by: NURSE PRACTITIONER

## 2024-07-16 PROCEDURE — 94799 UNLISTED PULMONARY SVC/PX: CPT

## 2024-07-16 PROCEDURE — 25000003 PHARM REV CODE 250: Performed by: ORTHOPAEDIC SURGERY

## 2024-07-16 PROCEDURE — 85027 COMPLETE CBC AUTOMATED: CPT | Performed by: ORTHOPAEDIC SURGERY

## 2024-07-16 PROCEDURE — 36415 COLL VENOUS BLD VENIPUNCTURE: CPT | Performed by: ORTHOPAEDIC SURGERY

## 2024-07-16 RX ORDER — ENOXAPARIN SODIUM 100 MG/ML
40 INJECTION SUBCUTANEOUS DAILY
Status: DISCONTINUED | OUTPATIENT
Start: 2024-07-16 | End: 2024-07-17 | Stop reason: HOSPADM

## 2024-07-16 RX ORDER — CEFADROXIL 500 MG/1
500 CAPSULE ORAL EVERY 12 HOURS
Status: DISCONTINUED | OUTPATIENT
Start: 2024-07-16 | End: 2024-07-17 | Stop reason: HOSPADM

## 2024-07-16 RX ORDER — METOCLOPRAMIDE 10 MG/1
10 TABLET ORAL EVERY 6 HOURS
Status: COMPLETED | OUTPATIENT
Start: 2024-07-16 | End: 2024-07-17

## 2024-07-16 RX ADMIN — SERTRALINE HYDROCHLORIDE 50 MG: 50 TABLET ORAL at 08:07

## 2024-07-16 RX ADMIN — ACETAMINOPHEN 500 MG: 500 TABLET ORAL at 05:07

## 2024-07-16 RX ADMIN — SENNOSIDES AND DOCUSATE SODIUM 2 TABLET: 50; 8.6 TABLET ORAL at 08:07

## 2024-07-16 RX ADMIN — ACETAMINOPHEN 500 MG: 500 TABLET ORAL at 02:07

## 2024-07-16 RX ADMIN — TRAMADOL HYDROCHLORIDE 50 MG: 50 TABLET, COATED ORAL at 12:07

## 2024-07-16 RX ADMIN — CARVEDILOL 12.5 MG: 6.25 TABLET, FILM COATED ORAL at 05:07

## 2024-07-16 RX ADMIN — TRAMADOL HYDROCHLORIDE 50 MG: 50 TABLET, COATED ORAL at 06:07

## 2024-07-16 RX ADMIN — TAMSULOSIN HYDROCHLORIDE 0.4 MG: 0.4 CAPSULE ORAL at 08:07

## 2024-07-16 RX ADMIN — FAMOTIDINE 20 MG: 20 TABLET, FILM COATED ORAL at 08:07

## 2024-07-16 RX ADMIN — SERTRALINE HYDROCHLORIDE 100 MG: 50 TABLET ORAL at 08:07

## 2024-07-16 RX ADMIN — CEFAZOLIN 2 G: 2 INJECTION, POWDER, FOR SOLUTION INTRAMUSCULAR; INTRAVENOUS at 04:07

## 2024-07-16 RX ADMIN — ALLOPURINOL 300 MG: 300 TABLET ORAL at 08:07

## 2024-07-16 RX ADMIN — POLYETHYLENE GLYCOL 3350 17 G: 17 POWDER, FOR SOLUTION ORAL at 08:07

## 2024-07-16 RX ADMIN — DOCUSATE SODIUM 200 MG: 100 CAPSULE, LIQUID FILLED ORAL at 05:07

## 2024-07-16 RX ADMIN — TRAMADOL HYDROCHLORIDE 50 MG: 50 TABLET, COATED ORAL at 04:07

## 2024-07-16 RX ADMIN — LISINOPRIL 40 MG: 10 TABLET ORAL at 08:07

## 2024-07-16 RX ADMIN — METOCLOPRAMIDE 10 MG: 10 TABLET ORAL at 08:07

## 2024-07-16 RX ADMIN — TRAMADOL HYDROCHLORIDE 50 MG: 50 TABLET, COATED ORAL at 08:07

## 2024-07-16 RX ADMIN — CEFADROXIL 500 MG: 500 CAPSULE ORAL at 08:07

## 2024-07-16 RX ADMIN — METHOCARBAMOL 750 MG: 750 TABLET ORAL at 06:07

## 2024-07-16 RX ADMIN — ACETAMINOPHEN 500 MG: 500 TABLET ORAL at 08:07

## 2024-07-16 RX ADMIN — METOCLOPRAMIDE 10 MG: 10 TABLET ORAL at 06:07

## 2024-07-16 RX ADMIN — TRAMADOL HYDROCHLORIDE 50 MG: 50 TABLET, COATED ORAL at 10:07

## 2024-07-16 RX ADMIN — CARVEDILOL 12.5 MG: 6.25 TABLET, FILM COATED ORAL at 08:07

## 2024-07-16 RX ADMIN — METHOCARBAMOL 750 MG: 750 TABLET ORAL at 11:07

## 2024-07-16 RX ADMIN — ENOXAPARIN SODIUM 40 MG: 40 INJECTION SUBCUTANEOUS at 10:07

## 2024-07-16 RX ADMIN — METOCLOPRAMIDE 10 MG: 5 INJECTION, SOLUTION INTRAMUSCULAR; INTRAVENOUS at 03:07

## 2024-07-16 RX ADMIN — KETOROLAC TROMETHAMINE 10 MG: 10 TABLET, FILM COATED ORAL at 05:07

## 2024-07-16 RX ADMIN — ACETAMINOPHEN 500 MG: 500 TABLET ORAL at 09:07

## 2024-07-16 RX ADMIN — ACETAMINOPHEN 500 MG: 500 TABLET ORAL at 03:07

## 2024-07-16 NOTE — NURSING
Nurses Note -- 4 Eyes      7/15/2024   11:11 PM      Skin assessed during: Q Shift Change      [x] No Altered Skin Integrity Present    []Prevention Measures Documented      [] Yes- Altered Skin Integrity Present or Discovered   [] LDA Added if Not in Epic (Describe Wound)   [] New Altered Skin Integrity was Present on Admit and Documented in LDA   [] Wound Image Taken    Wound Care Consulted? No    Attending Nurse:  Enrique Rivera RN/Staff Member:   moon

## 2024-07-16 NOTE — PLAN OF CARE
07/16/24 1325   Discharge Assessment   Assessment Type Discharge Planning Assessment   Source of Information patient   Communicated YUE with patient/caregiver Yes   Reason For Admission s/p TKR   People in Home spouse   Do you expect to return to your current living situation? Yes   Do you have help at home or someone to help you manage your care at home? Yes   Who are your caregiver(s) and their phone number(s)? WIFE   Prior to hospitilization cognitive status: Alert/Oriented   Current cognitive status: Alert/Oriented   Walking or Climbing Stairs Difficulty yes   Walking or Climbing Stairs ambulation difficulty, requires equipment   Dressing/Bathing Difficulty yes   Dressing/Bathing bathing difficulty, requires equipment;dressing difficulty, requires equipment   Equipment Currently Used at Home CPAP;walker, rolling;cane, straight   Readmission within 30 days? No   Patient currently being followed by outpatient case management? No   Do you currently have service(s) that help you manage your care at home? No   Do you take prescription medications? Yes   Do you have prescription coverage? Yes   Do you have any problems affording any of your prescribed medications? No   Is the patient taking medications as prescribed? yes   Who is going to help you get home at discharge? WIFE - JORGE [phone]   How do you get to doctors appointments? car, drives self   Are you on dialysis? No   Do you take coumadin? No   Discharge Plan A Home with family   Discharge Plan B Home with family   DME Needed Upon Discharge  walker, rolling   Discharge Plan discussed with: Patient   Transition of Care Barriers Mobility     S/p TKR. Spk w pt  ... WifeJorge to asst w homecare. Pt has RW. Provider list given. Foc obtained.   Called referral for outpatient therapy to [facility]. They will contact pt with appt date & time.   PCP: Dr Slick Sanchez  Contact # Jorge [phone]      Patient DID NOT participate in a pre-op  exercise regimen

## 2024-07-16 NOTE — PT/OT/SLP PROGRESS
"Physical Therapy Treatment    Patient Name:  Camden Uriarte   MRN:  [id number]    Recommendations:     Discharge Recommendations: Low Intensity Therapy  Discharge Equipment Recommendations: walker, rolling  Barriers to discharge: None    Assessment:     Camden Uriarte is a 78 y.o. male admitted with a medical diagnosis of Primary osteoarthritis of left knee.  He presents with the following impairments/functional limitations: weakness, impaired endurance, impaired functional mobility, decreased lower extremity function, pain, decreased ROM, edema, orthopedic precautions .    Rehab Prognosis: Good; patient would benefit from acute skilled PT services to address these deficits and reach maximum level of function.    Recent Surgery: Procedure(s) (LRB):  ROBOTIC ARTHROPLASTY, KNEE, TOTAL (Left) 1 Day Post-Op    Plan:     During this hospitalization, patient to be seen BID to address the identified rehab impairments via gait training, therapeutic activities, therapeutic exercises and progress toward the following goals:    Plan of Care Expires:  07/19/24    Subjective     Chief Complaint: pain in thigh  Patient/Family Comments/goals: go home  Pain/Comfort:         Objective:     Communicated with rn prior to session.  Patient found supine with   upon PT entry to room.     General Precautions: Standard, fall  Orthopedic Precautions: LLE weight bearing as tolerated  Braces:    Respiratory Status: Room air     Functional Mobility:  Bed Mobility:     Supine to Sit: supervision  Transfers:     Sit to Stand:  supervision with rolling walker  Bed to Chair: supervision with  rolling walker  using  Step Transfer  Car Transfer: contact guard assistance with  rolling walker  using  Step Transfer  Gait: pt amb >200ft w/rw and cga for safety. Pt amb with normal gait speed and demonstrated good knee flexion.   Stairs:  Pt ascended/descended 3 stair(s) and 4" curb step with Rolling Walker with bilateral handrails with Contact Guard " Assistance.       (In degrees) AROM PROM   L knee flexion 80 95   L knee extension 0         Treatment & Education:  Pt preformed seated ex 10x in chair. Pt can be impulsive and tends to forget and leave rw behind.     Patient left up in chair with all lines intact and call button in reach..    GOALS:   Multidisciplinary Problems       Physical Therapy Goals          Problem: Physical Therapy    Goal Priority Disciplines Outcome Goal Variances Interventions   Physical Therapy Goal     PT, PT/OT Progressing     Description: Pt will improve functional independence by performing:    Bed mobility: SBA  Sit to stand: SBA with rolling walker  Bed to chair: SBA with Stand Step  with rolling walker   Car Transfer: SBA with rolling walker  Ambulation x 200'  feet with SBA and rolling walker-met  1 Step (Curb): Min A  and rolling walker-met  3 Steps: Min A  and B HR-met  left knee AROM flexion (in degrees): 90  left knee AROM extension (in degrees): 0   Independent with total knee HEP                          Time Tracking:     PT Received On:    PT Start Time: 0907     PT Stop Time: 0934  PT Total Time (min): 27 min     Billable Minutes: Therapeutic Activity 17 and Therapeutic Exercise 10    Treatment Type: Treatment  PT/PTA: PTA     Number of PTA visits since last PT visit: 1 07/16/2024

## 2024-07-16 NOTE — PROGRESS NOTES
"No acute events overnight.  Pain controlled.  Resting in bed.     Vital Signs  Temp: 97.9 °F (36.6 °C)  Temp Source: Oral  Pulse: (!) 56  Heart Rate Source: Monitor  Resp: 16  SpO2: (!) 94 %  Pulse Oximetry Type: Intermittent  Flow (L/min) (Oxygen Therapy): 10  Oxygen Concentration (%): 80  Device (Oxygen Therapy): room air  BP: (!) 162/75  BP Location: Left arm  BP Method: Automatic  Patient Position: Lying  Height and Weight  Height: 5' 10" (177.8 cm)  Height Method: Stated  Weight: 124.9 kg (275 lb 5.7 oz)  Weight Method: Standard Scale  BSA (Calculated - sq m): 2.48 sq meters  BMI (Calculated): 39.5  Weight in (lb) to have BMI = 25: 173.9]    +FHL/EHL  BCR distally  Dressing c/d/i  SILT distally    Recent Lab Results         07/16/24  0503   07/15/24  1100        Immature Platelet Fraction 2.6         Anion Gap 7.0         BUN 19.2         BUN/CREAT RATIO 18         Calcium 8.5         Chloride 110         CO2 25         Creatinine 1.06         eGFR >60         Glucose 178         Hematocrit 36.0   40.8       Hemoglobin 12.3   13.6       MCH 29.9         MCHC 34.2         MCV 87.6         MPV 10.5         nRBC 0.0         Platelet Count 131         Potassium 4.4         RBC 4.11         RDW 13.4         Sodium 142         WBC 13.26                 A/P:  Status post TKA  Pain controlled  Overall patient doing well.  Therapy for mobility and ambulation.  ASA for DVT PPx  Doing well  Home later today vs tomorrow    Progress Note Per Omer Ibrahim MD  Addendum per Hillary Sprague FNP    Co-morbidities mgt:   COPD  -O2 to keep Sats above 93%  -IS q2hrs, strongly encouraged  -OOB as much as possible  -No current issues, continue current management      Sleep Apnea  -CPAP in use during sleep hours  -O2 to keep sats above 93%  -No current issues, continue current management     Hypertension  -BPs as high as 162/72, likely pain related.  -Responding well with administration of morning meds, all home meds restarted  Last BP " 116/65  -Continue to monitor    Hx of Anxiety/Depression  Zoloft restarted    Gout  Allopurinol resumed    Hx of Vertigo  Meclizine resumed    Morbid obesity   BMI 40    BPH  -Flomax resumed  -Pt voiding without any issues      Hx of DVT  -While hospitalized, Lovenox 30mg bid, will decrease to 40mg daily due to excessive wound drainage.  Eliquis 2.5mg BID x 30 days post-op.   -SCDs and PATITO hose  -PATITO hose for 2 weeks post-op, patient instructed and verbalized understanding      Assessment and Plan:  Impaired Mobility  -PT for gait training and Strengthening.  -Patient progressing well in PT. Plan for discharge tomorrow.     Excessive Bleeding at Drain/Incision site  -Apply pressure dressing and continue to monitor for improvement. Will keep patient one more night in the hospital for closer monitoring. Will discharge in AM H&H stable and if drainage improves. Recheck H&H in AM   Duricef 50mg BID x 7 days    Considering the patients excessive wound drainage, multiple co-morbidities and his level of function in PT, will convert to inpatient and keep at least one more night. Continue with close monitoring of fluid intake and output, vital signs and neuro checks.  Plan for discharge within 1-2 days.    DVT Prophylaxis - Lovenox  Bowel Prophylaxis - MIralax/Senokot S/Reglan  (+) Voiding, (+) Flatus, (-) BM  Pain Controlled

## 2024-07-16 NOTE — PLAN OF CARE
Problem: Adult Inpatient Plan of Care  Goal: Plan of Care Review  Outcome: Progressing     Problem: Wound  Goal: Optimal Coping  Outcome: Progressing     Problem: Infection  Goal: Absence of Infection Signs and Symptoms  Outcome: Progressing     Problem: Diabetes Comorbidity  Goal: Blood Glucose Level Within Targeted Range  Outcome: Progressing

## 2024-07-16 NOTE — PT/OT/SLP PROGRESS
"Physical Therapy Treatment    Patient Name:  Camden Uriarte   MRN:  [id number]    Recommendations:     Discharge Recommendations: Low Intensity Therapy  Discharge Equipment Recommendations: walker, rolling  Barriers to discharge: None    Assessment:     Camden Uriarte is a 78 y.o. male admitted with a medical diagnosis of Primary osteoarthritis of left knee.  He presents with the following impairments/functional limitations: weakness, impaired endurance, impaired functional mobility, decreased lower extremity function, pain, decreased ROM, edema, orthopedic precautions .    Rehab Prognosis: Good; patient would benefit from acute skilled PT services to address these deficits and reach maximum level of function.    Recent Surgery: Procedure(s) (LRB):  ROBOTIC ARTHROPLASTY, KNEE, TOTAL (Left) 1 Day Post-Op    Plan:     During this hospitalization, patient to be seen BID to address the identified rehab impairments via gait training, therapeutic activities, therapeutic exercises and progress toward the following goals:    Plan of Care Expires:  07/19/24    Subjective     Chief Complaint: n/a  Patient/Family Comments/goals: n/a  Pain/Comfort:         Objective:     Communicated with rn prior to session.  Patient found supine with   upon PT entry to room.     General Precautions: Standard, fall  Orthopedic Precautions: LLE weight bearing as tolerated  Braces:    Respiratory Status: Room air     Functional Mobility:  Bed Mobility:     Supine to Sit: stand by assistance  Transfers:     Sit to Stand:  supervision with rolling walker  Bed to Chair: supervision with  rolling walker  using  Step Transfer  Gait: pt amb 200ft w/rw and sba for safety.   Stairs:  Pt ascended/descended 3 stair(s) and 4" curb step with Rolling Walker with bilateral handrails with Stand-by Assistance.     Patient left up in chair with all lines intact and call button in reach..    GOALS:   Multidisciplinary Problems       Physical Therapy Goals          " Problem: Physical Therapy    Goal Priority Disciplines Outcome Goal Variances Interventions   Physical Therapy Goal     PT, PT/OT Progressing     Description: Pt will improve functional independence by performing:    Bed mobility: SBA  Sit to stand: SBA with rolling walker  Bed to chair: SBA with Stand Step  with rolling walker   Car Transfer: SBA with rolling walker  Ambulation x 200'  feet with SBA and rolling walker-met  1 Step (Curb): Min A  and rolling walker-met  3 Steps: Min A  and B HR-met  left knee AROM flexion (in degrees): 90  left knee AROM extension (in degrees): 0   Independent with total knee HEP                          Time Tracking:     PT Received On:    PT Start Time: 1427     PT Stop Time: 1442  PT Total Time (min): 15 min     Billable Minutes: Therapeutic Activity 15    Treatment Type: Treatment  PT/PTA: PTA     Number of PTA visits since last PT visit: 1 07/16/2024

## 2024-07-16 NOTE — NURSING
Nurses Note -- 4 Eyes      7/16/2024   3:42 PM      Skin assessed during: Q Shift Change      [x] No Altered Skin Integrity Present    [x]Prevention Measures Documented      [] Yes- Altered Skin Integrity Present or Discovered   [] LDA Added if Not in Epic (Describe Wound)   [] New Altered Skin Integrity was Present on Admit and Documented in LDA   [] Wound Image Taken    Wound Care Consulted? No    Attending Nurse:  Gardenia Rivera RN/Staff Member:   Danya

## 2024-07-16 NOTE — PLAN OF CARE
Problem: Physical Therapy  Goal: Physical Therapy Goal  Description: Pt will improve functional independence by performing:    Bed mobility: SBA  Sit to stand: SBA with rolling walker  Bed to chair: SBA with Stand Step  with rolling walker   Car Transfer: SBA with rolling walker  Ambulation x 200'  feet with SBA and rolling walker-met  1 Step (Curb): Min A  and rolling walker-met  3 Steps: Min A  and B HR-met  left knee AROM flexion (in degrees): 90  left knee AROM extension (in degrees): 0   Independent with total knee HEP     Outcome: Progressing

## 2024-07-17 VITALS
BODY MASS INDEX: 39.42 KG/M2 | SYSTOLIC BLOOD PRESSURE: 123 MMHG | HEIGHT: 70 IN | DIASTOLIC BLOOD PRESSURE: 60 MMHG | OXYGEN SATURATION: 94 % | RESPIRATION RATE: 16 BRPM | HEART RATE: 56 BPM | TEMPERATURE: 98 F | WEIGHT: 275.38 LBS

## 2024-07-17 LAB
ANION GAP SERPL CALC-SCNC: 8 MEQ/L
BUN SERPL-MCNC: 21.3 MG/DL (ref 8.4–25.7)
CALCIUM SERPL-MCNC: 8.5 MG/DL (ref 8.8–10)
CHLORIDE SERPL-SCNC: 109 MMOL/L (ref 98–107)
CO2 SERPL-SCNC: 24 MMOL/L (ref 23–31)
CREAT SERPL-MCNC: 0.94 MG/DL (ref 0.73–1.18)
CREAT/UREA NIT SERPL: 23
ERYTHROCYTE [DISTWIDTH] IN BLOOD BY AUTOMATED COUNT: 14 % (ref 11.5–17)
GFR SERPLBLD CREATININE-BSD FMLA CKD-EPI: >60 ML/MIN/1.73/M2
GLUCOSE SERPL-MCNC: 113 MG/DL (ref 82–115)
HCT VFR BLD AUTO: 33.1 % (ref 42–52)
HGB BLD-MCNC: 11.1 G/DL (ref 14–18)
MCH RBC QN AUTO: 29.6 PG (ref 27–31)
MCHC RBC AUTO-ENTMCNC: 33.5 G/DL (ref 33–36)
MCV RBC AUTO: 88.3 FL (ref 80–94)
NRBC BLD AUTO-RTO: 0 %
PLATELET # BLD AUTO: 125 X10(3)/MCL (ref 130–400)
PMV BLD AUTO: 10.7 FL (ref 7.4–10.4)
POTASSIUM SERPL-SCNC: 3.8 MMOL/L (ref 3.5–5.1)
RBC # BLD AUTO: 3.75 X10(6)/MCL (ref 4.7–6.1)
SODIUM SERPL-SCNC: 141 MMOL/L (ref 136–145)
WBC # BLD AUTO: 8.16 X10(3)/MCL (ref 4.5–11.5)

## 2024-07-17 PROCEDURE — 25000003 PHARM REV CODE 250: Performed by: NURSE PRACTITIONER

## 2024-07-17 PROCEDURE — 85027 COMPLETE CBC AUTOMATED: CPT | Performed by: ORTHOPAEDIC SURGERY

## 2024-07-17 PROCEDURE — 94799 UNLISTED PULMONARY SVC/PX: CPT

## 2024-07-17 PROCEDURE — 97116 GAIT TRAINING THERAPY: CPT | Mod: CQ

## 2024-07-17 PROCEDURE — 94761 N-INVAS EAR/PLS OXIMETRY MLT: CPT

## 2024-07-17 PROCEDURE — 63600175 PHARM REV CODE 636 W HCPCS: Performed by: NURSE PRACTITIONER

## 2024-07-17 PROCEDURE — 80048 BASIC METABOLIC PNL TOTAL CA: CPT | Performed by: ORTHOPAEDIC SURGERY

## 2024-07-17 PROCEDURE — 99900031 HC PATIENT EDUCATION (STAT)

## 2024-07-17 PROCEDURE — 97530 THERAPEUTIC ACTIVITIES: CPT

## 2024-07-17 PROCEDURE — 36415 COLL VENOUS BLD VENIPUNCTURE: CPT | Performed by: ORTHOPAEDIC SURGERY

## 2024-07-17 PROCEDURE — 25000003 PHARM REV CODE 250: Performed by: ORTHOPAEDIC SURGERY

## 2024-07-17 RX ORDER — ADHESIVE BANDAGE
30 BANDAGE TOPICAL ONCE
Status: DISCONTINUED | OUTPATIENT
Start: 2024-07-17 | End: 2024-07-17 | Stop reason: HOSPADM

## 2024-07-17 RX ADMIN — LISINOPRIL 40 MG: 10 TABLET ORAL at 08:07

## 2024-07-17 RX ADMIN — METHOCARBAMOL 750 MG: 750 TABLET ORAL at 09:07

## 2024-07-17 RX ADMIN — CARVEDILOL 12.5 MG: 6.25 TABLET, FILM COATED ORAL at 08:07

## 2024-07-17 RX ADMIN — ACETAMINOPHEN 500 MG: 500 TABLET ORAL at 09:07

## 2024-07-17 RX ADMIN — ACETAMINOPHEN 500 MG: 500 TABLET ORAL at 02:07

## 2024-07-17 RX ADMIN — DOCUSATE SODIUM 200 MG: 100 CAPSULE, LIQUID FILLED ORAL at 08:07

## 2024-07-17 RX ADMIN — HYDROCODONE BITARTRATE AND ACETAMINOPHEN 1 TABLET: 5; 325 TABLET ORAL at 10:07

## 2024-07-17 RX ADMIN — FAMOTIDINE 20 MG: 20 TABLET, FILM COATED ORAL at 08:07

## 2024-07-17 RX ADMIN — ENOXAPARIN SODIUM 40 MG: 40 INJECTION SUBCUTANEOUS at 08:07

## 2024-07-17 RX ADMIN — METOCLOPRAMIDE 10 MG: 10 TABLET ORAL at 01:07

## 2024-07-17 RX ADMIN — TRAMADOL HYDROCHLORIDE 50 MG: 50 TABLET, COATED ORAL at 02:07

## 2024-07-17 RX ADMIN — TRAMADOL HYDROCHLORIDE 50 MG: 50 TABLET, COATED ORAL at 08:07

## 2024-07-17 RX ADMIN — SENNOSIDES AND DOCUSATE SODIUM 2 TABLET: 50; 8.6 TABLET ORAL at 08:07

## 2024-07-17 RX ADMIN — SERTRALINE HYDROCHLORIDE 100 MG: 50 TABLET ORAL at 08:07

## 2024-07-17 RX ADMIN — CEFADROXIL 500 MG: 500 CAPSULE ORAL at 08:07

## 2024-07-17 RX ADMIN — ACETAMINOPHEN 500 MG: 500 TABLET ORAL at 05:07

## 2024-07-17 NOTE — PT/OT/SLP PROGRESS
Physical Therapy Treatment    Patient Name:  Camden Uriarte   MRN:  [id number]    Recommendations:     Discharge Recommendations: Low Intensity Therapy  Discharge Equipment Recommendations: walker, rolling  Barriers to discharge: None    Assessment:     Camden Uriarte is a 78 y.o. male admitted with a medical diagnosis of Primary osteoarthritis of left knee.  He presents with the following impairments/functional limitations: weakness, impaired endurance, impaired functional mobility, decreased lower extremity function, pain, decreased ROM, edema, orthopedic precautions .    Rehab Prognosis: Good; patient would benefit from acute skilled PT services to address these deficits and reach maximum level of function.    Recent Surgery: Procedure(s) (LRB):  ROBOTIC ARTHROPLASTY, KNEE, TOTAL (Left) 2 Days Post-Op    Plan:     During this hospitalization, patient to be seen BID to address the identified rehab impairments via gait training, therapeutic activities, therapeutic exercises and progress toward the following goals:    Plan of Care Expires:  07/19/24    Subjective     Chief Complaint: pain  Patient/Family Comments/goals: n/a  Pain/Comfort:         Objective:     Communicated with rn prior to session.  Patient found up in chair with   upon PT entry to room.     General Precautions: Standard, fall  Orthopedic Precautions: LLE weight bearing as tolerated  Braces:    Respiratory Status: Room air     Functional Mobility:  Bed Mobility:     Supine to Sit: stand by assistance  Transfers:     Sit to Stand:  supervision with rolling walker  Bed to Chair: supervision with  rolling walker  using  Step Transfer  Gait: pt amb 300ft w/rw and cga for safety.       (In degrees) AROM PROM   L knee flexion 85    L knee extension 0         Treatment & Education:  Pt preformed seated 10x.     Patient left up in chair with all lines intact and call button in reach..    GOALS:   Multidisciplinary Problems       Physical Therapy Goals           Problem: Physical Therapy    Goal Priority Disciplines Outcome Goal Variances Interventions   Physical Therapy Goal     PT, PT/OT Progressing     Description: Pt will improve functional independence by performing:    Bed mobility: SBA  Sit to stand: SBA with rolling walker  Bed to chair: SBA with Stand Step  with rolling walker   Car Transfer: SBA with rolling walker  Ambulation x 200'  feet with SBA and rolling walker-met  1 Step (Curb): Min A  and rolling walker-met  3 Steps: Min A  and B HR-met  left knee AROM flexion (in degrees): 90  left knee AROM extension (in degrees): 0   Independent with total knee HEP                          Time Tracking:     PT Received On:    PT Start Time: 1403     PT Stop Time: 1416  PT Total Time (min): 13 min     Billable Minutes: Gait Training 13    Treatment Type: Treatment  PT/PTA: PTA     Number of PTA visits since last PT visit: 1 07/17/2024

## 2024-07-17 NOTE — NURSING
Nurses Note -- 4 Eyes      7/16/2024   10:00 PM      Skin assessed during: Q Shift Change      [x] No Altered Skin Integrity Present    []Prevention Measures Documented      [] Yes- Altered Skin Integrity Present or Discovered   [] LDA Added if Not in Epic (Describe Wound)   [] New Altered Skin Integrity was Present on Admit and Documented in LDA   [] Wound Image Taken    Wound Care Consulted? No    Attending Nurse:  Enrique Rivera RN/Staff Member:   delia

## 2024-07-17 NOTE — PLAN OF CARE
Problem: Adult Inpatient Plan of Care  Goal: Plan of Care Review  Outcome: Progressing  Goal: Patient-Specific Goal (Individualized)  Outcome: Progressing  Goal: Absence of Hospital-Acquired Illness or Injury  Outcome: Progressing  Goal: Optimal Comfort and Wellbeing  Outcome: Progressing  Goal: Readiness for Transition of Care  Outcome: Progressing     Problem: Wound  Goal: Optimal Coping  Outcome: Progressing  Goal: Optimal Functional Ability  Outcome: Progressing  Goal: Absence of Infection Signs and Symptoms  Outcome: Progressing  Goal: Improved Oral Intake  Outcome: Progressing  Goal: Optimal Pain Control and Function  Outcome: Progressing  Goal: Skin Health and Integrity  Outcome: Progressing  Goal: Optimal Wound Healing  Outcome: Progressing     Problem: Infection  Goal: Absence of Infection Signs and Symptoms  Outcome: Progressing     Problem: Comorbidity Management  Goal: Maintenance of COPD Symptom Control  Outcome: Progressing  Goal: Blood Pressure in Desired Range  Outcome: Progressing     Problem: Diabetes Comorbidity  Goal: Blood Glucose Level Within Targeted Range  Outcome: Progressing

## 2024-07-17 NOTE — NURSING
Patient AAOx4, nad. IV dc'd, catheter intact and gauze drsg applied. Medical education given to patient and wife. Verbalized understanding. Patient is being dc'd home with outpatient therapy via wife's personal vehicle.

## 2024-07-17 NOTE — PROGRESS NOTES
"No acute events overnight.  Pain controlled.  Resting in bed. Feeling well this AM.    Vital Signs  Temp: 98.7 °F (37.1 °C)  Temp Source: Oral  Pulse: 62  Heart Rate Source: Monitor  Resp: 16  SpO2: (!) 93 %  Pulse Oximetry Type: Intermittent  Flow (L/min) (Oxygen Therapy): 10  Oxygen Concentration (%): 80  Device (Oxygen Therapy): room air  BP: (!) 140/70  BP Location: Left arm  BP Method: Automatic  Patient Position: Lying  Height and Weight  Height: 5' 10" (177.8 cm)  Height Method: Stated  Weight: 124.9 kg (275 lb 5.7 oz)  Weight Method: Standard Scale  BSA (Calculated - sq m): 2.48 sq meters  BMI (Calculated): 39.5  Weight in (lb) to have BMI = 25: 173.9]    +FHL/EHL  BCR distally  Dressing c/d/i  SILT distally    Recent Lab Results         07/17/24  0414        Anion Gap 8.0       BUN 21.3       BUN/CREAT RATIO 23       Calcium 8.5       Chloride 109       CO2 24       Creatinine 0.94       eGFR >60       Glucose 113       Hematocrit 33.1       Hemoglobin 11.1       MCH 29.6       MCHC 33.5       MCV 88.3       MPV 10.7       nRBC 0.0       Platelet Count 125       Potassium 3.8       RBC 3.75       RDW 14.0       Sodium 141       WBC 8.16               A/P:  Status post TKA  Pain controlled  Overall patient doing well.  Therapy for mobility and ambulation.  LMWH for DVT PPx    "

## 2024-07-17 NOTE — NURSING
Nurses Note -- 4 Eyes      7/17/2024   3:11 PM      Skin assessed during: Q Shift Change      [x] No Altered Skin Integrity Present    [x]Prevention Measures Documented      [] Yes- Altered Skin Integrity Present or Discovered   [] LDA Added if Not in Epic (Describe Wound)   [] New Altered Skin Integrity was Present on Admit and Documented in LDA   [] Wound Image Taken    Wound Care Consulted? No    Attending Nurse:  Gardenia Rivera RN/Staff Member:   Danya

## 2024-07-17 NOTE — PT/OT/SLP PROGRESS
Physical Therapy Treatment    Patient Name:  Camden Uriarte   MRN:  [id number]    Recommendations:     Discharge Recommendations: Low Intensity Therapy  Discharge Equipment Recommendations: walker, rolling  Barriers to discharge:  pain management    Assessment:     Camden Uriarte is a 78 y.o. male admitted with a medical diagnosis of Primary osteoarthritis of left knee.  He presents with the following impairments/functional limitations: weakness, impaired endurance, impaired functional mobility, decreased lower extremity function, pain, decreased ROM, edema, orthopedic precautions .    Rehab Prognosis: Fair; patient would benefit from acute skilled PT services to address these deficits and reach maximum level of function.    Recent Surgery: Procedure(s) (LRB):  ROBOTIC ARTHROPLASTY, KNEE, TOTAL (Left) 2 Days Post-Op    Plan:     During this hospitalization, patient to be seen BID to address the identified rehab impairments via gait training, therapeutic activities, therapeutic exercises and progress toward the following goals:    Plan of Care Expires:  07/19/24    Subjective     Chief Complaint: L knee pain  Patient/Family Comments/goals:   Pain/Comfort:  Location - Side 1: Left  Location 1: knee  Pain Addressed 1: Pre-medicate for activity, Reposition, Distraction, Cessation of Activity      Objective:     Communicated with nurse prior to session.  Patient found up in chair with peripheral IV, telemetry upon PT entry to room.     General Precautions: Standard, fall  Orthopedic Precautions: LLE weight bearing as tolerated  Braces:    Respiratory Status: Room air     Functional Mobility:  Transfers:     Sit to Stand:  contact guard assistance with rolling walker  Gait: Pt ambulated 4 steps forward w rw and CGA, unable to continue due to 10/10 pain            Treatment & Education:  Pt edu on total knee protocol and importance of frequent mobility    Patient left up in chair with all lines intact, call button in reach,  and nurse notified..    GOALS:   Multidisciplinary Problems       Physical Therapy Goals          Problem: Physical Therapy    Goal Priority Disciplines Outcome Goal Variances Interventions   Physical Therapy Goal     PT, PT/OT Progressing     Description: Pt will improve functional independence by performing:    Bed mobility: SBA  Sit to stand: SBA with rolling walker  Bed to chair: SBA with Stand Step  with rolling walker   Car Transfer: SBA with rolling walker  Ambulation x 200'  feet with SBA and rolling walker-met  1 Step (Curb): Min A  and rolling walker-met  3 Steps: Min A  and B HR-met  left knee AROM flexion (in degrees): 90  left knee AROM extension (in degrees): 0   Independent with total knee HEP                          Time Tracking:     PT Received On:    PT Start Time: 0947     PT Stop Time: 0955  PT Total Time (min): 8 min     Billable Minutes: Therapeutic Activity 8    Treatment Type: Treatment  PT/PTA: PT     Number of PTA visits since last PT visit: 0     07/17/2024

## 2024-07-18 ENCOUNTER — PATIENT OUTREACH (OUTPATIENT)
Dept: ADMINISTRATIVE | Facility: CLINIC | Age: 79
End: 2024-07-18
Payer: MEDICARE

## 2024-07-18 LAB — VIEW PATHOLOGY REPORT (RELIAPATH): NORMAL

## 2024-07-18 NOTE — PROGRESS NOTES
C3 nurse attempted to contact Camden Mustafary  for a TCC post hospital discharge follow up call. No answer. Left voicemail with callback information. The patient has a scheduled HOSFU appointment with Omer Ibrahim MD (Orthopedic Surgery) on Tuesday 7/30/24 @ 10:30am w/ Danya (Dr Ibrahim's NP)

## 2024-07-19 NOTE — PROGRESS NOTES
C3 nurse made second attempt to contact Camden Uriarte for a TCC post hospital discharge follow up call.

## 2024-07-22 NOTE — PROGRESS NOTES
C3 nurse made third attempt to contact Camden Uriarte for a TCC post hospital discharge follow up call.

## 2024-07-23 NOTE — DISCHARGE SUMMARY
[facility]  Discharge Summary      Admit Date: 7/15/2024    Discharge Date and Time: 7/17/2024  3:40 PM    Attending Physician: Domi att. providers found     Reason for Admission: primary osteoarthritis left knee    Procedures Performed: Procedure(s) (LRB):  ROBOTIC ARTHROPLASTY, KNEE, TOTAL (Left)    Hospital Course Patient seen in clinic with complaints of left knee pain. Tried and failed all conservative measures including activity modification, anti-inflammatories, and injections. Patient felt as though they have reached a point of disability with regards to left knee pain. Risk, benefits, and alternatives discussed for left total knee arthroplasty. Despite these risks, the patient wished to proceed with surgical intervention.    Patient admitted as an inpatient. Seen preoperatively by anesthesia and cleared for surgery. Patient was then taken to the OR and a left total knee arthroplasty was performed. For full details please see dictated operative note. Patient tolerated the procedure without any issues and was transferred to the floor postoperatively. Physical therapy began working with the patient on postop day 1 and patient was made weightbearing as tolerated to affected extremity. Patient progressed well with physical therapy and eventually cleared all physical therapy guidelines. Patient's pain and vitals remained stable throughout hospitalization. Wound was checked and found to be clean, dry, and intact. At this point the patient was deemed suitable to discharge home.      Goals of Care Treatment Preferences:  Code Status: Full Code      Consults: PT    Significant Diagnostic Studies:   Specimen (24h ago, onward)      None            Final Diagnoses:    Principal Problem: Primary osteoarthritis of left knee   Secondary Diagnoses:   Active Hospital Problems    Diagnosis  POA    *Primary osteoarthritis of left knee [M17.12]  Yes      Resolved Hospital Problems   No resolved  problems to display.       Discharged Condition: good    Disposition: Home or Self Care    Follow Up/Patient Instructions:     Medications:  Reconciled Home Medications:      Medication List        START taking these medications      apixaban 2.5 mg Tab  Commonly known as: ELIQUIS  Take 1 tablet (2.5 mg total) by mouth 2 (two) times daily. Blood clot prevention     methocarbamoL 750 MG Tab  Commonly known as: ROBAXIN  Take 1 tablet (750 mg total) by mouth every 6 (six) hours as needed (muscle spasms).            CONTINUE taking these medications      allopurinoL 300 MG tablet  Commonly known as: ZYLOPRIM  Take 300 mg by mouth once daily.     atorvastatin 40 MG tablet  Commonly known as: LIPITOR  Take 40 mg by mouth every evening.     benazepriL 40 MG tablet  Commonly known as: LOTENSIN  Take 1 tablet by mouth every morning.     carvediloL 12.5 MG tablet  Commonly known as: COREG  Take 12.5 mg by mouth 2 (two) times daily with meals.     meclizine 25 mg tablet  Commonly known as: ANTIVERT  Take 25 mg by mouth as needed.     mecobalam-folate-B6-B2-betain 1,000 mcg-3,400 mcg DFE-10 mg Cap  Take 1 tablet by mouth Daily.     NON FORMULARY MEDICATION  Take 1 tablet by mouth Daily.     omega-3 acid ethyl esters 1 gram capsule  Commonly known as: LOVAZA  Take 2 g by mouth once daily. Ran out     sertraline 100 MG tablet  Commonly known as: ZOLOFT  Take 100 mg by mouth once daily. 1 tab in am then half tab at hs     tamsulosin 0.4 mg Cap  Commonly known as: FLOMAX  Take 0.4 mg by mouth every evening.     VITAMIN B-12 100 MCG tablet  Generic drug: cyanocobalamin  Take 100 mcg by mouth once daily.     vitamin E 100 UNIT capsule  Take 100 Units by mouth once daily.            STOP taking these medications      aspirin 81 MG EC tablet  Commonly known as: ECOTRIN     traMADoL 50 mg tablet  Commonly known as: ULTRAM            ASK your doctor about these medications      cefadroxil 500 MG Cap  Commonly known as: DURICEF  Take 1  capsule (500 mg total) by mouth every 12 (twelve) hours. for 7 days  Ask about: Should I take this medication?     traMADoL 50 mg tablet  Commonly known as: ULTRAM  Take 1 tablet (50 mg total) by mouth every 4 (four) hours as needed for Pain.  Ask about: Should I take this medication?            No discharge procedures on file.   Follow-up Information       Omer Ibrahim MD. Go on 7/30/2024.    Specialty: Orthopedic Surgery  Why: Ortho follow up appt on Tuesday 7/30/24 @ 10:30am w/ Danya (Dr Ibrahim's NP)  Contact information:  [address].  [address]  [facility] [place] [facility] [address]  [phone]               [facility] - Follow up.    Specialty: Physical Therapy  Why: This is the outpatient therapy facility. They will contact pt with appt date & time. Call if you have questions or concerns.  Contact information:  [facility] [facility]  [place] [address]  [phone]

## 2024-07-23 NOTE — PROGRESS NOTES
I have seen the patient, reviewed the Nurse Practitioner's discharge summary. I have personally interviewed and examined the patient at bedside and: agree with the findings.     Omer Ibrahim MD  Orthopedic Surgery

## 2024-07-30 ENCOUNTER — OFFICE VISIT (OUTPATIENT)
Dept: ORTHOPEDICS | Facility: CLINIC | Age: 79
End: 2024-07-30
Payer: MEDICARE

## 2024-07-30 ENCOUNTER — HOSPITAL ENCOUNTER (OUTPATIENT)
Dept: RADIOLOGY | Facility: CLINIC | Age: 79
Discharge: HOME | End: 2024-07-30
Attending: NURSE PRACTITIONER
Payer: MEDICARE

## 2024-07-30 VITALS
SYSTOLIC BLOOD PRESSURE: 144 MMHG | WEIGHT: 275.38 LBS | HEART RATE: 62 BPM | DIASTOLIC BLOOD PRESSURE: 83 MMHG | HEIGHT: 70 IN | BODY MASS INDEX: 39.42 KG/M2

## 2024-07-30 DIAGNOSIS — M17.12 PRIMARY OSTEOARTHRITIS OF LEFT KNEE: Primary | ICD-10-CM

## 2024-07-30 DIAGNOSIS — M17.12 PRIMARY OSTEOARTHRITIS OF LEFT KNEE: ICD-10-CM

## 2024-07-30 PROCEDURE — 73562 X-RAY EXAM OF KNEE 3: CPT | Mod: LT,,, | Performed by: NURSE PRACTITIONER

## 2024-07-30 PROCEDURE — 99024 POSTOP FOLLOW-UP VISIT: CPT | Mod: POP,,, | Performed by: NURSE PRACTITIONER

## 2024-07-30 NOTE — PROGRESS NOTES
Chief Complaint:   Chief Complaint   Patient presents with    Post-op Evaluation     2wk sp left TKA sx 7/15/24-10/13/24. Xr today. Doing well overall. He states some pain here and there mainly when he sits too long and with prolonged ambulation. Using walker today to ambulate.       History of present illness: Camden Uriarte is a 78 y.o. male, presents to clinic today in regards to his left knee.  Patient is 2 weeks out from surgery.  Overall he is doing excellent.  Ambulating with the assistance of a walker.  Currently in physical therapy for strengthening, range of motion, mobility.  Doing very well with this.  States pain is not increased.  Does have some swelling.  Does state he had very minimal drainage in the distal aspect of his incision site.  He is very very scant amount of drainage noted on the dressing today here in clinic.      Past Medical History:   Diagnosis Date    Anemia, unspecified     Arthritis     Cancer     Lips    COPD (chronic obstructive pulmonary disease)     Dr. ESDRAS Jules lung MD said no issue    Elevated cholesterol     Gout     Hypertension     Renal disorder     kidney stones    Sleep apnea     cpap    Thrombosis 2022    Lungs, from right knee surgery       Past Surgical History:   Procedure Laterality Date    APPENDECTOMY      CATARACT EXTRACTION W/  INTRAOCULAR LENS IMPLANT Bilateral     COLONOSCOPY      CYSTOSCOPY W/ LASER LITHOTRIPSY  07/02/2024    DENTAL SURGERY      gastric sleeve  2016    ROBOTIC ARTHROPLASTY, KNEE Right 10/10/2022    Omer Ibrahim MD; pt had PE after surgery    ROBOTIC ARTHROPLASTY, KNEE Left 7/15/2024    Procedure: ROBOTIC ARTHROPLASTY, KNEE, TOTAL;  Surgeon: Omer Ibrahim MD;  Location: [facility];  Service: Orthopedics;  Laterality: Left;    TOTAL SHOULDER ARTHROPLASTY Right 2018    Dr. Ceja       Current Outpatient Medications   Medication Sig    allopurinoL (ZYLOPRIM) 300 MG tablet Take 300 mg by mouth once daily.    apixaban (ELIQUIS) 2.5 mg  Tab Take 1 tablet (2.5 mg total) by mouth 2 (two) times daily. Blood clot prevention    atorvastatin (LIPITOR) 40 MG tablet Take 40 mg by mouth every evening.    benazepriL (LOTENSIN) 40 MG tablet Take 1 tablet by mouth every morning.    carvediloL (COREG) 12.5 MG tablet Take 12.5 mg by mouth 2 (two) times daily with meals.    cyanocobalamin (VITAMIN B-12) 100 MCG tablet Take 100 mcg by mouth once daily.    meclizine (ANTIVERT) 25 mg tablet Take 25 mg by mouth as needed.    mecobalam-folate-B6-B2-betain 1,000 mcg-3,400 mcg DFE-10 mg Cap Take 1 tablet by mouth Daily.    NON FORMULARY MEDICATION Take 1 tablet by mouth Daily.    omega-3 acid ethyl esters (LOVAZA) 1 gram capsule Take 2 g by mouth once daily. Ran out    sertraline (ZOLOFT) 100 MG tablet Take 100 mg by mouth once daily. 1 tab in am then half tab at hs    tamsulosin (FLOMAX) 0.4 mg Cap Take 0.4 mg by mouth every evening.    vitamin E 100 UNIT capsule Take 100 Units by mouth once daily.     No current facility-administered medications for this visit.     Facility-Administered Medications Ordered in Other Visits   Medication    betamethasone acetate-betamethasone sodium phosphate injection 12 mg    betamethasone acetate-betamethasone sodium phosphate injection 12 mg    hyaluronate (SYNVISC) 16 mg/2 mL injection 16 mg    LIDOcaine (PF) 20 mg/mL (2%) injection 5 mL    LIDOcaine (PF) 20 mg/mL (2%) injection 5 mL    LIDOcaine HCL 20 mg/ml (2%) injection 2 mL       Review of patient's allergies indicates:  No Known Allergies    Family History   Problem Relation Name Age of Onset    Cancer Mother      Diabetes Maternal Grandmother         Social History     Socioeconomic History    Marital status:    Tobacco Use    Smoking status: Never    Smokeless tobacco: Never   Substance and Sexual Activity    Alcohol use: Yes     Alcohol/week: 7.0 standard drinks of alcohol     Types: 7 Cans of beer per week    Drug use: Never    Sexual activity: Yes     Partners:  Female           Review of Systems:    Denies fevers, chills, chest pain, shortness of breath. Comprehensive review of systems performed and otherwise negative except as noted in HPI      Physical Examination:    General: awake and alert, no acute distress, healthy appearing  Head and Neck: Head atraumatic/normocephalic. Moist MM  CV: brisk cap refill  Lungs: non-labored breathing, w/o cough or SOB  Skin: no rashes present, warm to touch  Neuro: sensation grossly intact distall     Vital Signs:    Vitals:    07/30/24 1033   BP: (!) 144/83   Pulse: 62       Body mass index is 39.51 kg/m².    Examination left knee:    Incision clean dry and intact. No erythema or drainage or signs of infection.  Sensation intact distally to left foot  Positive FHL/EHL/gastrocsoleus/tib ant  Brisk capillary refill to left foot  No swelling or signs of DVT  Range of motion: extension at 10 and flexion at 95  Stable to varus valgus  Stable to anterior and posterior drawer    X-rays: 3 views of the left knee reviewed. Patient's implants appear well fixed. No signs of loosening or subsidence noted.     Assessment::post op s/p L TKA    Plan:  Presents to clinic today in regards to left knee.  He is 2 weeks out from surgery.  His knee is stable on exam x-rays today here in clinic.  He is to continue physical therapy for strengthening, range of motion, mobility.  Incision site is well healed and staples were discontinued.  Steri-Strips were applied towards the distal aspect of the incision.  Did have some very minor drainage with bending of the knee.  He is to keep these Steri-Strips on until they fall off.  We will have him follow up in the clinic in 4 weeks if unless he needs to be seen sooner.  At this time point we will get an x-ray.  Patient states understanding agrees with plan of care.    This note was created using Tugg voice recognition software that occasionally misinterpreted phrases or words.    Consult note is delivered via  Epic messaging service.

## 2024-08-27 ENCOUNTER — OFFICE VISIT (OUTPATIENT)
Dept: ORTHOPEDICS | Facility: CLINIC | Age: 79
End: 2024-08-27
Payer: MEDICARE

## 2024-08-27 ENCOUNTER — HOSPITAL ENCOUNTER (OUTPATIENT)
Dept: RADIOLOGY | Facility: CLINIC | Age: 79
Discharge: HOME | End: 2024-08-27
Attending: ORTHOPAEDIC SURGERY
Payer: MEDICARE

## 2024-08-27 VITALS
DIASTOLIC BLOOD PRESSURE: 83 MMHG | WEIGHT: 278 LBS | SYSTOLIC BLOOD PRESSURE: 157 MMHG | HEIGHT: 70 IN | HEART RATE: 61 BPM | BODY MASS INDEX: 39.8 KG/M2

## 2024-08-27 DIAGNOSIS — Z96.651 STATUS POST RIGHT KNEE REPLACEMENT: ICD-10-CM

## 2024-08-27 DIAGNOSIS — Z96.652 AFTERCARE FOLLOWING LEFT KNEE JOINT REPLACEMENT SURGERY: Primary | ICD-10-CM

## 2024-08-27 DIAGNOSIS — Z47.1 AFTERCARE FOLLOWING LEFT KNEE JOINT REPLACEMENT SURGERY: Primary | ICD-10-CM

## 2024-08-27 PROCEDURE — 73564 X-RAY EXAM KNEE 4 OR MORE: CPT | Mod: 50,,, | Performed by: ORTHOPAEDIC SURGERY

## 2024-08-27 PROCEDURE — 99024 POSTOP FOLLOW-UP VISIT: CPT | Mod: POP,,, | Performed by: ORTHOPAEDIC SURGERY

## 2024-08-27 NOTE — PROGRESS NOTES
Chief Complaint:   Chief Complaint   Patient presents with    Left Knee - Follow-up     Pt states he has been experiencing a buckling sensation in both knees. Pt states he is having difficulty to walk w/o assistance. Pain is located in the outer side of his patella on the Rt knee and on the back of the Lt knee. Pt is taking Tylenol X-strength for pain and Tramadol at times.       History of present illness:    History of Present Illness  The patient presents for evaluation of bilateral knee pain.    He reports that his left knee tends to buckle more than the right, particularly when walking without support at home. This morning, while exercising with a bar, he noticed a similar buckling sensation in his right knee. He mentions that his left knee is still gaining strength but feels tight again. Additionally, he notes that his foot deviates to the left when he walks. If he remains seated for extended periods, he finds it difficult to stand up. However, he acknowledges that the brace provides some relief.    His left knee surgery was performed in 2018, and the right knee surgery was carried out on 07/15/2024. He is satisfied with the progress of his left knee since the surgery.    He maintains an active lifestyle, walking across the street from his house every evening. Occasionally, he uses a cane for support, especially when going to the mailbox.    Past Medical History:   Diagnosis Date    Anemia, unspecified     Arthritis     Cancer     Lips    COPD (chronic obstructive pulmonary disease)     Dr. ESDRAS Jules lung MD said no issue    Elevated cholesterol     Gout     Hypertension     Renal disorder     kidney stones    Sleep apnea     cpap    Thrombosis 2022    Lungs, from right knee surgery       Past Surgical History:   Procedure Laterality Date    APPENDECTOMY      CATARACT EXTRACTION W/  INTRAOCULAR LENS IMPLANT Bilateral     COLONOSCOPY      CYSTOSCOPY W/ LASER LITHOTRIPSY  07/02/2024    DENTAL SURGERY       gastric sleeve  2016    ROBOTIC ARTHROPLASTY, KNEE Right 10/10/2022    Omer Ibrahim MD; pt had PE after surgery    ROBOTIC ARTHROPLASTY, KNEE Left 7/15/2024    Procedure: ROBOTIC ARTHROPLASTY, KNEE, TOTAL;  Surgeon: Omer Ibrahmi MD;  Location: [facility];  Service: Orthopedics;  Laterality: Left;    TOTAL SHOULDER ARTHROPLASTY Right 2018    Dr. Ceja       Current Outpatient Medications   Medication Sig    allopurinoL (ZYLOPRIM) 300 MG tablet Take 300 mg by mouth once daily.    atorvastatin (LIPITOR) 40 MG tablet Take 40 mg by mouth every evening.    benazepriL (LOTENSIN) 40 MG tablet Take 1 tablet by mouth every morning.    carvediloL (COREG) 12.5 MG tablet Take 12.5 mg by mouth 2 (two) times daily with meals.    cyanocobalamin (VITAMIN B-12) 100 MCG tablet Take 100 mcg by mouth once daily.    meclizine (ANTIVERT) 25 mg tablet Take 25 mg by mouth as needed.    mecobalam-folate-B6-B2-betain 1,000 mcg-3,400 mcg DFE-10 mg Cap Take 1 tablet by mouth Daily.    NON FORMULARY MEDICATION Take 1 tablet by mouth Daily.    omega-3 acid ethyl esters (LOVAZA) 1 gram capsule Take 2 g by mouth once daily. Ran out    sertraline (ZOLOFT) 100 MG tablet Take 100 mg by mouth once daily. 1 tab in am then half tab at hs    tamsulosin (FLOMAX) 0.4 mg Cap Take 0.4 mg by mouth every evening.    vitamin E 100 UNIT capsule Take 100 Units by mouth once daily.     No current facility-administered medications for this visit.     Facility-Administered Medications Ordered in Other Visits   Medication    betamethasone acetate-betamethasone sodium phosphate injection 12 mg    betamethasone acetate-betamethasone sodium phosphate injection 12 mg    hyaluronate (SYNVISC) 16 mg/2 mL injection 16 mg    LIDOcaine (PF) 20 mg/mL (2%) injection 5 mL    LIDOcaine (PF) 20 mg/mL (2%) injection 5 mL    LIDOcaine HCL 20 mg/ml (2%) injection 2 mL       Review of patient's allergies indicates:  No Known Allergies    Family History   Problem Relation Name  Age of Onset    Cancer Mother      Diabetes Maternal Grandmother         Social History     Socioeconomic History    Marital status:    Tobacco Use    Smoking status: Never    Smokeless tobacco: Never   Substance and Sexual Activity    Alcohol use: Yes     Alcohol/week: 7.0 standard drinks of alcohol     Types: 7 Cans of beer per week    Drug use: Never    Sexual activity: Yes     Partners: Female           Review of Systems:    Constitution: Negative for chills, fever, and sweats.  Negative for unexplained weight loss.    HENT:  Negative for headaches and blurry vision.    Cardiovascular:Negative for chest pain or irregular heart beat. Negative for hypertension.    Respiratory:  Negative for cough and shortness of breath.    Gastrointestinal: Negative for abdominal pain, heartburn, melena, nausea, and vomitting.    Genitourinary:  Negative bladder incontinence and dysuria.    Musculoskeletal:  See HPI    Neurological: Negative for numbness.    Psychiatric/Behavioral: Negative for depression.  The patient is not nervous/anxious.      Endocrine: Negative for polyuria    Hematologic/Lymphatic: Negative for bleeding problem.  Does not bruise/bleed easily.    Skin: Negative for poor would healing and rash      Physical Examination:    Vital Signs:    Vitals:    08/27/24 1532   BP: (!) 157/83   Pulse: 61       Body mass index is 39.89 kg/m².    General: No acute distress, alert and oriented, healthy appearing    HEENT: Head is atraumatic, mucous membranes are moist    Neck: Supples, no JVD    Cardiovascular: Palpable dorsalis pedis and posterior tibial pulses, regular rate and rhythm to those pulses    Lungs: Breathing non-labored    Skin: no rashes appreciated    Neurologic: Can flex and extend knees, ankles, and toes. Sensation is grossly intact    Left knee:  Patient was good stability left knee.  It gets full extension.  Flexion greater than 110°.  It was stable to varus and valgus.  It was no significant  instability palpable.    X-rays:  Three views of the left knee reviewed.  Patient was well-fixed components no signs of loosening or subsidence noted.     Assessment::  Status post left total knee arthroplasty    Plan:  With the patient was doing quite well.  It was pain is well-controlled.  It was happy with his recovery.  It was having some buckling likely due to weakness.  Continue physical therapy.  Continue working on strengthening.  We will see him back in a couple of months no x-rays knee returns.    This note was generated with the assistance of ambient listening technology. Verbal consent was obtained by the patient and accompanying visitor(s) for the recording of patient appointment to facilitate this note. I attest to having reviewed and edited the generated note for accuracy, though some syntax or spelling errors may persist. Please contact the author of this note for any clarification.      This note was created using [employer] voice recognition software that occasionally misinterpreted phrases or words.    Consult note is delivered via Epic messaging service.

## 2024-10-28 ENCOUNTER — TELEPHONE (OUTPATIENT)
Dept: ORTHOPEDICS | Facility: CLINIC | Age: 79
End: 2024-10-28
Payer: MEDICARE

## 2024-11-15 ENCOUNTER — OFFICE VISIT (OUTPATIENT)
Dept: ORTHOPEDICS | Facility: CLINIC | Age: 79
End: 2024-11-15
Payer: MEDICARE

## 2024-11-15 VITALS
BODY MASS INDEX: 39.22 KG/M2 | HEIGHT: 70 IN | HEART RATE: 65 BPM | SYSTOLIC BLOOD PRESSURE: 125 MMHG | DIASTOLIC BLOOD PRESSURE: 66 MMHG | WEIGHT: 274 LBS

## 2024-11-15 DIAGNOSIS — Z47.1 AFTERCARE FOLLOWING LEFT KNEE JOINT REPLACEMENT SURGERY: Primary | ICD-10-CM

## 2024-11-15 DIAGNOSIS — Z96.652 AFTERCARE FOLLOWING LEFT KNEE JOINT REPLACEMENT SURGERY: Primary | ICD-10-CM

## 2024-11-15 NOTE — PROGRESS NOTES
Chief Complaint:   Chief Complaint   Patient presents with    Left Knee - Follow-up     Lt TKA 07/15/2024. Patient states on the outside of his left knee it feels like pins and needles. Patient went to PT 2x a week. And finished Pt at the end of October.        History of present illness:    This is a 79 y.o. year old male who complains of postop follow up left total knee arthroplasty pain   Patient states he was doing well overall he has been making good progress with the physical therapy has range of motion is excellent.    The only complaint he has a some lateral-sided pain at times he states feels like pins and needles but otherwise he is doing fine      Current Outpatient Medications   Medication Sig    allopurinoL (ZYLOPRIM) 300 MG tablet Take 300 mg by mouth once daily.    atorvastatin (LIPITOR) 40 MG tablet Take 40 mg by mouth every evening.    benazepriL (LOTENSIN) 40 MG tablet Take 1 tablet by mouth every morning.    carvediloL (COREG) 12.5 MG tablet Take 12.5 mg by mouth 2 (two) times daily with meals.    cyanocobalamin (VITAMIN B-12) 100 MCG tablet Take 100 mcg by mouth once daily.    meclizine (ANTIVERT) 25 mg tablet Take 25 mg by mouth as needed.    mecobalam-folate-B6-B2-betain 1,000 mcg-3,400 mcg DFE-10 mg Cap Take 1 tablet by mouth Daily.    NON FORMULARY MEDICATION Take 1 tablet by mouth Daily.    omega-3 acid ethyl esters (LOVAZA) 1 gram capsule Take 2 g by mouth once daily. Ran out    sertraline (ZOLOFT) 100 MG tablet Take 100 mg by mouth once daily. 1 tab in am then half tab at hs    tamsulosin (FLOMAX) 0.4 mg Cap Take 0.4 mg by mouth every evening.    vitamin E 100 UNIT capsule Take 100 Units by mouth once daily.     No current facility-administered medications for this visit.     Facility-Administered Medications Ordered in Other Visits   Medication    betamethasone acetate-betamethasone sodium phosphate injection 12 mg    betamethasone acetate-betamethasone sodium phosphate injection 12 mg     "hyaluronate (SYNVISC) 16 mg/2 mL injection 16 mg    LIDOcaine (PF) 20 mg/mL (2%) injection 5 mL    LIDOcaine (PF) 20 mg/mL (2%) injection 5 mL    LIDOcaine HCL 20 mg/ml (2%) injection 2 mL       Review of Systems:    Constitution:   Denies chills, fever, and sweats.  HENT:   Denies headaches or blurry vision.  Cardiovascular:  Denies chest pain or irregular heart beat.  Respiratory:   Denies cough or shortness of breath.  Gastrointestinal:  Denies abdominal pain, nausea, or vomiting.  Musculoskeletal:   Denies muscle cramps.  Neurological:   Denies dizziness or focal weakness.  Psychiatric/Behavior: Normal mental status.  Hematology/Lymph:  Denies bleeding problem or easy bruising/bleeding.  Skin:    Denies rash or suspicious lesions.    Examination:    Vital Signs:    Vitals:    11/15/24 0847   BP: 125/66   Pulse: 65   Weight: 124.3 kg (274 lb)   Height: 5' 10" (1.778 m)       Body mass index is 39.31 kg/m².    Constitution:   Well-developed, well nourished patient in no acute distress.  Neurological:   Alert and oriented x 3 and cooperative to examination.     Psychiatric/Behavior: Normal mental status.  Respiratory:   No shortness of breath.  Eyes:    Extraoccular muscles intact  Skin:    No scars, rash or suspicious lesions.    Physical Exam:   Left knee exam.    Patient was well-healed surgical scar pain   Range of motion from 0-115.    He has no tenderness of the mediolateral patellofemoral joint line.    He does have some mild dysesthesias in the lateral aspect of his knee which he was normal from the postoperative course.    No instability to an AP or varus or valgus stressing.    No erythema and no effusion        Assessment: Aftercare following left knee joint replacement surgery         Plan:  At this point the patient was knee is doing quite well   He was finished his formal physical therapy in his think his pain in his symptoms that he was experiencing subside with just returned to normal daily " activity.    He will follow up with Dr. Simon centeno at the six-month [facility] for his regular protocol some          DISCLAIMER: This note may have been dictated using voice recognition software and may contain grammatical errors.     NOTE: Consult report sent to referring provider via [facility] EMR.

## 2024-12-16 DIAGNOSIS — R26.89 OTHER ABNORMALITIES OF GAIT AND MOBILITY: Primary | ICD-10-CM

## 2025-01-16 ENCOUNTER — HOSPITAL ENCOUNTER (OUTPATIENT)
Dept: RADIOLOGY | Facility: CLINIC | Age: 80
Discharge: HOME | End: 2025-01-16
Attending: ORTHOPAEDIC SURGERY
Payer: MEDICARE

## 2025-01-16 ENCOUNTER — OFFICE VISIT (OUTPATIENT)
Dept: ORTHOPEDICS | Facility: CLINIC | Age: 80
End: 2025-01-16
Payer: MEDICARE

## 2025-01-16 VITALS
HEART RATE: 66 BPM | WEIGHT: 274.06 LBS | BODY MASS INDEX: 39.23 KG/M2 | DIASTOLIC BLOOD PRESSURE: 97 MMHG | HEIGHT: 70 IN | SYSTOLIC BLOOD PRESSURE: 201 MMHG

## 2025-01-16 DIAGNOSIS — Z96.652 AFTERCARE FOLLOWING LEFT KNEE JOINT REPLACEMENT SURGERY: Primary | ICD-10-CM

## 2025-01-16 DIAGNOSIS — Z47.1 AFTERCARE FOLLOWING LEFT KNEE JOINT REPLACEMENT SURGERY: ICD-10-CM

## 2025-01-16 DIAGNOSIS — Z47.1 AFTERCARE FOLLOWING LEFT KNEE JOINT REPLACEMENT SURGERY: Primary | ICD-10-CM

## 2025-01-16 DIAGNOSIS — Z96.652 AFTERCARE FOLLOWING LEFT KNEE JOINT REPLACEMENT SURGERY: ICD-10-CM

## 2025-01-16 PROCEDURE — 73564 X-RAY EXAM KNEE 4 OR MORE: CPT | Mod: LT,,, | Performed by: ORTHOPAEDIC SURGERY

## 2025-01-16 PROCEDURE — 99214 OFFICE O/P EST MOD 30 MIN: CPT | Mod: ,,, | Performed by: ORTHOPAEDIC SURGERY

## 2025-01-16 NOTE — PROGRESS NOTES
Chief Complaint:   Chief Complaint   Patient presents with    Follow-up     F/u L TKA Sx 7/15/24, OOGL. Pt has c/o left knee pain, rated 6/10. C/o numbness and throbbing through his thigh and left knee. Pt states his toes are numbness and throbbing as well. Also has c/o of light headedness and dizziness. Patient fell onto his knees around sarah time when he was helping his wife put up [email or web address] decorations. Pt see's dr berry and just had inj in his back in December with very little relief. He is unsure if this is causing the symptoms with his knee. Will repeat xrs today.        History of present illness:    History of Present Illness  The patient is a 80-year-old male who presents for evaluation of left knee pain. He is accompanied by his wife.    He reports persistent discomfort in his left knee, which was surgically intervened in early March 2024. He experienced a fall on the same knee just before Sarah while attempting to move a chair. The pain has slightly improved since then. He describes a throbbing sensation when the knee is extended and also reports numbness in his toes. He does not experience any pain in the gluteal region. He received injections in December 2024, but they did not provide relief. He has an upcoming appointment with his ENT physician tomorrow due to severe headaches and dizziness. He also has a scheduled visit with his neurologist on Tuesday. He has been experiencing persistent pain in his neck and shoulders. He plans to use a TENS unit for his shoulder pain, as it has previously been effective for his back pain. He has a referral for physical therapy and intends to schedule an appointment next week.    Past Medical History:   Diagnosis Date    Anemia, unspecified     Arthritis     Cancer     Lips    COPD (chronic obstructive pulmonary disease)     Dr. ESDRAS Jules lung MD said no issue    Elevated cholesterol     Gout     Hypertension     Renal disorder     kidney stones     Sleep apnea     cpap    Thrombosis 2022    Lungs, from right knee surgery       Past Surgical History:   Procedure Laterality Date    APPENDECTOMY      CATARACT EXTRACTION W/  INTRAOCULAR LENS IMPLANT Bilateral     COLONOSCOPY      CYSTOSCOPY W/ LASER LITHOTRIPSY  07/02/2024    DENTAL SURGERY      gastric sleeve  2016    ROBOTIC ARTHROPLASTY, KNEE Right 10/10/2022    Omer Ibrahim MD; pt had PE after surgery    ROBOTIC ARTHROPLASTY, KNEE Left 7/15/2024    Procedure: ROBOTIC ARTHROPLASTY, KNEE, TOTAL;  Surgeon: Omer Ibrahim MD;  Location: [facility];  Service: Orthopedics;  Laterality: Left;    TOTAL SHOULDER ARTHROPLASTY Right 2018    Dr. Ceja       Current Outpatient Medications   Medication Sig    allopurinoL (ZYLOPRIM) 300 MG tablet Take 300 mg by mouth once daily.    atorvastatin (LIPITOR) 40 MG tablet Take 40 mg by mouth every evening.    benazepriL (LOTENSIN) 40 MG tablet Take 1 tablet by mouth every morning.    carvediloL (COREG) 12.5 MG tablet Take 12.5 mg by mouth 2 (two) times daily with meals.    cyanocobalamin (VITAMIN B-12) 100 MCG tablet Take 100 mcg by mouth once daily.    meclizine (ANTIVERT) 25 mg tablet Take 25 mg by mouth as needed.    mecobalam-folate-B6-B2-betain 1,000 mcg-3,400 mcg DFE-10 mg Cap Take 1 tablet by mouth Daily.    NON FORMULARY MEDICATION Take 1 tablet by mouth Daily.    omega-3 acid ethyl esters (LOVAZA) 1 gram capsule Take 2 g by mouth once daily. Ran out    sertraline (ZOLOFT) 100 MG tablet Take 100 mg by mouth once daily. 1 tab in am then half tab at hs    tamsulosin (FLOMAX) 0.4 mg Cap Take 0.4 mg by mouth every evening.    vitamin E 100 UNIT capsule Take 100 Units by mouth once daily.     No current facility-administered medications for this visit.     Facility-Administered Medications Ordered in Other Visits   Medication    betamethasone acetate-betamethasone sodium phosphate injection 12 mg    betamethasone acetate-betamethasone sodium phosphate injection 12 mg     hyaluronate (SYNVISC) 16 mg/2 mL injection 16 mg    LIDOcaine (PF) 20 mg/mL (2%) injection 5 mL    LIDOcaine (PF) 20 mg/mL (2%) injection 5 mL    LIDOcaine HCL 20 mg/ml (2%) injection 2 mL       Review of patient's allergies indicates:  No Known Allergies    Family History   Problem Relation Name Age of Onset    Cancer Mother      Diabetes Maternal Grandmother         Social History     Socioeconomic History    Marital status:    Tobacco Use    Smoking status: Never    Smokeless tobacco: Never   Substance and Sexual Activity    Alcohol use: Yes     Alcohol/week: 7.0 standard drinks of alcohol     Types: 7 Cans of beer per week    Drug use: Never    Sexual activity: Yes     Partners: Female           Review of Systems:    Constitution: Negative for chills, fever, and sweats.  Negative for unexplained weight loss.    HENT:  Negative for headaches and blurry vision.    Cardiovascular:Negative for chest pain or irregular heart beat. Negative for hypertension.    Respiratory:  Negative for cough and shortness of breath.    Gastrointestinal: Negative for abdominal pain, heartburn, melena, nausea, and vomitting.    Genitourinary:  Negative bladder incontinence and dysuria.    Musculoskeletal:  See HPI    Neurological: Negative for numbness.    Psychiatric/Behavioral: Negative for depression.  The patient is not nervous/anxious.      Endocrine: Negative for polyuria    Hematologic/Lymphatic: Negative for bleeding problem.  Does not bruise/bleed easily.    Skin: Negative for poor would healing and rash      Physical Examination:    Vital Signs:    Vitals:    01/16/25 1305   BP: (!) 201/97   Pulse: 66       Body mass index is 39.32 kg/m².    General: No acute distress, alert and oriented, healthy appearing    HEENT: Head is atraumatic, mucous membranes are moist    Neck: Supples, no JVD    Cardiovascular: Palpable dorsalis pedis and posterior tibial pulses, regular rate and rhythm to those pulses    Lungs: Breathing  non-labored    Skin: no rashes appreciated    Neurologic: Can flex and extend knees, ankles, and toes. Sensation is grossly intact    Left knee:  Patient's left knee can get to full extension.  It had excellent flexion greater than 120°.  Stable to varus and valgus.  Stable anterior-posterior drawer.  No significant symptoms of instability noted.    X-rays:  Three views of the left knee reviewed.  Patient's implants appear well fixed with no signs of loosening or subsidence noted of the left knee.     Assessment::  Status post left total knee arthroplasty    Plan:  The patient with the following of the left knee recently.  Flared this up.  Otherwise his implants are stable.  He is doing well.  He is about 7 or 8 months out from left knee.  He will contact us with any issues in the future.  Otherwise we will see him back in a an as-needed basis.    This note was generated with the assistance of ambient listening technology. Verbal consent was obtained by the patient and accompanying visitor(s) for the recording of patient appointment to facilitate this note. I attest to having reviewed and edited the generated note for accuracy, though some syntax or spelling errors may persist. Please contact the author of this note for any clarification.      This note was created using [employer] voice recognition software that occasionally misinterpreted phrases or words.    Consult note is delivered via Epic messaging service.

## 2025-01-31 DIAGNOSIS — R42 DISEQUILIBRIUM: ICD-10-CM

## 2025-01-31 DIAGNOSIS — R42 VERTIGO: Primary | ICD-10-CM

## 2025-02-18 ENCOUNTER — OFFICE VISIT (OUTPATIENT)
Dept: NEUROLOGY | Facility: CLINIC | Age: 80
End: 2025-02-18
Payer: MEDICARE

## 2025-02-18 VITALS
DIASTOLIC BLOOD PRESSURE: 72 MMHG | HEIGHT: 70 IN | SYSTOLIC BLOOD PRESSURE: 133 MMHG | WEIGHT: 270 LBS | BODY MASS INDEX: 38.65 KG/M2

## 2025-02-18 DIAGNOSIS — R26.89 OTHER ABNORMALITIES OF GAIT AND MOBILITY: ICD-10-CM

## 2025-02-18 DIAGNOSIS — E66.01 MORBID (SEVERE) OBESITY DUE TO EXCESS CALORIES: ICD-10-CM

## 2025-02-18 DIAGNOSIS — M48.062 SPINAL STENOSIS OF LUMBAR REGION WITH NEUROGENIC CLAUDICATION: ICD-10-CM

## 2025-02-18 DIAGNOSIS — R29.6 MULTIPLE FALLS: Primary | ICD-10-CM

## 2025-02-18 PROCEDURE — 99214 OFFICE O/P EST MOD 30 MIN: CPT | Mod: PBBFAC | Performed by: SPECIALIST

## 2025-02-18 NOTE — PROGRESS NOTES
"Subjective:      @Patient ID: Camden Uriarte is a 79 y.o. male.    Chief Complaint/referral reason/HPI:   Chief Complaint   Patient presents with    Gait Problem     Difficulties with gait 4 falls last year assistance with cane dizziness daily right frontal headaches comprehension is off dragging left foot tremors sleeps well with CPAP        Addkathia coley comments:    See also 'facesheet' under media for handwritten notes     Review of Systems         []   []   []   [] Working     [] Retired, worked as:   [] Drives       [] Does not drive     -------------------------------------    Anemia, unspecified    Arthritis    Cancer    Lips    COPD (chronic obstructive pulmonary disease)    Dr. ESDRAS Jules lung MD said no issue    Elevated cholesterol    Gout    Hypertension    Renal disorder    kidney stones    Sleep apnea    cpap    Thrombosis    Lungs, from right knee surgery     ..  Current Outpatient Medications   Medication Instructions    allopurinoL (ZYLOPRIM) 300 mg, Daily    atorvastatin (LIPITOR) 40 mg, Nightly    benazepriL (LOTENSIN) 40 MG tablet 1 tablet, Every morning    carvediloL (COREG) 12.5 mg, 2 times daily with meals    cyanocobalamin (VITAMIN B-12) 100 mcg, Daily    meclizine (ANTIVERT) 25 mg, As needed (PRN)    mecobalam-folate-B6-B2-betain 1,000 mcg-3,400 mcg DFE-10 mg Cap 1 tablet, Daily    NON FORMULARY MEDICATION 1 tablet, Daily    omega-3 acid ethyl esters (LOVAZA) 2 g, Daily    sertraline (ZOLOFT) 100 mg, Daily    tamsulosin (FLOMAX) 0.4 mg, Nightly    vitamin E 100 Units, Daily      Objective:      Exam:   Visit Vitals  /72 (BP Location: Right arm, Patient Position: Sitting)   Ht 5' 10" (1.778 m)   Wt 122.5 kg (270 lb)   BMI 38.74 kg/m²     General Exam  If Accompanied, by__       body habitus_ Body mass index is 38.74 kg/m².    Neurological:  Exam comments:  CN's: ok   Speech: ok   Motor: no focal deficits   incl no foot drops   Coord: no tremor seen today "   Reflexes: has AJ's but intermittently   Plantars: flat but ticklish   Sensation: can feel vib in toes   Gait: cane           Neuroimaging:  [x] Images and imaging reports reviewed. Rads summary:  My comments: hi grade canal stenosis L3-4 / envision 2.2025    Labs:    New Patient; Complexity of Data:[x] High  [] Moderate  Risks:[x] High   [] Moderate  MDM:[x] High   [] Moderate         Assessment/Plan:         ICD-10-CM ICD-9-CM   1. Multiple falls  R29.6 V15.88   2. Spinal stenosis of lumbar region with neurogenic claudication  M48.062 724.03   3. Other abnormalities of gait and mobility  R26.89 781.2   4. Morbid (severe) obesity due to excess calories  E66.01 278.01   Tremor described sounds like essential tremor     he is not parkinsonian in my view today       Other Comments / Follow Up:      I aim to review brain imaging done in [place] in recent months   He could benefit from L spine decompression surgery in my view /         MD TAMMY NatarajanA [facility], [facility]  Neuroscience Center Medical Director; [facility]

## 2025-03-03 ENCOUNTER — TELEPHONE (OUTPATIENT)
Dept: NEUROLOGY | Facility: CLINIC | Age: 80
End: 2025-03-03
Payer: MEDICARE

## 2025-04-03 ENCOUNTER — OFFICE VISIT (OUTPATIENT)
Dept: NEUROLOGY | Facility: CLINIC | Age: 80
End: 2025-04-03
Payer: MEDICARE

## 2025-04-03 VITALS
DIASTOLIC BLOOD PRESSURE: 86 MMHG | SYSTOLIC BLOOD PRESSURE: 132 MMHG | HEIGHT: 70 IN | BODY MASS INDEX: 38.65 KG/M2 | WEIGHT: 270 LBS

## 2025-04-03 DIAGNOSIS — R29.6 MULTIPLE FALLS: ICD-10-CM

## 2025-04-03 DIAGNOSIS — M54.16 LUMBAR RADICULOPATHY: Primary | ICD-10-CM

## 2025-04-03 DIAGNOSIS — M48.062 SPINAL STENOSIS OF LUMBAR REGION WITH NEUROGENIC CLAUDICATION: Primary | ICD-10-CM

## 2025-04-03 PROCEDURE — 99999 PR PBB SHADOW E&M-EST. PATIENT-LVL III: CPT | Mod: PBBFAC,,, | Performed by: SPECIALIST

## 2025-04-03 PROCEDURE — 99215 OFFICE O/P EST HI 40 MIN: CPT | Mod: S$PBB,,, | Performed by: SPECIALIST

## 2025-04-03 PROCEDURE — 99213 OFFICE O/P EST LOW 20 MIN: CPT | Mod: PBBFAC | Performed by: SPECIALIST

## 2025-04-03 RX ORDER — AMLODIPINE AND BENAZEPRIL HYDROCHLORIDE 10; 20 MG/1; MG/1
1 CAPSULE ORAL DAILY
COMMUNITY

## 2025-04-03 RX ORDER — PERPHENAZINE 2 MG
TABLET ORAL
COMMUNITY

## 2025-04-03 NOTE — PROGRESS NOTES
"Subjective:         Patient ID: Camden Uriarte is a 79 y.o. male.    Chief Complaint/HPI:   Chief Complaint   Patient presents with    F/U Back Pain-Multiple Falls.      Herniated Disc lower back. States he will be walking and he just falls. Doing PT 2x/wk w little help. Lt knee os stiff. Saw Dr Acevedo last week and he thinks it may be a damaged nerve from when he had knee surgery last year. C/O dizziness lasting all day all the time, Ha over rt eye qod but it is getting better. Pts wife is  here today.          Addn HPI:          Ongoing falls   LOS request EMG BLE's     Co R sciatica   ...  notes may also be on facesheet for HPI, ROS, and other sections   ROS:             Current Outpatient Medications   Medication Instructions    allopurinoL (ZYLOPRIM) 300 mg, Daily    amlodipine-benazepril 10-20mg (LOTREL) 10-20 mg per capsule 1 capsule, Daily    atorvastatin (LIPITOR) 40 mg, Nightly    benazepriL (LOTENSIN) 40 MG tablet 1 tablet, Every morning    carvediloL (COREG) 12.5 mg, 2 times daily with meals    cyanocobalamin (VITAMIN B-12) 100 mcg, Daily    fish,bora,flax oils-om3,6,9no1 (OMEGA 3-6-9) 1,200 mg Cap     meclizine (ANTIVERT) 25 mg, As needed (PRN)    mecobalam-folate-B6-B2-betain 1,000 mcg-3,400 mcg DFE-10 mg Cap 1 tablet, Daily    NON FORMULARY MEDICATION 1 tablet, Daily    omega-3 acid ethyl esters (LOVAZA) 2 g, Daily    sertraline (ZOLOFT) 100 mg, Daily    tamsulosin (FLOMAX) 0.4 mg, Nightly    vitamin E 100 Units, Daily      Objective:      Exam  /86 (BP Location: Left arm, Patient Position: Sitting)   Ht 5' 10" (1.778 m)   Wt 122.5 kg (270 lb)   BMI 38.74 kg/m²   General:   If Accompanied, by__ w  heart:   pharynx:  Neurological   Speech: Clear   vis fields:    EOMs:    funduscopic:   Motor:  No ankle DF weakness but less upper excursion on the L   coord:     Gait:  Cane     Neuroimaging:  Images brain MRI [place] reviewed. My comments: age assoc 's   Prior comments about env L spine " mri canal stenosis 3-4     Labs:    Complexity of Data:     [x] High  [] Moderate   Risks:   [x] High   [] Moderate   MDM:      [x] High   [] Moderate         Assessment/Plan:         ICD-10-CM ICD-9-CM   1. Spinal stenosis of lumbar region with neurogenic claudication  M48.062 724.03   2. Multiple falls  R29.6 V15.88     Other comments/ follow up:        No orders of the defined types were placed in this encounter.       I can do emg on him in weeks ahead     Might benefit from back surgery in future       Bebeto Guerrero MD [facility]

## 2025-04-29 ENCOUNTER — PROCEDURE VISIT (OUTPATIENT)
Dept: NEUROLOGY | Facility: CLINIC | Age: 80
End: 2025-04-29
Payer: MEDICARE

## 2025-04-29 DIAGNOSIS — M48.062 SPINAL STENOSIS OF LUMBAR REGION WITH NEUROGENIC CLAUDICATION: Primary | ICD-10-CM

## 2025-04-29 DIAGNOSIS — M54.16 LUMBAR RADICULOPATHY: ICD-10-CM

## 2025-04-29 PROCEDURE — 95885 MUSC TST DONE W/NERV TST LIM: CPT | Mod: PBBFAC | Performed by: SPECIALIST

## 2025-04-29 PROCEDURE — 95886 MUSC TEST DONE W/N TEST COMP: CPT | Mod: PBBFAC | Performed by: SPECIALIST

## 2025-04-29 PROCEDURE — 95909 NRV CNDJ TST 5-6 STUDIES: CPT | Mod: PBBFAC | Performed by: SPECIALIST

## 2025-04-29 NOTE — PROCEDURES
Nerve conductions / EMG performed and full report scanned in chart. (Media tab)   summary comments, if any:   ..        Bebeto Guerrero MD

## (undated) DEVICE — GLOVE PROTEXIS HYDROGEL SZ8.5

## (undated) DEVICE — SUT VICRYL BR 1 GEN 27 CT-1

## (undated) DEVICE — KIT DRAPE RIO ONE PIECE W/POCK

## (undated) DEVICE — GLOVE SENSICARE PI GRN 7

## (undated) DEVICE — CUFF ATS 2 PORT SNGL BLDR 34IN

## (undated) DEVICE — KIT VIZADISC KNEE TRACKING

## (undated) DEVICE — PADDING CAST SYN STRL 6INX4YD

## (undated) DEVICE — DRAPE FULL SHEET 70X100IN

## (undated) DEVICE — WRAP DEMAYO LEG STERILE

## (undated) DEVICE — DEVICE STRATAFIX SYMMETRIC +

## (undated) DEVICE — GLOVE SENSICARE PI MICRO 6.5

## (undated) DEVICE — SOL NORMAL USPCA 0.9%

## (undated) DEVICE — COVER TABLE HVY DTY 60X90IN

## (undated) DEVICE — SUT MONO 2-0 CT-1 VIL

## (undated) DEVICE — PIN BONE 3.2X110MM
Type: IMPLANTABLE DEVICE | Site: KNEE | Status: NON-FUNCTIONAL
Removed: 2024-07-15

## (undated) DEVICE — STAPLER SKIN PROXIMATE WIDE

## (undated) DEVICE — BLADE SAG DUAL CUT 18X90X1.35

## (undated) DEVICE — CORD SILICONE RETRACTOR

## (undated) DEVICE — PAD ABD 8X10 STERILE

## (undated) DEVICE — ELECTRODE PATIENT RETURN DISP

## (undated) DEVICE — DRAPE STERI U-SHAPED 47X51IN

## (undated) DEVICE — DRESSING XEROFORM NONADH 1X8IN

## (undated) DEVICE — DRESSING XEROFORM 1X8IN

## (undated) DEVICE — SOL NACL IRR 1000ML BTL

## (undated) DEVICE — SPONGE COTTON TRAY 4X4IN

## (undated) DEVICE — KIT SURGICAL TURNOVER

## (undated) DEVICE — DRAPE MEDIUM SHEET 40X70IN

## (undated) DEVICE — KIT CHECKPOINT MAKO

## (undated) DEVICE — Device

## (undated) DEVICE — GLOVE 7.0 PROTEXIS PI BLUE

## (undated) DEVICE — GAUZE SPONGE 4'X4 12 PLY

## (undated) DEVICE — APPLICATOR CHLORAPREP ORN 26ML

## (undated) DEVICE — GOWN POLY REINF X-LONG 2XL

## (undated) DEVICE — BLADE MAKO STANDARD

## (undated) DEVICE — CUSHION  WC FOAM 20X20X.75IN

## (undated) DEVICE — KIT TRIATHLON CR TIB PREP SZ7

## (undated) DEVICE — TAPE SILK 3IN

## (undated) DEVICE — SUT MONOCRYL 3-0 PS-2 UND

## (undated) DEVICE — GLOVE PROTEXIS BLUE LATEX 8.5

## (undated) DEVICE — SOL POVIDONE IODINE PCH 3/4OZ

## (undated) DEVICE — PIN BONE 3.2X110MM
Type: IMPLANTABLE DEVICE | Site: KNEE | Status: NON-FUNCTIONAL
Removed: 2022-10-10

## (undated) DEVICE — KIT TRIATHLON CR FEM PREP SZ6

## (undated) DEVICE — GLOVE SIGNATURE ESSNTL LTX 8.5

## (undated) DEVICE — PIN FIXATION BONE 140X3.2MM
Type: IMPLANTABLE DEVICE | Site: KNEE | Status: NON-FUNCTIONAL
Removed: 2024-07-15

## (undated) DEVICE — GLOVE PROTEXIS HYDROGEL SZ6.5

## (undated) DEVICE — KIT TRIATHLON CR FEM PREP SZ7

## (undated) DEVICE — GLOVE SENSICARE PI GRN 8.5

## (undated) DEVICE — BANDAGE ACE DOUBLE STER 6IN

## (undated) DEVICE — PAD ABDOMINAL STERILE 8X10IN

## (undated) DEVICE — GAUZE SPONGE 4X4 12PLY

## (undated) DEVICE — GLOVE SENSICARE PI ORTHO LT 8

## (undated) DEVICE — PADDING WYTEX UNDRCST 6INX4YD

## (undated) DEVICE — DRESSING XEROFORM FOIL PK 1X8

## (undated) DEVICE — SYR 10CC LUER LOCK